# Patient Record
Sex: FEMALE | Race: WHITE | NOT HISPANIC OR LATINO | Employment: FULL TIME | URBAN - METROPOLITAN AREA
[De-identification: names, ages, dates, MRNs, and addresses within clinical notes are randomized per-mention and may not be internally consistent; named-entity substitution may affect disease eponyms.]

---

## 2017-07-27 ENCOUNTER — OFFICE VISIT (OUTPATIENT)
Dept: FAMILY MEDICINE | Facility: OTHER | Age: 47
End: 2017-07-27
Payer: COMMERCIAL

## 2017-07-27 VITALS
SYSTOLIC BLOOD PRESSURE: 108 MMHG | HEIGHT: 69 IN | RESPIRATION RATE: 12 BRPM | DIASTOLIC BLOOD PRESSURE: 64 MMHG | TEMPERATURE: 98.8 F | BODY MASS INDEX: 35.7 KG/M2 | HEART RATE: 74 BPM | WEIGHT: 241 LBS

## 2017-07-27 DIAGNOSIS — L72.0 MILIAL CYST: ICD-10-CM

## 2017-07-27 DIAGNOSIS — D22.9 SUSPICIOUS NEVUS: Primary | ICD-10-CM

## 2017-07-27 PROCEDURE — 88305 TISSUE EXAM BY PATHOLOGIST: CPT | Performed by: PHYSICIAN ASSISTANT

## 2017-07-27 PROCEDURE — 11100 HC BIOPSY SKIN/SUBQ/MUC MEM, SINGLE LESION: CPT | Performed by: PHYSICIAN ASSISTANT

## 2017-07-27 PROCEDURE — 99213 OFFICE O/P EST LOW 20 MIN: CPT | Mod: 25 | Performed by: PHYSICIAN ASSISTANT

## 2017-07-27 ASSESSMENT — ANXIETY QUESTIONNAIRES
IF YOU CHECKED OFF ANY PROBLEMS ON THIS QUESTIONNAIRE, HOW DIFFICULT HAVE THESE PROBLEMS MADE IT FOR YOU TO DO YOUR WORK, TAKE CARE OF THINGS AT HOME, OR GET ALONG WITH OTHER PEOPLE: NOT DIFFICULT AT ALL
2. NOT BEING ABLE TO STOP OR CONTROL WORRYING: NOT AT ALL
6. BECOMING EASILY ANNOYED OR IRRITABLE: NOT AT ALL
7. FEELING AFRAID AS IF SOMETHING AWFUL MIGHT HAPPEN: NOT AT ALL
1. FEELING NERVOUS, ANXIOUS, OR ON EDGE: NOT AT ALL
5. BEING SO RESTLESS THAT IT IS HARD TO SIT STILL: NOT AT ALL
GAD7 TOTAL SCORE: 0
3. WORRYING TOO MUCH ABOUT DIFFERENT THINGS: NOT AT ALL

## 2017-07-27 ASSESSMENT — PATIENT HEALTH QUESTIONNAIRE - PHQ9: 5. POOR APPETITE OR OVEREATING: NOT AT ALL

## 2017-07-27 ASSESSMENT — PAIN SCALES - GENERAL: PAINLEVEL: NO PAIN (0)

## 2017-07-27 NOTE — LETTER
My Depression Action Plan  Name: Triny Bower   Date of Birth 1970  Date: 7/27/2017    My doctor: Otis Samaniego   My clinic: [address] [address] [phone]          GREEN    ZONE   Good Control    What it looks like:     Things are going generally well. You have normal up s and down s. You may even feel depressed from time to time, but bad moods usually last less than a day.   What you need to do:  1. Continue to care for yourself (see self care plan)  2. Check your depression survival kit and update it as needed  3. Follow your physician s recommendations including any medication.  4. Do not stop taking medication unless you consult with your physician first.           YELLOW         ZONE Getting Worse    What it looks like:     Depression is starting to interfere with your life.     It may be hard to get out of bed; you may be starting to isolate yourself from others.    Symptoms of depression are starting to last most all day and this has happened for several days.     You may have suicidal thoughts but they are not constant.   What you need to do:     1. Call your care team, your response to treatment will improve if you keep your care team informed of your progress. Yellow periods are signs an adjustment may need to be made.     2. Continue your self-care, even if you have to fake it!    3. Talk to someone in your support network    4. Open up your depression survival kit           RED    ZONE Medical Alert - Get Help    What it looks like:     Depression is seriously interfering with your life.     You may experience these or other symptoms: You can t get out of bed most days, can t work or engage in other necessary activities, you have trouble taking care of basic hygiene, or basic responsibilities, thoughts of suicide or death that will not go away, self-injurious behavior.     What you need to do:  1. Call your care team and  request a same-day appointment. If they are not available (weekends or after hours) call your local crisis line, emergency room or 911.      Electronically signed by: Franchesca Schaefer, July 27, 2017    Depression Self Care Plan / Survival Kit    Self-Care for Depression  Here s the deal. Your body and mind are really not as separate as most people think.  What you do and think affects how you feel and how you feel influences what you do and think. This means if you do things that people who feel good do, it will help you feel better.  Sometimes this is all it takes.  There is also a place for medication and therapy depending on how severe your depression is, so be sure to consult with your medical provider and/ or Behavioral Health Consultant if your symptoms are worsening or not improving.     In order to better manage my stress, I will:    Exercise  Get some form of exercise, every day. This will help reduce pain and release endorphins, the  feel good  chemicals in your brain. This is almost as good as taking antidepressants!  This is not the same as joining a gym and then never going! (they count on that by the way ) It can be as simple as just going for a walk or doing some gardening, anything that will get you moving.      Hygiene   Maintain good hygiene (Get out of bed in the morning, Make your bed, Brush your teeth, Take a shower, and Get dressed like you were going to work, even if you are unemployed).  If your clothes don't fit try to get ones that do.    Diet  I will strive to eat foods that are good for me, drink plenty of water, and avoid excessive sugar, caffeine, alcohol, and other mood-altering substances.  Some foods that are helpful in depression are: complex carbohydrates, B vitamins, flaxseed, fish or fish oil, fresh fruits and vegetables.    Psychotherapy  I agree to participate in Individual Therapy (if recommended).    Medication  If prescribed medications, I agree to take them.  Missing doses  can result in serious side effects.  I understand that drinking alcohol, or other illicit drug use, may cause potential side effects.  I will not stop my medication abruptly without first discussing it with my provider.    Staying Connected With Others  I will stay in touch with my friends, family members, and my primary care provider/team.    Use your imagination  Be creative.  We all have a creative side; it doesn t matter if it s oil painting, sand castles, or mud pies! This will also kick up the endorphins.    Witness Beauty  (AKA stop and smell the roses) Take a look outside, even in mid-winter. Notice colors, textures. Watch the squirrels and birds.     Service to others  Be of service to others.  There is always someone else in need.  By helping others we can  get out of ourselves  and remember the really important things.  This also provides opportunities for practicing all the other parts of the program.    Humor  Laugh and be silly!  Adjust your TV habits for less news and crime-drama and more comedy.    Control your stress  Try breathing deep, massage therapy, biofeedback, and meditation. Find time to relax each day.     My support system    Clinic Contact:  Phone number:    Contact 1:  Phone number:    Contact 2:  Phone number:    Yazdan[employer]/:  Phone number:    Therapist:  Phone number:    Local crisis center:    Phone number:    Other community support:  Phone number:

## 2017-07-27 NOTE — NURSING NOTE
"Chief Complaint   Patient presents with     Derm Problem       Initial /64  Pulse 74  Temp 98.8  F (37.1  C) (Temporal)  Resp 12  Ht 5' 8.75\" (1.746 m)  Wt 241 lb (109.3 kg)  BMI 35.85 kg/m2 Estimated body mass index is 35.85 kg/(m^2) as calculated from the following:    Height as of this encounter: 5' 8.75\" (1.746 m).    Weight as of this encounter: 241 lb (109.3 kg).  Medication Reconciliation: complete     Franchesca Schaefer CMA (AAMA)      "

## 2017-07-27 NOTE — PROGRESS NOTES
SUBJECTIVE:                                                    Triny Bower is a 47 year old female who presents to clinic today for the following health issues:      Rash  Onset: about a month for one on face, over a year for one on leg    Description:   Location: right cheek and left side of forehead, left lower leg  Character: raised, white on face, red on leg  Itching (Pruritis): no     Progression of Symptoms:  same and constant    Accompanying Signs & Symptoms:  Fever: no   Body aches or joint pain: no   Sore throat symptoms: no   Recent cold symptoms: no     History:   none    Precipitating factors:   none    Alleviating factors:  none    Therapies Tried and outcome: tried squeezing it, lotion, washing face    Patient is here with concern about a few skin lesions. She has 2 small white bumps on the face that have not changed in size and shape. She has not had any drainage from the lesions, has not had any other skin changes to the face.     In addition she has a mole on the leg that has been present for several years that she states has stayed about the same size but has gotten some spots in the center and often gets cut when she shaves, she would like this lesion removed for concern that it may be cancerous.   -------------------------------------    Problem list and histories reviewed & adjusted, as indicated.  Additional history: as documented    BP Readings from Last 3 Encounters:   07/27/17 108/64   11/06/16 (!) 135/93   05/25/16 114/60    Wt Readings from Last 3 Encounters:   07/27/17 241 lb (109.3 kg)   11/06/16 220 lb (99.8 kg)   05/25/16 238 lb (108 kg)         Reviewed and updated as needed this visit by clinical staffTobacco  Allergies  Meds  Problems  Med Hx  Surg Hx  Fam Hx  Soc Hx        Reviewed and updated as needed this visit by Provider  Tobacco  Allergies  Meds  Problems  Med Hx  Surg Hx  Fam Hx  Soc Hx          ROS:  Constitutional, HEENT, cardiovascular, pulmonary, gi  "and gu systems are negative, except as otherwise noted.      OBJECTIVE:   /64  Pulse 74  Temp 98.8  F (37.1  C) (Temporal)  Resp 12  Ht 5' 8.75\" (1.746 m)  Wt 241 lb (109.3 kg)  BMI 35.85 kg/m2  Body mass index is 35.85 kg/(m^2).  GENERAL: healthy, alert and no distress  RESP: lungs clear to auscultation - no rales, rhonchi or wheezes  CV: regular rates and rhythm, peripheral pulses strong and no peripheral edema  MS: no gross musculoskeletal defects noted, no edema  SKIN: there are 2 small while lesions on the face that are consistent with milia. She has a mole on the left shin that is raised and pink with some brown/black spots in the middle.     Diagnostic Test Results:  none     ASSESSMENT/PLAN:       ICD-10-CM    1. Suspicious nevus D22.9 BIOPSY SKIN/SUBQ/MUC MEM, SINGLE LESION     Surgical pathology exam   2. 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 I discussed options of biopsy to further evaluate mole. Patient gave written consent for punch bx.     After informed written consent was obtained, using Chloraprep for cleansing and 1% Lidocaine with epinephrine for anesthetic, with sterile technique a 2.5 mm punch biopsy was used to obtain a biopsy specimen of the lesion. Hemostasis was obtained by pressure and wound was not sutured. Antibiotic dressing is applied, and wound care instructions provided. Be alert for any signs of cutaneous infection. The specimen is labeled and sent to pathology for evaluation. The procedure was well tolerated without complications.    See Patient Instructions    Kamilah Licona PA-C  [facility]  "

## 2017-07-27 NOTE — PATIENT INSTRUCTIONS
Lesions on the face appear to be benign congestion of sweat glands, monitor and if any changes or other concerns follow up for further evaluation.     I will have you monitor biopsy spot for any evidence of infection and follow up as needed. We will contact you with pathology result of biopsy.

## 2017-07-28 ASSESSMENT — ANXIETY QUESTIONNAIRES: GAD7 TOTAL SCORE: 0

## 2017-07-28 ASSESSMENT — PATIENT HEALTH QUESTIONNAIRE - PHQ9: SUM OF ALL RESPONSES TO PHQ QUESTIONS 1-9: 3

## 2017-08-01 LAB — COPATH REPORT: NORMAL

## 2017-10-22 ENCOUNTER — HEALTH MAINTENANCE LETTER (OUTPATIENT)
Age: 47
End: 2017-10-22

## 2017-11-22 ENCOUNTER — TELEPHONE (OUTPATIENT)
Dept: FAMILY MEDICINE | Facility: OTHER | Age: 47
End: 2017-11-22

## 2017-11-22 NOTE — TELEPHONE ENCOUNTER
Called and spoke to the patient. She said she has been having ankle pain for the last 10 days. She said she does not recall any injuries to the ankle. She said it almost feels like it needs to pop but it doesn't. She said her job requires a lot of walking and her ankle gets very aggravated at work. Patient said she has been trying to rest it more. She said on that same foot her big toe nail looks slightly bruised. Patient said yesterday her foot was pretty good. She said today now it is hurting a lot more. Patient said she filed an incident report at work because it really started while she is at work. Patient said she is able to walk on it ok. She said it just really bothers her.     Patient denies all other symptoms (denies chest pain, coughing up blood, SOB, cold/blue foot/ankle, swelling, severe pain, inability to walk, fever, hot to the touch, or red) She was really hoping to get in today to get it checked out. RN advised there are no providers in clinic that can add her on today. Patient does not want to be seen in the ED. She said she will call on Friday to see if anyone cancelled off. She is otherwise scheduled for Monday. She will call back with any further questions or concerns.     RECOMMENDED DISPOSITION:  See in 24 hours - does not want to be seen in the ED. Clinic closed tomorrow for a Holiday. Wants to try calling on Friday to see if anything opens up. Otherwise she asked for an appointment on Monday.   Will comply with recommendation: see above   If further questions/concerns or if Sx do not improve, worsen or new Sx develop, call your PCP or [facility] as soon as possible.    NOTES:  Disposition was determined by the first positive assessment question, therefore all previous assessment questions were negative.  Informed to check provider manual or call insurance company to assure coverage.    Guideline used: Ankle Problems  Telephone Triage Protocols for Nurses, Fifth Edition, Catrina  Jose Goff RN

## 2017-11-22 NOTE — TELEPHONE ENCOUNTER
Reason for call:  Patient reporting a symptom    Symptom or request: right ankle pain x 10 days - work comp    Duration (how long have symptoms been present):    Have you been treated for this before? No    Additional comments: would like to be seen today - no openings  - would like to speak to a nurse - [facility] patient    Phone Number patient can be reached at:  Home number on file [phone] (home)    Best Time:  any    Can we leave a detailed message on this number:  YES    Call taken on 11/22/2017 at 3:29 PM by Randy Dietrich

## 2017-11-24 ENCOUNTER — RADIANT APPOINTMENT (OUTPATIENT)
Dept: GENERAL RADIOLOGY | Facility: OTHER | Age: 47
End: 2017-11-24
Attending: FAMILY MEDICINE
Payer: OTHER MISCELLANEOUS

## 2017-11-24 ENCOUNTER — OFFICE VISIT (OUTPATIENT)
Dept: FAMILY MEDICINE | Facility: OTHER | Age: 47
End: 2017-11-24
Payer: OTHER MISCELLANEOUS

## 2017-11-24 VITALS
SYSTOLIC BLOOD PRESSURE: 100 MMHG | RESPIRATION RATE: 20 BRPM | HEART RATE: 72 BPM | TEMPERATURE: 98.1 F | WEIGHT: 229.3 LBS | DIASTOLIC BLOOD PRESSURE: 66 MMHG | BODY MASS INDEX: 34.11 KG/M2

## 2017-11-24 DIAGNOSIS — S99.911A INJURY OF RIGHT ANKLE, INITIAL ENCOUNTER: Primary | ICD-10-CM

## 2017-11-24 DIAGNOSIS — M79.671 RIGHT FOOT PAIN: ICD-10-CM

## 2017-11-24 DIAGNOSIS — S99.911A INJURY OF RIGHT ANKLE, INITIAL ENCOUNTER: ICD-10-CM

## 2017-11-24 PROCEDURE — 73610 X-RAY EXAM OF ANKLE: CPT | Mod: RT

## 2017-11-24 PROCEDURE — 73630 X-RAY EXAM OF FOOT: CPT | Mod: RT

## 2017-11-24 PROCEDURE — 99213 OFFICE O/P EST LOW 20 MIN: CPT | Performed by: FAMILY MEDICINE

## 2017-11-24 ASSESSMENT — PAIN SCALES - GENERAL: PAINLEVEL: MODERATE PAIN (4)

## 2017-11-24 NOTE — NURSING NOTE
"Chief Complaint   Patient presents with     Ankle/Foot right     pain in rt ankle and foot, d.o.i. 11/13/17/WORK COMP       Initial /66 (BP Location: Right arm, Patient Position: Chair, Cuff Size: Adult Large)  Pulse 72  Temp 98.1  F (36.7  C) (Temporal)  Resp 20  Wt 229 lb 4.8 oz (104 kg)  BMI 34.11 kg/m2 Estimated body mass index is 34.11 kg/(m^2) as calculated from the following:    Height as of 7/27/17: 5' 8.75\" (1.746 m).    Weight as of this encounter: 229 lb 4.8 oz (104 kg).  Medication Reconciliation: complete  "

## 2017-11-24 NOTE — MR AVS SNAPSHOT
"              After Visit Summary   11/24/2017    Triny Bower    MRN: [id number]           Patient Information     Date Of Birth          1970        Visit Information        Provider Department      11/24/2017 10:00 AM Soto Cuevas MD [facility]        Today's Diagnoses     Injury of right ankle, initial encounter    -  1    Right foot pain           Follow-ups after your visit        Your next 10 appointments already scheduled     Nov 27, 2017  4:50 PM CST   Office Visit with BINU Muniz CNP   [facility]    [address] [place] [facility] [address] [phone]           Bring a current list of meds and any records pertaining to this visit. For Physicals, please bring immunization records and any forms needing to be filled out. Please arrive 10 minutes early to complete paperwork.              Who to contact     If you have questions or need follow up information about today's clinic visit or your schedule please contact [facility] directly at [phone].  Normal or non-critical lab and imaging results will be communicated to you by MyChart, letter or phone within 4 business days after the clinic has received the results. If you do not hear from us within 7 days, please contact the clinic through [employer] or phone. If you have a critical or abnormal lab result, we will notify you by phone as soon as possible.  Submit refill requests through [employer] or call your pharmacy and they will forward the refill request to us. Please allow 3 business days for your refill to be completed.          Additional Information About Your Visit        MyChart Information     [employer] lets you send messages to your doctor, view your test results, renew your prescriptions, schedule appointments and more. To sign up, go to [email or web address] . Click on \"Log in\" on the left side of the screen, which will take you to the " "Welcome page. Then click on \"Sign up Now\" on the right side of the page.     You will be asked to enter the access code listed below, as well as some personal information. Please follow the directions to create your username and password.     Your access code is: [email or web address]  Expires: 2018 12:45 PM     Your access code will  in 90 days. If you need help or a new code, please call your [place] clinic or [phone].        Care EveryWhere ID     This is your Care EveryWhere ID. This could be used by other organizations to access your [place] medical records  [id number]        Your Vitals Were     Pulse Temperature Respirations BMI (Body Mass Index)          72 98.1  F (36.7  C) (Temporal) 20 34.11 kg/m2         Blood Pressure from Last 3 Encounters:   17 100/66   17 108/64   16 (!) 135/93    Weight from Last 3 Encounters:   17 229 lb 4.8 oz (104 kg)   17 241 lb (109.3 kg)   16 220 lb (99.8 kg)               Primary Care Provider Office Phone # Fax #    Otis Samaniego PA-C [phone]       [address] [facility] [address]        Equal Access to Services     TAMMY LUGO : Hadii carlos ku hadasho Sobrodyali, waaxda luqadaha, qaybta kaalmada josephine, stefany pozo. So [facility] [phone].    ATENCIÓN: Si habla español, tiene a rashid disposición servicios gratuitos de asistencia lingüística. Llame al [phone].    We comply with applicable federal civil rights laws and [place] laws. We do not discriminate on the basis of race, color, national origin, age, disability, sex, sexual orientation, or gender identity.            Thank you!     Thank you for choosing [facility]  for your care. Our goal is always to provide you with excellent care. Hearing back from our patients is one way we can continue to improve our services. Please take a few minutes to complete the written survey that you may receive in the mail after your " visit with us. Thank you!             Your Updated Medication List - Protect others around you: Learn how to safely use, store and throw away your medicines at www.disposemymeds.org.      Notice  As of 11/24/2017 12:45 PM    You have not been prescribed any medications.

## 2017-11-24 NOTE — PROGRESS NOTES
SUBJECTIVE:   Triny Bower is a 47 year old female who presents to clinic today for the following health issues:  Chief Complaint   Patient presents with     Ankle/Foot right     pain in rt ankle and foot, d.o.i. 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       REVIEW OF SYSTEMS: no other symptoms verbilized        EXAM:  /66 (BP Location: Right arm, Patient Position: Chair, Cuff Size: Adult Large)  Pulse 72  Temp 98.1  F (36.7  C) (Temporal)  Resp 20  Wt 229 lb 4.8 oz (104 kg)  BMI 34.11 kg/m2  EXTREMITIES: No cyanosis or edema. Peripheral pulses nl. Slight tenderness to palp medial and lat on lower ankle.  No swelling or redness  Dark great toenail (black nail sign from overuse/walking)    IMPRESSION/PLAN:  Encounter Diagnoses   Name Primary?     Injury of right ankle, initial encounter: overuse syndrome      Right foot pain      PLAN:     XR Foot Right G/E 3 Views, nl     XR Ankle Right G/E 3 Views,nl    recommended ice, heat, nsaids rest   light duty at work x 10 dd   cb if continuing symptoms. May need ortho consult  Padding for great toe    Soto Cuevas

## 2017-11-28 ENCOUNTER — TELEPHONE (OUTPATIENT)
Dept: FAMILY MEDICINE | Facility: CLINIC | Age: 47
End: 2017-11-28

## 2017-11-28 NOTE — TELEPHONE ENCOUNTER
Reason for Call:  Form, our goal is to have forms completed with 72 hours, however, some forms may require a visit or additional information.    Type of letter, form or note:  worker's compensation    Who is the form from?: [employer] (if other please explain)    Where did the form come from: Patient or family brought in       What clinic location was the form placed at?: [place]    Where the form was placed: [facility]    What number is listed as a contact on the form?: Lory at [phone] [facility] [address]       Additional comments: please complete and fax to [phone]    Call taken on 11/28/2017 at 12:59 PM by Azeb Baeza

## 2018-03-19 NOTE — PROGRESS NOTES
SUBJECTIVE:   Triny Bower is a 47 year old female who presents to clinic today for the following health issues:    History of Present Illness     Diet:  Regular (no restrictions)  Frequency of exercise:  None  Taking medications regularly:  Not Applicable  Medication side effects:  Not applicable  Additional concerns today:  No    Joint Pain    Onset: 3 weeks    Description:   Location: left wrist  Character: Sharp and Dull ache    Intensity: mild, severe    Progression of Symptoms: same    Accompanying Signs & Symptoms:  Other symptoms: radiation of pain to elbow and muriel fingers and weakness of Hand when days are bad    History:   Previous similar pain: no       Precipitating factors:   Trauma or overuse: no     Alleviating factors:  Improved by: rest/inactivity    Therapies Tried and outcome: none      Problem list and histories reviewed & adjusted, as indicated.  Additional history: as documented    Patient Active Problem List   Diagnosis     Lumbar radiculopathy     Herniated nucleus pulposus, L5-S1, right     Displacement of lumbar intervertebral disc without myelopathy     Major depressive disorder, recurrent episode, mild (H)     Gastroesophageal reflux disease without esophagitis     Constipation, unspecified constipation type     Hyperlipidemia LDL goal <130     Past Surgical History:   Procedure Laterality Date     BACK SURGERY       HEMILAMINECTOMY, DISCECTOMY LUMBAR TWO LEVELS, COMBINED  8/22/2012    Procedure: COMBINED HEMILAMINECTOMY, DISCECTOMY LUMBAR TWO LEVELS;  Discectomy right Lumbar 4-Sacral 1, Hemilaminectomy bilateral Lumbar 4-Sacral 1;  Surgeon: Shree Martinez MD;  Location: [facility] OR     LAMINECT/DISCECTOMY, LUMBAR  01/20/10    L4-5     TUBAL LIGATION  1995       Social History   Substance Use Topics     Smoking status: Never Smoker     Smokeless tobacco: Never Used     Alcohol use Yes      Comment: Very Rare     Family History   Problem Relation Age of Onset     CANCER  "Maternal Grandmother      cervical cancer     Gynecology Maternal Grandmother      cervical cancer     Arthritis Paternal Grandmother      OSTEOPOROSIS Paternal Grandmother          No current outpatient prescriptions on file.     Allergies   Allergen Reactions     No Known Drug Allergy      Recent Labs   Lab Test  06/29/15   1649  08/08/12   1134   LDL   --   115   ALT  16   --    CR  0.94  0.98   GFRESTIMATED  64  63   GFRESTBLACK  78  76   POTASSIUM  3.6  4.0   TSH  3.68   --       BP Readings from Last 3 Encounters:   03/22/18 100/68   11/24/17 100/66   07/27/17 108/64    Wt Readings from Last 3 Encounters:   03/22/18 214 lb (97.1 kg)   11/24/17 229 lb 4.8 oz (104 kg)   07/27/17 241 lb (109.3 kg)                  Labs reviewed in EPIC    ROS:  Constitutional, HEENT, cardiovascular, pulmonary, gi and gu systems are negative, except as otherwise noted.    OBJECTIVE:     /68 (Cuff Size: Adult Large)  Pulse 76  Temp 97.7  F (36.5  C) (Temporal)  Resp 16  Ht 5' 8.75\" (1.746 m)  Wt 214 lb (97.1 kg)  BMI 31.83 kg/m2  Body mass index is 31.83 kg/(m^2).  GENERAL: healthy, alert and no distress  RESP: lungs clear to auscultation - no rales, rhonchi or wheezes  CV: regular rate and rhythm, normal S1 S2, no S3 or S4, no murmur, click or rub, no peripheral edema and peripheral pulses strong  MS: pain over the distal forearm / proximal wrist on the left.  SKIN: no suspicious lesions or rashes  NEURO: Normal strength and tone, mentation intact and speech normal  PSYCH: mentation appears normal, affect normal/bright    Diagnostic Test Results:  No results found for this or any previous visit (from the past 24 hour(s)).    ASSESSMENT/PLAN:     1. Screening for malignant neoplasm of cervix  Advised full PE soon.    2. Major depressive disorder, recurrent episode, mild (H)  Doing well, no new concerns    3. Hyperlipidemia LDL goal <130  Due for labs  - Lipid panel reflex to direct LDL Fasting; Future    4. Left wrist " pain  Most likely arthritic in nature.  - XR Wrist Left G/E 3 Views; Future  Consider orthopedic consultation.    Otis Hernández PA-C  [facility] for HPI/ROS submitted by the patient on 3/22/2018   PHQ-2 Score: 0  If you checked off any problems, how difficult have these problems made it for you to do your work, take care of things at home, or get along with other people?: Not difficult at all  PHQ9 TOTAL SCORE: 0

## 2018-03-22 ENCOUNTER — RADIANT APPOINTMENT (OUTPATIENT)
Dept: GENERAL RADIOLOGY | Facility: OTHER | Age: 48
End: 2018-03-22
Attending: PHYSICIAN ASSISTANT
Payer: COMMERCIAL

## 2018-03-22 ENCOUNTER — OFFICE VISIT (OUTPATIENT)
Dept: FAMILY MEDICINE | Facility: OTHER | Age: 48
End: 2018-03-22
Payer: COMMERCIAL

## 2018-03-22 VITALS
WEIGHT: 214 LBS | RESPIRATION RATE: 16 BRPM | DIASTOLIC BLOOD PRESSURE: 68 MMHG | TEMPERATURE: 97.7 F | BODY MASS INDEX: 31.7 KG/M2 | SYSTOLIC BLOOD PRESSURE: 100 MMHG | HEART RATE: 76 BPM | HEIGHT: 69 IN

## 2018-03-22 DIAGNOSIS — M25.532 LEFT WRIST PAIN: ICD-10-CM

## 2018-03-22 DIAGNOSIS — Z12.4 SCREENING FOR MALIGNANT NEOPLASM OF CERVIX: Primary | ICD-10-CM

## 2018-03-22 DIAGNOSIS — E78.5 HYPERLIPIDEMIA LDL GOAL <130: ICD-10-CM

## 2018-03-22 DIAGNOSIS — F33.0 MAJOR DEPRESSIVE DISORDER, RECURRENT EPISODE, MILD (H): ICD-10-CM

## 2018-03-22 PROCEDURE — 99214 OFFICE O/P EST MOD 30 MIN: CPT | Performed by: PHYSICIAN ASSISTANT

## 2018-03-22 PROCEDURE — 73110 X-RAY EXAM OF WRIST: CPT | Mod: LT

## 2018-03-22 ASSESSMENT — PATIENT HEALTH QUESTIONNAIRE - PHQ9
SUM OF ALL RESPONSES TO PHQ QUESTIONS 1-9: 0
10. IF YOU CHECKED OFF ANY PROBLEMS, HOW DIFFICULT HAVE THESE PROBLEMS MADE IT FOR YOU TO DO YOUR WORK, TAKE CARE OF THINGS AT HOME, OR GET ALONG WITH OTHER PEOPLE: NOT DIFFICULT AT ALL
SUM OF ALL RESPONSES TO PHQ QUESTIONS 1-9: 0

## 2018-03-22 ASSESSMENT — PAIN SCALES - GENERAL: PAINLEVEL: MODERATE PAIN (4)

## 2018-03-22 NOTE — NURSING NOTE
"Chief Complaint   Patient presents with     Musculoskeletal Problem     Panel Management     Mychart, PAP, Lipids, PHQ-9       Initial /68 (Cuff Size: Adult Large)  Pulse 76  Temp 97.7  F (36.5  C) (Temporal)  Resp 16  Ht 5' 8.75\" (1.746 m)  Wt 214 lb (97.1 kg)  BMI 31.83 kg/m2 Estimated body mass index is 31.83 kg/(m^2) as calculated from the following:    Height as of this encounter: 5' 8.75\" (1.746 m).    Weight as of this encounter: 214 lb (97.1 kg).  Medication Reconciliation: complete  "

## 2018-03-22 NOTE — MR AVS SNAPSHOT
After Visit Summary   3/22/2018    Triny Bower    MRN: [id number]           Patient Information     Date Of Birth          1970        Visit Information        Provider Department      3/22/2018 7:40 AM Otis Samaniego PA-C [place]        Today's Diagnoses     Screening for malignant neoplasm of cervix    -  1    Major depressive disorder, recurrent episode, mild (H)        Hyperlipidemia LDL goal <130        Left wrist pain           Follow-ups after your visit        Additional Services     ORTHOPEDICS ADULT REFERRAL       Your provider [place] you [place] [facility] [facility] [phone]   [email or web address]    Please be aware that coverage of these services is subject to the terms and limitations of your health insurance plan.  Call member services at your health plan with any benefit or coverage questions.      Please bring the following to your appointment:    >>   Any x-rays, CTs or MRIs which have been performed.  Contact the facility where they were done to arrange for  prior to your scheduled appointment.    >>   List of current medications   >>   This referral request   >>   Any documents/labs given to you for this referral                  Future tests that were ordered for you today     Open Future Orders        Priority Expected Expires Ordered    Lipid panel reflex to direct LDL Fasting Routine  4/22/2018 3/22/2018            Who to contact     If you have questions or need follow up information about today's clinic visit or your schedule please contact [facility] directly at [phone].  Normal or non-critical lab and imaging results will be communicated to you by MyChart, letter or phone within 4 business days after the clinic has received the results. If you do not hear from us within 7 days, please contact the clinic through MyChart or phone. If you have a critical or  "abnormal lab result, we will notify you by phone as soon as possible.  Submit refill requests through Appointedd or call your pharmacy and they will forward the refill request to us. Please allow 3 business days for your refill to be completed.          Additional Information About Your Visit        MyChart Information     Appointedd lets you send messages to your doctor, view your test results, renew your prescriptions, schedule appointments and more. To sign up, go to [email or web address] . Click on \"Log in\" on the left side of the screen, which will take you to the Welcome page. Then click on \"Sign up Now\" on the right side of the page.     You will be asked to enter the access code listed below, as well as some personal information. Please follow the directions to create your username and password.     Your access code is: [email or web address]  Expires: 2018  8:34 AM     Your access code will  in 90 days. If you need help or a new code, please call your [facility] clinic or [phone].        Care EveryWhere ID     This is your Care EveryWhere ID. This could be used by other organizations to access your [facility] medical records  [id number]        Your Vitals Were     Pulse Temperature Respirations Height BMI (Body Mass Index)       76 97.7  F (36.5  C) (Temporal) 16 5' 8.75\" (1.746 m) 31.83 kg/m2        Blood Pressure from Last 3 Encounters:   18 100/68   17 100/66   17 108/64    Weight from Last 3 Encounters:   18 214 lb (97.1 kg)   17 229 lb 4.8 oz (104 kg)   17 241 lb (109.3 kg)              We Performed the Following     ORTHOPEDICS ADULT REFERRAL        Primary Care Provider Office Phone # Fax #    Otis Samaniego PA-C [phone]       [facility] [address] [facility] [address]        Equal Access to Services     TAMMY LUGO AH: Jose Kc, abdi posey, stefany rosado kharash la'aan " ah. So [facility] [phone].    ATENCIÓN: Si habla jeremy, tiene a rashid disposición servicios gratuitos de asistencia lingüística. Debo al [phone].    We comply with applicable federal civil rights laws and [place] laws. We do not discriminate on the basis of race, color, national origin, age, disability, sex, sexual orientation, or gender identity.            Thank you!     Thank you for choosing [facility]  for your care. Our goal is always to provide you with excellent care. Hearing back from our patients is one way we can continue to improve our services. Please take a few minutes to complete the written survey that you may receive in the mail after your visit with us. Thank you!             Your Updated Medication List - Protect others around you: Learn how to safely use, store and throw away your medicines at www.disposemymeds.org.      Notice  As of 3/22/2018  8:35 AM    You have not been prescribed any medications.

## 2018-03-23 ASSESSMENT — PATIENT HEALTH QUESTIONNAIRE - PHQ9: SUM OF ALL RESPONSES TO PHQ QUESTIONS 1-9: 0

## 2018-04-04 ENCOUNTER — OFFICE VISIT (OUTPATIENT)
Dept: ORTHOPEDICS | Facility: CLINIC | Age: 48
End: 2018-04-04
Payer: COMMERCIAL

## 2018-04-04 VITALS
SYSTOLIC BLOOD PRESSURE: 109 MMHG | BODY MASS INDEX: 31.7 KG/M2 | WEIGHT: 214 LBS | HEIGHT: 69 IN | DIASTOLIC BLOOD PRESSURE: 72 MMHG | HEART RATE: 62 BPM | TEMPERATURE: 97 F

## 2018-04-04 DIAGNOSIS — M77.8 TENDINITIS OF LEFT WRIST: Primary | ICD-10-CM

## 2018-04-04 PROCEDURE — 99243 OFF/OP CNSLTJ NEW/EST LOW 30: CPT | Performed by: ORTHOPAEDIC SURGERY

## 2018-04-04 ASSESSMENT — PAIN SCALES - GENERAL: PAINLEVEL: NO PAIN (0)

## 2018-04-04 NOTE — LETTER
4/4/2018         RE: Triny oBwer  [address] [facility] [address]        Dear Colleague,    Thank you for referring your patient, Triny Bower, to the [facility]. Please see a copy of my visit note below.    ORTHOPEDIC CONSULT      Chief Complaint: Triny Bower is a 47 year old left hand dominant female who works at [employer].        She is being seen for   Chief Complaints and History of Present Illnesses   Patient presents with     Pain     left wrist pain     Consult     referring VIJAY Hernández         History of Present Illness:   Triny Bower is a 47 year old female who is seen in consultation at the request of Otis Bunn.  History of Present illness:  Triny presents for evaluation of:  1.) left wrist   Onset: mid and February  Symptoms brought on by nothing.   Character: achey pain     Progression of symptoms:  same.    Previous similar pain: no .   Pain Level:  0/10.   Previous treatments:  heat and Ibuprofen. Wrist brace - no help  Currently on Blood thinners? no  Diagnosis of Diabetes? no  Starts at ulnar styloid and travels up ulnar forearm, can get sore all over wrist when it acts up, no numbness/tingling  Difficulty making a fist, picking things up      Patient's past medical, surgical, social and family histories reviewed.       Past Medical History:   Diagnosis Date     Displacement of lumbar intervertebral disc without myelopathy 01/20/10    Transfer to [facility]     Hyperlipidemia LDL goal <130 3/22/2018         Past Surgical History:   Procedure Laterality Date     BACK SURGERY       HEMILAMINECTOMY, DISCECTOMY LUMBAR TWO LEVELS, COMBINED  8/22/2012    Procedure: COMBINED HEMILAMINECTOMY, DISCECTOMY LUMBAR TWO LEVELS;  Discectomy right Lumbar 4-Sacral 1, Hemilaminectomy bilateral Lumbar 4-Sacral 1;  Surgeon: Shree Martinez MD;  Location: [facility]     LAMINECT/DISCECTOMY, LUMBAR  01/20/10    L4-5     TUBAL LIGATION  1995         Medications:    No current  "outpatient prescriptions on file prior to visit.  No current facility-administered medications on file prior to visit.       Allergies   Allergen Reactions     No Known Drug Allergy          Social History     Occupational History     Not on file.     Social History Main Topics     Smoking status: Never Smoker     Smokeless tobacco: Never Used     Alcohol use Yes      Comment: Very Rare     Drug use: No     Sexual activity: Yes     Partners: Male       Patient does not use Tobacco products.      Family History   Problem Relation Age of Onset     CANCER Maternal Grandmother      cervical cancer     Gynecology Maternal Grandmother      cervical cancer     Arthritis Paternal Grandmother      OSTEOPOROSIS Paternal Grandmother          REVIEW OF SYSTEMS  10 point review systems performed otherwise negative as noted as per history of present illness.      Physical Exam:  Vitals: /72 (Cuff Size: Adult Large)  Pulse 62  Temp 97  F (36.1  C)  Ht 1.746 m (5' 8.75\")  Wt 97.1 kg (214 lb)  BMI 31.83 kg/m2  BMI= Body mass index is 31.83 kg/(m^2).    Constitutional: healthy, alert and no acute distress   Psychiatric: mentation appears normal and affect normal/bright  NEURO: no focal deficits  RESP: Normal with easy respirations and no use of accessory muscles to breathe, no audible wheezing or retractions  CV: regular pulse  SKIN: No erythema, rashes, excoriation, or breakdown. No evidence of infection.   JOINT/EXTREMITIES:left Wrist Exam: WRIST:  Inspection: no swelling  no effusion  Palpation: Tender: distal ulna  Non-tender: distal radius  Range of Motion: normal  Strength: no deficits    GAIT: non-antalgic  Lymph: no palpable lymph nodes    Diagnostic Modalities:  left wrist X-ray: mild carpal DJD - asx  Independent visualization of the images was performed.      Impression: left wrist ulnar sided tendonitis    Plan:  All of the above pertinent physical exam and imaging modalities findings was reviewed with " Triny.                                          CONSERVATIVE CARE:  I recommend conservative care for the patient to include NSAIDs, Tylenol, activity modifications, OT, topical tx. Today I provided or dispensed occupational therapy, info and hep.                                                FUTURE PLAN:  On their return if they still have symptoms we will consider NSAID's, injection of steroids.    BP Readings from Last 1 Encounters:   04/04/18 109/72       BP noted to be well controlled today in office.     Return to clinic 6, week(s), or sooner as needed for changes.  Re-x-ray on return: No    Kristy Jean M.D.        Again, thank you for allowing me to participate in the care of your patient.        Sincerely,        Kristy Jean MD

## 2018-04-04 NOTE — MR AVS SNAPSHOT
After Visit Summary   4/4/2018    Triny Bower    MRN: [id number]           Patient Information     Date Of Birth          1970        Visit Information        Provider Department      4/4/2018 4:10 PM Kristy Jean MD [facility]        Care Instructions      Tendonitis  A tendon is the thick fibrous cord that joins muscle to bone and allows joints to move. When a tendon becomes inflamed, it is called tendonitis. This can occur from overuse, injury, or infection. This usually involves the shoulders, forearm, wrist, hands and foot. Symptoms include pain, swelling and tenderness to the touch. Moving the joint increases the pain.  It takes 4 to 6 weeks for tendonitis to heal. It is treated by preventing motion of the tendon with a splint or brace and the use of anti-inflammatory medicine.  Home care    Some people find relief with ice packs. These can be crushed or cubed ice in a plastic bag or a bag of frozen vegetables wrapped in a thin towel. Other people get better relief with heat. This can include a hot shower, hot bath, or a moist towel warmed in a microwave. Try each and use the method that feels best, for 15 to 20 minutes several times a day.    Rest the inflamed joint and protect it from movement.    You may use over-the-counter ibuprofen or naproxen to treat pain and inflammation, unless another medicine was prescribed. If you can't take these medicines, acetaminophen may help with the pain, but does not treat inflammation. If you have chronic liver or kidney disease or ever had a stomach ulcer or gastrointestinal bleeding, talk with your doctor before using these medicines.    As your symptoms improve, begin gradual motion at the involved joint.  Follow-up care  Follow up with your healthcare provider if you are not improving after 5 days of treatment.  When to seek medical advice  Call your healthcare provider right away if any of these occur:    Redness over  the painful area    Increasing pain or swelling at the joint    Fever (1 degree above your normal temperature) lasting 24 to 48 hours Or, whatever your healthcare provider told you to report based on your condition  Date Last Reviewed: 11/21/2015 2000-2017 [employer]. [address] [place] [address]. All rights reserved. This information is not intended as a substitute for professional medical care. Always follow your healthcare professional's instructions.        Hand and Wrist Exercises: Wrist Flexion    This exercise is designed to stretch your hands and wrists. Before beginning, read through all the instructions. While exercising, breathe normally. If you feel any pain, stop the exercise. If pain persists, inform your healthcare provider.    Hold your _____ hand in front of you with your palm down and elbow bent.    Grasp the back of that hand with your other hand. Pull back so your fingers point down as you straighten your arm. Feel a stretch in your forearm and wrist. Hold for _____ seconds. Then relax.    Repeat _____ times. Do _____ sets a day.  Date Last Reviewed: 8/16/2015 2000-2017 [employer]. [address] [place] [address]. All rights reserved. This information is not intended as a substitute for professional medical care. Always follow your healthcare professional's instructions.        Wrist Extension (Flexibility)    1. Stand or sit and hold your arms straight out in front of you.  2. Bend your right hand back at the wrist. Gently pull back on your right hand with your left hand. Don t bend your fingertips backward. Feel the stretch in your right wrist.  3. Hold for 30 to 60 seconds, then relax. Repeat 2 times.  4. Switch arms and repeat.  Date Last Reviewed: 3/10/2016    9060-4614 [employer]. [address] [place] [address]. All rights reserved. This information is not intended as a substitute for professional medical  "care. Always follow your healthcare professional's instructions.                Follow-ups after your visit        Who to contact     If you have questions or need follow up information about today's clinic visit or your schedule please contact [facility] directly at [phone].  Normal or non-critical lab and imaging results will be communicated to you by MyChart, letter or phone within 4 business days after the clinic has received the results. If you do not hear from us within 7 days, please contact the clinic through Baculahart or phone. If you have a critical or abnormal lab result, we will notify you by phone as soon as possible.  Submit refill requests through [email or web address] or call your pharmacy and they will forward the refill request to us. Please allow 3 business days for your refill to be completed.          Additional Information About Your Visit        MyCharHortonworks Information     [email or web address] lets you send messages to your doctor, view your test results, renew your prescriptions, schedule appointments and more. To sign up, go to [email or web address] . Click on \"Log in\" on the left side of the screen, which will take you to the Welcome page. Then click on \"Sign up Now\" on the right side of the page.     You will be asked to enter the access code listed below, as well as some personal information. Please follow the directions to create your username and password.     Your access code is: [email or web address]  Expires: 2018  8:34 AM     Your access code will  in 90 days. If you need help or a new code, please call your [facility] clinic or [phone].        Care EveryWhere ID     This is your Care EveryWhere ID. This could be used by other organizations to access your [facility] medical records  [id number]        Your Vitals Were     Pulse Temperature Height BMI (Body Mass Index)          62 97  F (36.1  C) 1.746 m (5' 8.75\") 31.83 kg/m2         Blood Pressure from Last 3 Encounters:   18 " 109/72   03/22/18 100/68   11/24/17 100/66    Weight from Last 3 Encounters:   04/04/18 97.1 kg (214 lb)   03/22/18 97.1 kg (214 lb)   11/24/17 104 kg (229 lb 4.8 oz)              Today, you had the following     No orders found for display       Primary Care Provider Office Phone # Fax #    Otis Samaniego PA-C [phone]       [place] [address]        Equal Access to Services     [facility]: Hadii aad ku hadasho Soomaali, waaxda luqadaha, qaybta kaalmada adeegyada, waxjuan antonio eldridge . So [facility] [phone].    ATENCIÓN: Si habla español, tiene a rashid disposición servicios gratuitos de asistencia lingüística. [facility] [phone].    We comply with applicable federal civil rights laws and [place] laws. We do not discriminate on the basis of race, color, national origin, age, disability, sex, sexual orientation, or gender identity.            Thank you!     Thank you for choosing [facility]  for your care. Our goal is always to provide you with excellent care. Hearing back from our patients is one way we can continue to improve our services. Please take a few minutes to complete the written survey that you may receive in the mail after your visit with us. Thank you!             Your Updated Medication List - Protect others around you: Learn how to safely use, store and throw away your medicines at www.disposemymeds.org.      Notice  As of 4/4/2018  4:52 PM    You have not been prescribed any medications.

## 2018-04-04 NOTE — PROGRESS NOTES
ORTHOPEDIC CONSULT      Chief Complaint: Triny Bower is a 47 year old left hand dominant female who works at [employer].        She is being seen for   Chief Complaints and History of Present Illnesses   Patient presents with     Pain     left wrist pain     Consult     referring VIJAY Hernández         History of Present Illness:   Triny Bower is a 47 year old female who is seen in consultation at the request of Otis Bunn.  History of Present illness:  Triny presents for evaluation of:  1.) left wrist   Onset: mid and February  Symptoms brought on by nothing.   Character: achey pain     Progression of symptoms:  same.    Previous similar pain: no .   Pain Level:  0/10.   Previous treatments:  heat and Ibuprofen. Wrist brace - no help  Currently on Blood thinners? no  Diagnosis of Diabetes? no  Starts at ulnar styloid and travels up ulnar forearm, can get sore all over wrist when it acts up, no numbness/tingling  Difficulty making a fist, picking things up      Patient's past medical, surgical, social and family histories reviewed.       Past Medical History:   Diagnosis Date     Displacement of lumbar intervertebral disc without myelopathy 01/20/10    Transfer to [facility]     Hyperlipidemia LDL goal <130 3/22/2018         Past Surgical History:   Procedure Laterality Date     BACK SURGERY       HEMILAMINECTOMY, DISCECTOMY LUMBAR TWO LEVELS, COMBINED  8/22/2012    Procedure: COMBINED HEMILAMINECTOMY, DISCECTOMY LUMBAR TWO LEVELS;  Discectomy right Lumbar 4-Sacral 1, Hemilaminectomy bilateral Lumbar 4-Sacral 1;  Surgeon: Shree Martinez MD;  Location: [facility]     LAMINECT/DISCECTOMY, LUMBAR  01/20/10    L4-5     TUBAL LIGATION  1995         Medications:    No current outpatient prescriptions on file prior to visit.  No current facility-administered medications on file prior to visit.       Allergies   Allergen Reactions     No Known Drug Allergy          Social History     Occupational History     Not on  "file.     Social History Main Topics     Smoking status: Never Smoker     Smokeless tobacco: Never Used     Alcohol use Yes      Comment: Very Rare     Drug use: No     Sexual activity: Yes     Partners: Male       Patient does not use Tobacco products.      Family History   Problem Relation Age of Onset     CANCER Maternal Grandmother      cervical cancer     Gynecology Maternal Grandmother      cervical cancer     Arthritis Paternal Grandmother      OSTEOPOROSIS Paternal Grandmother          REVIEW OF SYSTEMS  10 point review systems performed otherwise negative as noted as per history of present illness.      Physical Exam:  Vitals: /72 (Cuff Size: Adult Large)  Pulse 62  Temp 97  F (36.1  C)  Ht 1.746 m (5' 8.75\")  Wt 97.1 kg (214 lb)  BMI 31.83 kg/m2  BMI= Body mass index is 31.83 kg/(m^2).    Constitutional: healthy, alert and no acute distress   Psychiatric: mentation appears normal and affect normal/bright  NEURO: no focal deficits  RESP: Normal with easy respirations and no use of accessory muscles to breathe, no audible wheezing or retractions  CV: regular pulse  SKIN: No erythema, rashes, excoriation, or breakdown. No evidence of infection.   JOINT/EXTREMITIES:left Wrist Exam: WRIST:  Inspection: no swelling  no effusion  Palpation: Tender: distal ulna  Non-tender: distal radius  Range of Motion: normal  Strength: no deficits    GAIT: non-antalgic  Lymph: no palpable lymph nodes    Diagnostic Modalities:  left wrist X-ray: mild carpal DJD - asx  Independent visualization of the images was performed.      Impression: left wrist ulnar sided tendonitis    Plan:  All of the above pertinent physical exam and imaging modalities findings was reviewed with Triny.                                          CONSERVATIVE CARE:  I recommend conservative care for the patient to include NSAIDs, Tylenol, activity modifications, OT, topical tx. Today I provided or dispensed occupational therapy, info and hep.    "                                             FUTURE PLAN:  On their return if they still have symptoms we will consider NSAID's, injection of steroids.    BP Readings from Last 1 Encounters:   04/04/18 109/72       BP noted to be well controlled today in office.     Return to clinic 6, week(s), or sooner as needed for changes.  Re-x-ray on return: No    Kristy Jean M.D.

## 2018-04-04 NOTE — PATIENT INSTRUCTIONS
Tendonitis  A tendon is the thick fibrous cord that joins muscle to bone and allows joints to move. When a tendon becomes inflamed, it is called tendonitis. This can occur from overuse, injury, or infection. This usually involves the shoulders, forearm, wrist, hands and foot. Symptoms include pain, swelling and tenderness to the touch. Moving the joint increases the pain.  It takes 4 to 6 weeks for tendonitis to heal. It is treated by preventing motion of the tendon with a splint or brace and the use of anti-inflammatory medicine.  Home care    Some people find relief with ice packs. These can be crushed or cubed ice in a plastic bag or a bag of frozen vegetables wrapped in a thin towel. Other people get better relief with heat. This can include a hot shower, hot bath, or a moist towel warmed in a microwave. Try each and use the method that feels best, for 15 to 20 minutes several times a day.    Rest the inflamed joint and protect it from movement.    You may use over-the-counter ibuprofen or naproxen to treat pain and inflammation, unless another medicine was prescribed. If you can't take these medicines, acetaminophen may help with the pain, but does not treat inflammation. If you have chronic liver or kidney disease or ever had a stomach ulcer or gastrointestinal bleeding, talk with your doctor before using these medicines.    As your symptoms improve, begin gradual motion at the involved joint.  Follow-up care  Follow up with your healthcare provider if you are not improving after 5 days of treatment.  When to seek medical advice  Call your healthcare provider right away if any of these occur:    Redness over the painful area    Increasing pain or swelling at the joint    Fever (1 degree above your normal temperature) lasting 24 to 48 hours Or, whatever your healthcare provider told you to report based on your condition  Date Last Reviewed: 11/21/2015 2000-2017 [employer]. [address] [facility] [address]. All rights reserved. This information is not intended as a substitute for professional medical care. Always follow your healthcare professional's instructions.        Hand and Wrist Exercises: Wrist Flexion    This exercise is designed to stretch your hands and wrists. Before beginning, read through all the instructions. While exercising, breathe normally. If you feel any pain, stop the exercise. If pain persists, inform your healthcare provider.    Hold your _____ hand in front of you with your palm down and elbow bent.    Grasp the back of that hand with your other hand. Pull back so your fingers point down as you straighten your arm. Feel a stretch in your forearm and wrist. Hold for _____ seconds. Then relax.    Repeat _____ times. Do _____ sets a day.  Date Last Reviewed: 8/16/2015 2000-2017 [employer]. [address] [place] [address]. All rights reserved. This information is not intended as a substitute for professional medical care. Always follow your healthcare professional's instructions.        Wrist Extension (Flexibility)    1. Stand or sit and hold your arms straight out in front of you.  2. Bend your right hand back at the wrist. Gently pull back on your right hand with your left hand. Don t bend your fingertips backward. Feel the stretch in your right wrist.  3. Hold for 30 to 60 seconds, then relax. Repeat 2 times.  4. Switch arms and repeat.  Date Last Reviewed: 3/10/2016    0225-9007 [employer]. [address] [place] [address]. All rights reserved. This information is not intended as a substitute for professional medical care. Always follow your healthcare professional's instructions.

## 2018-04-04 NOTE — NURSING NOTE
"Chief Complaint   Patient presents with     Pain     left wrist pain     Consult     referring VIJAY Hernández       Initial /72 (Cuff Size: Adult Large)  Pulse 62  Temp 97  F (36.1  C)  Ht 1.746 m (5' 8.75\")  Wt 97.1 kg (214 lb)  BMI 31.83 kg/m2 Estimated body mass index is 31.83 kg/(m^2) as calculated from the following:    Height as of this encounter: 1.746 m (5' 8.75\").    Weight as of this encounter: 97.1 kg (214 lb).  Medication Reconciliation: complete    BP completed using cuff size: NA (Not Taken)    Mishel Toney MA      "

## 2018-06-22 ENCOUNTER — TELEPHONE (OUTPATIENT)
Dept: FAMILY MEDICINE | Facility: OTHER | Age: 48
End: 2018-06-22

## 2018-06-22 NOTE — TELEPHONE ENCOUNTER
Summary:    Patient is due/failing the following:   LDL, PAP and PHYSICAL    Action needed:   Patient needs office visit for PAP and Physical. and Patient needs fasting lab only appointment    Type of outreach:    Phone, spoke to patient.  patient will call back to schedule      Questions for provider review:    None                                                                                                                                    Judy Cote       Chart routed to Care Team .        Panel Management Review      Patient has the following on her problem list:     Depression / Dysthymia review    Measure:  Needs PHQ-9 score of 4 or less during index window.  Administer PHQ-9 and if score is 5 or more, send encounter to provider for next steps.        PHQ-9 SCORE 5/25/2016 7/27/2017 3/22/2018   Total Score - - -   Total Score MyChart - - 0   Total Score 6 3 0       If PHQ-9 recheck is 5 or more, route to provider for next steps.    Patient is due for:  PHQ9      Composite cancer screening  Chart review shows that this patient is due/due soon for the following Pap Smear

## 2018-10-11 ENCOUNTER — TELEPHONE (OUTPATIENT)
Dept: FAMILY MEDICINE | Facility: OTHER | Age: 48
End: 2018-10-11

## 2019-07-11 ENCOUNTER — OFFICE VISIT (OUTPATIENT)
Dept: FAMILY MEDICINE | Facility: OTHER | Age: 49
End: 2019-07-11
Payer: COMMERCIAL

## 2019-07-11 VITALS
HEART RATE: 73 BPM | BODY MASS INDEX: 33.62 KG/M2 | DIASTOLIC BLOOD PRESSURE: 78 MMHG | TEMPERATURE: 97.4 F | OXYGEN SATURATION: 100 % | HEIGHT: 69 IN | WEIGHT: 227 LBS | SYSTOLIC BLOOD PRESSURE: 106 MMHG | RESPIRATION RATE: 20 BRPM

## 2019-07-11 DIAGNOSIS — E78.5 HYPERLIPIDEMIA LDL GOAL <130: ICD-10-CM

## 2019-07-11 DIAGNOSIS — Z00.00 ROUTINE GENERAL MEDICAL EXAMINATION AT A HEALTH CARE FACILITY: Primary | ICD-10-CM

## 2019-07-11 DIAGNOSIS — Z13.1 SCREENING FOR DIABETES MELLITUS: ICD-10-CM

## 2019-07-11 DIAGNOSIS — R51.9 NONINTRACTABLE HEADACHE, UNSPECIFIED CHRONICITY PATTERN, UNSPECIFIED HEADACHE TYPE: ICD-10-CM

## 2019-07-11 DIAGNOSIS — R19.00 PELVIC FULLNESS: ICD-10-CM

## 2019-07-11 DIAGNOSIS — N91.2 AMENORRHEA: ICD-10-CM

## 2019-07-11 DIAGNOSIS — Z12.4 SCREENING FOR CERVICAL CANCER: ICD-10-CM

## 2019-07-11 DIAGNOSIS — H90.8 MIXED CONDUCTIVE AND SENSORINEURAL HEARING LOSS, UNSPECIFIED LATERALITY: ICD-10-CM

## 2019-07-11 DIAGNOSIS — Z23 NEED FOR VACCINATION: ICD-10-CM

## 2019-07-11 PROCEDURE — 99214 OFFICE O/P EST MOD 30 MIN: CPT | Mod: 25 | Performed by: NURSE PRACTITIONER

## 2019-07-11 PROCEDURE — 90471 IMMUNIZATION ADMIN: CPT | Performed by: NURSE PRACTITIONER

## 2019-07-11 PROCEDURE — G0145 SCR C/V CYTO,THINLAYER,RESCR: HCPCS | Performed by: NURSE PRACTITIONER

## 2019-07-11 PROCEDURE — 87624 HPV HI-RISK TYP POOLED RSLT: CPT | Performed by: NURSE PRACTITIONER

## 2019-07-11 PROCEDURE — 99396 PREV VISIT EST AGE 40-64: CPT | Mod: 25 | Performed by: NURSE PRACTITIONER

## 2019-07-11 PROCEDURE — 90715 TDAP VACCINE 7 YRS/> IM: CPT | Performed by: NURSE PRACTITIONER

## 2019-07-11 SDOH — HEALTH STABILITY: MENTAL HEALTH: HOW OFTEN DO YOU HAVE A DRINK CONTAINING ALCOHOL?: MONTHLY OR LESS

## 2019-07-11 SDOH — HEALTH STABILITY: MENTAL HEALTH: HOW MANY STANDARD DRINKS CONTAINING ALCOHOL DO YOU HAVE ON A TYPICAL DAY?: 1 OR 2

## 2019-07-11 SDOH — HEALTH STABILITY: MENTAL HEALTH: HOW OFTEN DO YOU HAVE 6 OR MORE DRINKS ON ONE OCCASION?: NEVER

## 2019-07-11 ASSESSMENT — ENCOUNTER SYMPTOMS
FEVER: 0
EYE PAIN: 0
SORE THROAT: 0
HEMATURIA: 0
ABDOMINAL PAIN: 1
CHILLS: 0
NERVOUS/ANXIOUS: 0
JOINT SWELLING: 0
COUGH: 0
PALPITATIONS: 0
DYSURIA: 0
ARTHRALGIAS: 1
HEADACHES: 1
MYALGIAS: 0
HEARTBURN: 0
CONSTIPATION: 1
DIZZINESS: 0
PARESTHESIAS: 0
NAUSEA: 0
WEAKNESS: 0
HEMATOCHEZIA: 0
DIARRHEA: 0
BREAST MASS: 0
FREQUENCY: 1
SHORTNESS OF BREATH: 1

## 2019-07-11 ASSESSMENT — PAIN SCALES - GENERAL: PAINLEVEL: MODERATE PAIN (4)

## 2019-07-11 ASSESSMENT — MIFFLIN-ST. JEOR: SCORE: 1719.28

## 2019-07-11 NOTE — PROGRESS NOTES
SUBJECTIVE:   CC: Triny Bower is an 49 year old woman who presents for preventive health visit.     Healthy Habits:     Getting at least 3 servings of Calcium per day:  Yes    Bi-annual eye exam:  NO    Dental care twice a year:  NO    Sleep apnea or symptoms of sleep apnea:  None    Diet:  Regular (no restrictions)    Frequency of exercise:  None    Taking medications regularly:  Not Applicable    Medication side effects:  Not applicable    PHQ-2 Total Score: 0    Additional concerns today:  No    She has several additional concerns today:     Headache  Duration of complaint: 3 days   Description:   Location: bilateral in the frontal area, bilateral in the temporal area   Character: dull pain, achey  Frequency:  Constant, builds up to more pain at times  Duration:  3 days  Intensity: 4/10  Progression of Symptoms:  improving  Accompanying Signs & Symptoms:  Stiff neck: no  Neck or upper back pain: no  Fever: no  Sinus pressure: YES- today only  Nausea or vomiting: YES- nausea yesterday  Dizziness: no  Numbness: no  Weakness: no  Visual changes: no  History:   Head trauma: no  Family history of migraines: no  Previous tests for headaches: no  Neurologist evaluations: no  Able to do daily activities: YES  Wake with a headaches: YES- today only  Do headaches wake you up: no  Daily pain medication use: YES- tylenol severe cold  Work/school stressors/changes: YES  Precipitating factors:   Does light make it worse: no  Does sound make it worse: no  Alleviating factors:  Does sleep help: YES  Therapies Tried and outcome: Tylenol    This seems to be getting better today.  No history of migraines.      Vaginal Bleeding (Dysmenorrhea)      Onset: last 2 months    Description:  Duration of bleeding episodes: no menses since May  Frequency between periods:    Describe bleeding/flow:   Clots: no  Number of pads/hour:    Cramping: none     Intensity:  mild    Accompanying signs and symptoms: pelvic fullness      History  (similar episodes/previous evaluation): Typically menses have been very regular.  The last two months she has had no menses.  This is unusual for her.  She has had a tubal ligation       Precipitating or alleviating factors: None    Therapies tried and outcome: None     Also wants her ears cleaned today.  They are itchy and she feels there is wax buildup  She has had hearing loss for many years.  Has not had an audiology evaluation.       Today's PHQ-2 Score:   PHQ-2 ( 1999 [employer]) 7/11/2019   Q1: Little interest or pleasure in doing things 0   Q2: Feeling down, depressed or hopeless 0   PHQ-2 Score 0   Q1: Little interest or pleasure in doing things Not at all   Q2: Feeling down, depressed or hopeless Not at all   PHQ-2 Score 0       Abuse: Current or Past(Physical, Sexual or Emotional)- No  Do you feel safe in your environment? Yes    Social History     Tobacco Use     Smoking status: Never Smoker     Smokeless tobacco: Never Used   Substance Use Topics     Alcohol use: Yes     Frequency: Monthly or less     Drinks per session: 1 or 2     Binge frequency: Never     Comment: Very Rare       Alcohol Use 7/11/2019   Prescreen: >3 drinks/day or >7 drinks/week? No   Prescreen: >3 drinks/day or >7 drinks/week? -   No flowsheet data found.    Reviewed orders with patient.  Reviewed health maintenance and updated orders accordingly - Yes  Lab work is in process    Mammogram not appropriate for this patient based on age.    Pertinent mammograms are reviewed under the imaging tab.  History of abnormal Pap smear: NO - age 30-65 PAP every 5 years with negative HPV co-testing recommended  PAP / HPV 10/4/2012   PAP NIL     Reviewed and updated as needed this visit by clinical staff  Tobacco  Allergies  Meds  Soc Hx        Reviewed and updated as needed this visit by Provider        Past Medical History:   Diagnosis Date     Displacement of lumbar intervertebral disc without myelopathy 01/20/10    Transfer to [facility]      "Hyperlipidemia LDL goal <130 3/22/2018      Past Surgical History:   Procedure Laterality Date     BACK SURGERY       HEMILAMINECTOMY, DISCECTOMY LUMBAR TWO LEVELS, COMBINED  8/22/2012    Procedure: COMBINED HEMILAMINECTOMY, DISCECTOMY LUMBAR TWO LEVELS;  Discectomy right Lumbar 4-Sacral 1, Hemilaminectomy bilateral Lumbar 4-Sacral 1;  Surgeon: Shree Martinez MD;  Location: [facility] OR     LAMINECT/DISCECTOMY, LUMBAR  01/20/10    L4-5     TUBAL LIGATION  1995       Review of Systems   Constitutional: Negative for chills and fever.   HENT: Positive for congestion and hearing loss. Negative for ear pain and sore throat.    Eyes: Positive for visual disturbance. Negative for pain.   Respiratory: Positive for shortness of breath. Negative for cough.    Cardiovascular: Negative for chest pain, palpitations and peripheral edema.   Gastrointestinal: Positive for abdominal pain and constipation. Negative for diarrhea, heartburn, hematochezia and nausea.   Breasts:  Negative for tenderness, breast mass and discharge.   Genitourinary: Positive for frequency. Negative for dysuria, genital sores, hematuria, pelvic pain, urgency, vaginal bleeding and vaginal discharge.   Musculoskeletal: Positive for arthralgias. Negative for joint swelling and myalgias.   Skin: Negative for rash.   Neurological: Positive for headaches. Negative for dizziness, weakness and paresthesias.   Psychiatric/Behavioral: Negative for mood changes. The patient is not nervous/anxious.      OBJECTIVE:   /78   Pulse 73   Temp 97.4  F (36.3  C) (Temporal)   Resp 20   Ht 1.745 m (5' 8.7\")   Wt 103 kg (227 lb)   SpO2 100%   BMI 33.82 kg/m    Physical Exam  GENERAL: alert, no distress and obese  EYES: Eyes grossly normal to inspection, PERRL and conjunctivae and sclerae normal  HENT: ear canals and TM's normal, nose and mouth without ulcers or lesions  NECK: no adenopathy, no asymmetry, masses, or scars and thyroid normal to palpation  RESP: " lungs clear to auscultation - no rales, rhonchi or wheezes  CV: regular rate and rhythm, normal S1 S2, no S3 or S4, no murmur, click or rub, no peripheral edema and peripheral pulses strong  ABDOMEN: soft, nontender, no hepatosplenomegaly, no masses and bowel sounds normal   (female): normal female external genitalia, normal urethral meatus, vaginal mucosa pink, moist, well rugated, and normal cervix without masses or discharge  MS: no gross musculoskeletal defects noted, no edema  SKIN: no suspicious lesions or rashes  NEURO: Normal strength and tone, mentation intact and speech normal  PSYCH: mentation appears normal, affect normal/bright    Diagnostic Test Results:  Pending     Both ears were irrigated by nursing staff with a large amount of cerumen removed.      ASSESSMENT/PLAN:   1. Routine general medical examination at a health care facility    2. Need for vaccination  - TDAP VACCINE (ADACEL) [58214.002]  - 1st  Administration  [90234]    3. Screening for cervical cancer  - Pap imaged thin layer screen with HPV - recommended age 30 - 65  - HPV High Risk Types DNA Cervical    4. Screening for diabetes mellitus    5. Hyperlipidemia LDL goal <130  - Lipid panel reflex to direct LDL Fasting; Future    6. Mixed conductive and sensorineural hearing loss, unspecified laterality  Will refer to Audiology   - AUDIOLOGY ADULT REFERRAL    7. Pelvic fullness  8.  Amenorrhea  Will refer for pelvic ultrasound.  Her maternal grandmother had gynecologic cancer of some type.  She is not sure if this was ovarian or not.    - US Pelvic Complete w Transvaginal; Future  - TSH with free T4 reflex; Future  - **Comprehensive metabolic panel FUTURE 2mo; Future  - CBC with platelets; Future    9. Nonintractable headache, unspecified chronicity pattern, unspecified headache type  She states this is improving today.  Normal neurological exam.  Will monitor for now.  If symptoms fail to improve, will need further evaluation.  "      COUNSELING:  Reviewed preventive health counseling, as reflected in patient instructions    Estimated body mass index is 33.82 kg/m  as calculated from the following:    Height as of this encounter: 1.745 m (5' 8.7\").    Weight as of this encounter: 103 kg (227 lb).    Weight management plan: Discussed healthy diet and exercise guidelines     reports that she has never smoked. She has never used smokeless tobacco.      Counseling Resources:  ATP IV Guidelines  Pooled Cohorts Equation Calculator  Breast Cancer Risk Calculator  FRAX Risk Assessment  ICSI Preventive Guidelines  Dietary Guidelines for Americans, 2010  USDA's MyPlate  ASA Prophylaxis  Lung CA Screening    In addition to the preventive visit, 25  minutes of the appointment were spent evaluating and developing a treatment plan for her additional concern(s).        BINU Mckeon [facility]  "

## 2019-07-11 NOTE — NURSING NOTE
Clinic Administered Medication Documentation      Injectable Medication Documentation    Patient was given Tdap (adacel). Prior to medication administration, verified patients identity using patient s name and date of birth. Please see MAR and medication order for additional information. Patient instructed to remain in clinic for 15 minutes and report any adverse reaction to staff immediately .      Was entire vial of medication used? Yes  Vial/Syringe: Syringe  Expiration Date:  4/10/21  Was this medication supplied by the patient? No     ................Gonzalo Jauregui LPN,   July 11, 2019,      4:33 PM,   [facility]

## 2019-07-11 NOTE — PATIENT INSTRUCTIONS
The results of the pap test take about 2 weeks to come back.  We will send a letter with these results and the recommendations for further pap tests in the future.     Schedule fasting labs in 1-2 weeks.  Orders are placed for that.     Schedule the pelvic ultrasound.     See Audiology in [place].       Preventive Health Recommendations  Female Ages 40 to 49    Yearly exam:     See your health care provider every year in order to  1. Review health changes.   2. Discuss preventive care.    3. Review your medicines if your doctor prescribed any.      Get a Pap test every three years (unless you have an abnormal result and your provider advises testing more often).      If you get Pap tests with HPV test, you only need to test every 5 years, unless you have an abnormal result. You do not need a Pap test if your uterus was removed (hysterectomy) and you have not had cancer.      You should be tested each year for STDs (sexually transmitted diseases), if you're at risk.     Ask your doctor if you should have a mammogram.      Have a colonoscopy (test for colon cancer) if someone in your family has had colon cancer or polyps before age 50.       Have a cholesterol test every 5 years.       Have a diabetes test (fasting glucose) after age 45. If you are at risk for diabetes, you should have this test every 3 years.    Shots: Get a flu shot each year. Get a tetanus shot every 10 years.     Nutrition:     Eat at least 5 servings of fruits and vegetables each day.    Eat whole-grain bread, whole-wheat pasta and brown rice instead of white grains and rice.    Get adequate Calcium and Vitamin D.      Lifestyle    Exercise at least 150 minutes a week (an average of 30 minutes a day, 5 days a week). This will help you control your weight and prevent disease.    Limit alcohol to one drink per day.    No smoking.     Wear sunscreen to prevent skin cancer.    See your dentist every six months for an exam and cleaning.

## 2019-07-12 ENCOUNTER — OFFICE VISIT (OUTPATIENT)
Dept: AUDIOLOGY | Facility: OTHER | Age: 49
End: 2019-07-12
Payer: COMMERCIAL

## 2019-07-12 ENCOUNTER — HOSPITAL ENCOUNTER (OUTPATIENT)
Dept: ULTRASOUND IMAGING | Facility: CLINIC | Age: 49
Discharge: HOME | End: 2019-07-12
Attending: NURSE PRACTITIONER | Admitting: NURSE PRACTITIONER
Payer: COMMERCIAL

## 2019-07-12 DIAGNOSIS — R19.00 PELVIC FULLNESS: ICD-10-CM

## 2019-07-12 DIAGNOSIS — H90.42 SENSORINEURAL HEARING LOSS (SNHL) OF LEFT EAR WITH UNRESTRICTED HEARING OF RIGHT EAR: Primary | ICD-10-CM

## 2019-07-12 DIAGNOSIS — E78.5 HYPERLIPIDEMIA LDL GOAL <130: ICD-10-CM

## 2019-07-12 LAB
CHOLEST SERPL-MCNC: 200 MG/DL
ERYTHROCYTE [DISTWIDTH] IN BLOOD BY AUTOMATED COUNT: 13.9 % (ref 10–15)
HCT VFR BLD AUTO: 37.6 % (ref 35–47)
HDLC SERPL-MCNC: 67 MG/DL
HGB BLD-MCNC: 12.5 G/DL (ref 11.7–15.7)
LDLC SERPL CALC-MCNC: 117 MG/DL
MCH RBC QN AUTO: 28.2 PG (ref 26.5–33)
MCHC RBC AUTO-ENTMCNC: 33.2 G/DL (ref 31.5–36.5)
MCV RBC AUTO: 85 FL (ref 78–100)
NONHDLC SERPL-MCNC: 133 MG/DL
PLATELET # BLD AUTO: 281 10E9/L (ref 150–450)
RBC # BLD AUTO: 4.44 10E12/L (ref 3.8–5.2)
TRIGL SERPL-MCNC: 81 MG/DL
TSH SERPL DL<=0.005 MIU/L-ACNC: 2.48 MU/L (ref 0.4–4)
WBC # BLD AUTO: 7.2 10E9/L (ref 4–11)

## 2019-07-12 PROCEDURE — 92550 TYMPANOMETRY & REFLEX THRESH: CPT | Performed by: AUDIOLOGIST

## 2019-07-12 PROCEDURE — 80053 COMPREHEN METABOLIC PANEL: CPT | Performed by: NURSE PRACTITIONER

## 2019-07-12 PROCEDURE — 85027 COMPLETE CBC AUTOMATED: CPT | Performed by: NURSE PRACTITIONER

## 2019-07-12 PROCEDURE — 92557 COMPREHENSIVE HEARING TEST: CPT | Performed by: AUDIOLOGIST

## 2019-07-12 PROCEDURE — 80061 LIPID PANEL: CPT | Performed by: NURSE PRACTITIONER

## 2019-07-12 PROCEDURE — 84443 ASSAY THYROID STIM HORMONE: CPT | Performed by: NURSE PRACTITIONER

## 2019-07-12 PROCEDURE — 76830 TRANSVAGINAL US NON-OB: CPT

## 2019-07-12 PROCEDURE — 36415 COLL VENOUS BLD VENIPUNCTURE: CPT | Performed by: NURSE PRACTITIONER

## 2019-07-12 PROCEDURE — 99207 ZZC NO CHARGE LOS: CPT | Performed by: AUDIOLOGIST

## 2019-07-12 NOTE — PROGRESS NOTES
AUDIOLOGY REPORT    SUBJECTIVE:  Triny Bower is a 49 year old female who was seen in the Audiology Clinic at the [facility] for audiologic evaluation, referred by BINU Mckeon CNP. The patient reports that she has had trouble hearing since she was a young adult. She reports that she had her hearing tested in 1999 or 2000 and it was found that she had some hearing loss. She reports that she has been having more trouble hearing in background noise recently, has been having trouble hearing at work, and finds herself relying on visual cues more. She reports that her own voice seems loud to her. She reports that she had an ear infection when she was 18 or 19 years old and her ears have been sensitive to pressure ever since and she has not been able to scuba dive. She reports a history of noise exposure from loud music, lawn mowers, and firearms (right handed) and that she wears hearing protection now but did not in the past. The patient denies ear pain or pressure other than with air pressure changes, and she denies dizziness.  The patient notes difficulty with communication in a variety of listening situations. The patient was unaccompanied to today's appointment.    OBJECTIVE:  Abuse Screening:  Do you feel unsafe at home or work/school? No  Do you feel threatened by someone? No  Does anyone try to keep you from having contact with others, or doing things outside of your home? No  Physical signs of abuse present? No     Otoscopic exam indicates ears are clear of cerumen bilaterally     Pure Tone Thresholds assessed using conventional audiometry with good  reliability from 250-8000 Hz bilaterally using insert earphones and circumaural headphones     RIGHT:  normal hearing sensitivity at all tested frequencies    LEFT:    normal hearing sensitivity at 250-1000 and 0924-7803 Hz with mild sensorineural hearing loss restricted to 1844-5921 Hz    Tympanogram:    RIGHT: normal eardrum mobility     LEFT:   normal eardrum mobility    Reflexes (reported by stimulus ear):  RIGHT: Ipsilateral is present at normal levels  RIGHT: Contralateral is present at normal levels  LEFT:   Ipsilateral is present at normal levels  LEFT:   Contralateral is present at normal levels    Speech Reception Threshold:    RIGHT: 15 dB HL    LEFT:   20 dB HL    Word Recognition Score:     RIGHT: 96% at 60 dB HL using NU-6 recorded word list.    LEFT:   100% at 65 dB HL using NU-6 recorded word list.      ASSESSMENT:   Sensorineural hearing loss in the left ear with unrestricted hearing in the right ear    Today s results were discussed with the patient in detail.     PLAN:  Patient was counseled regarding hearing loss and impact on communication, especially the impact of a unilateral hearing loss on understanding speech in noise, and the fact that difficulty understanding speech in noise cannot always be explained by the audiogram alone. The patient was encouraged to continue using hearing protection consistently.   Handout on good communication strategies was given to patient. The patient was given the option of making an appointment with ENT for the problems she sometimes has with equalizing her ears but she was not interested at this time. The mildly asymmetrical hearing loss is likely explained by her noise exposure history so an ENT appointment for asymmetry is not needed at this time.    It is recommended that the patient return in one year for a repeat hearing evaluation to assess stability of the unilateral hearing loss. Consider speech in noise testing at that time. Please call this clinic with questions regarding these results or recommendations.      Aleksey Monge, CCC-A  [place] Licensed Audiologist #[id number]  7/12/2019

## 2019-07-15 ENCOUNTER — TELEPHONE (OUTPATIENT)
Dept: FAMILY MEDICINE | Facility: OTHER | Age: 49
End: 2019-07-15

## 2019-07-15 DIAGNOSIS — N91.2 AMENORRHEA: Primary | ICD-10-CM

## 2019-07-15 LAB
COPATH REPORT: NORMAL
PAP: NORMAL

## 2019-07-15 NOTE — TELEPHONE ENCOUNTER
----- Message from BINU Mckeon CNP sent at 7/15/2019  6:48 AM CDT -----  Please call patient and let her know her ultrasound showed a hemorrhagic cyst on the right ovary and a fibroid in her uterus.  She also has several nabothian cysts.  These are usually benign and nothing to worry about. The hemorrhagic cyst will need further evaluation by ob/gyn.  Referral is placed.  Please assist in scheduling.     Electronically signed by Shannon Mckeon CNP.

## 2019-07-15 NOTE — TELEPHONE ENCOUNTER
Message left for patient to call me back directly.    Jaquelin Green XRO/  [facility]  [phone]

## 2019-07-16 LAB
ALBUMIN SERPL-MCNC: 3.4 G/DL (ref 3.4–5)
ALP SERPL-CCNC: 43 U/L (ref 40–150)
ALT SERPL W P-5'-P-CCNC: 21 U/L (ref 0–50)
ANION GAP SERPL CALCULATED.3IONS-SCNC: 14 MMOL/L (ref 3–14)
AST SERPL W P-5'-P-CCNC: 12 U/L (ref 0–45)
BILIRUB SERPL-MCNC: 0.5 MG/DL (ref 0.2–1.3)
BUN SERPL-MCNC: 12 MG/DL (ref 7–30)
CALCIUM SERPL-MCNC: 8.7 MG/DL (ref 8.5–10.1)
CHLORIDE SERPL-SCNC: 105 MMOL/L (ref 94–109)
CO2 SERPL-SCNC: 27 MMOL/L (ref 20–32)
CREAT SERPL-MCNC: 0.92 MG/DL (ref 0.52–1.04)
FINAL DIAGNOSIS: NORMAL
GFR SERPL CREATININE-BSD FRML MDRD: 74 ML/MIN/{1.73_M2}
GLUCOSE SERPL-MCNC: 81 MG/DL (ref 70–99)
HPV HR 12 DNA CVX QL NAA+PROBE: NEGATIVE
HPV16 DNA SPEC QL NAA+PROBE: NEGATIVE
HPV18 DNA SPEC QL NAA+PROBE: NEGATIVE
POTASSIUM SERPL-SCNC: 3.9 MMOL/L (ref 3.4–5.3)
PROT SERPL-MCNC: 6.8 G/DL (ref 6.8–8.8)
SODIUM SERPL-SCNC: 139 MMOL/L (ref 133–144)
SPECIMEN DESCRIPTION: NORMAL
SPECIMEN SOURCE CVX/VAG CYTO: NORMAL

## 2019-07-23 ENCOUNTER — TELEPHONE (OUTPATIENT)
Dept: FAMILY MEDICINE | Facility: OTHER | Age: 49
End: 2019-07-23

## 2019-07-23 ENCOUNTER — OFFICE VISIT (OUTPATIENT)
Dept: OBGYN | Facility: CLINIC | Age: 49
End: 2019-07-23
Payer: COMMERCIAL

## 2019-07-23 VITALS
DIASTOLIC BLOOD PRESSURE: 62 MMHG | HEART RATE: 72 BPM | BODY MASS INDEX: 34.16 KG/M2 | SYSTOLIC BLOOD PRESSURE: 102 MMHG | WEIGHT: 229.3 LBS

## 2019-07-23 DIAGNOSIS — Z12.31 ENCOUNTER FOR SCREENING MAMMOGRAM FOR BREAST CANCER: ICD-10-CM

## 2019-07-23 DIAGNOSIS — D25.1 INTRAMURAL AND SUBMUCOUS LEIOMYOMA OF UTERUS: Primary | ICD-10-CM

## 2019-07-23 DIAGNOSIS — D25.0 INTRAMURAL AND SUBMUCOUS LEIOMYOMA OF UTERUS: Primary | ICD-10-CM

## 2019-07-23 PROCEDURE — 99244 OFF/OP CNSLTJ NEW/EST MOD 40: CPT | Performed by: OBSTETRICS & GYNECOLOGY

## 2019-07-23 SDOH — HEALTH STABILITY: MENTAL HEALTH: HOW OFTEN DO YOU HAVE A DRINK CONTAINING ALCOHOL?: NOT ASKED

## 2019-07-23 SDOH — HEALTH STABILITY: MENTAL HEALTH: HOW MANY STANDARD DRINKS CONTAINING ALCOHOL DO YOU HAVE ON A TYPICAL DAY?: NOT ASKED

## 2019-07-23 SDOH — HEALTH STABILITY: MENTAL HEALTH: HOW OFTEN DO YOU HAVE 6 OR MORE DRINKS ON ONE OCCASION?: NOT ASKED

## 2019-07-23 NOTE — TELEPHONE ENCOUNTER
Called to schedule surgery. There was a bad connection on the phone line and she was not able to hear me.

## 2019-07-24 NOTE — PROGRESS NOTES
Consult Note    Consult requested by Shannon Mckeon regarding abnormal pelvic Ultrasound results    HPI:  Triny Bower is a 49 year old  seen in consultation for new onset amenorrhea (since April) and abnormal Ultrasound results. Prior normal, monthly periods without bothersome features. New symptoms since April include early satiety, deep pelvic pressure including rectal pressure. She otherwise denies bowel or urinary symptoms, vaginal symptoms, any other new systemic symptoms. She does report 40 lb weight loss last year and now having gained nearly all of it back, was trying to loose weight though diet failure now due to stress and poor diet choices. She has a remote hx of tubal ligation, sexually active without concerning symptoms.     Ob Hx:  s/p  x2  Gyn Hx: Patient's last menstrual period was 2019.  Menses as above   Last pap NIL, HPV- ()   Last mammogram wnl (10/12)   Using tubal ligation for birth control  PMH:   Past Medical History:   Diagnosis Date     Displacement of lumbar intervertebral disc without myelopathy 01/20/10    Transfer to [facility]     Hyperlipidemia LDL goal <130 3/22/2018     SurgHx:   Past Surgical History:   Procedure Laterality Date     BACK SURGERY  -     HEMILAMINECTOMY, DISCECTOMY LUMBAR TWO LEVELS, COMBINED  2012    Procedure: COMBINED HEMILAMINECTOMY, DISCECTOMY LUMBAR TWO LEVELS;  Discectomy right Lumbar 4-Sacral 1, Hemilaminectomy bilateral Lumbar 4-Sacral 1;  Surgeon: Shree Martinez MD;  Location: [facility] OR     LAMINECT/DISCECTOMY, LUMBAR  01/20/10    L4-5     TUBAL LIGATION       FamHx:   Family History   Problem Relation Age of Onset     Cancer Maternal Grandmother         cervical cancer     Gynecology Maternal Grandmother         cervical cancer     Arthritis Paternal Grandmother      Osteoporosis Paternal Grandmother      Breast Cancer Mother         70's     SocHx:   Social History     Socioeconomic History     Marital status:       Spouse name: None     Number of children: None     Years of education: None     Highest education level: None   Occupational History     None   Social Needs     Financial resource strain: None     Food insecurity:     Worry: None     Inability: None     Transportation needs:     Medical: None     Non-medical: None   Tobacco Use     Smoking status: Never Smoker     Smokeless tobacco: Never Used   Substance and Sexual Activity     Alcohol use: Not Currently     Comment: Very Rare     Drug use: No     Sexual activity: Yes     Partners: Male     Birth control/protection: Female Surgical     Comment: not very often   Lifestyle     Physical activity:     Days per week: None     Minutes per session: None     Stress: None   Relationships     Social connections:     Talks on phone: None     Gets together: None     Attends Jain service: None     Active member of club or organization: None     Attends meetings of clubs or organizations: None     Relationship status: None     Intimate partner violence:     Fear of current or ex partner: None     Emotionally abused: None     Physically abused: None     Forced sexual activity: None   Other Topics Concern     Parent/sibling w/ CABG, MI or angioplasty before 65F 55M? Not Asked   Social History Narrative     None     Allergies:   No known drug allergy  Current Medications:   No current outpatient medications on file.     ROS: 10 point ROS negative other than as noted in the HPI    EXAM:  Vitals:    07/23/19 0735   BP: 102/62   BP Location: Right arm   Cuff Size: Adult Regular   Pulse: 72   Weight: 104 kg (229 lb 4.8 oz)    Body mass index is 34.16 kg/m .    Gen: Alert, oriented, appropriately interactive, NAD  CV: RRR,  2+ peripheral pulses  Resp: CTAB, good effort without distress   Breasts: no axillary adenopathy, no dominant masses, no skin changes, no nipple discharge, nontender  Abdomen: soft, non tender, non distended, no masses, no hernias. No inguinal  lymphadenopathy.   Perineum: no lesions; normal appearing external genitalia, bartholins glands, urethra, skenes glands  Vagina: no masses or lesions or discharge, normally rugated.  Cervix: no masses or lesions or discharge   Bimanual exam:   Nontender pelvic floor muscles  Urethra: nontender   Bladder: nontender  Uterus: midline, anteverted, mildly enlarged, mobile  no masses, mildly tender  Adnexa: no masses or tenderness appreciated   No cervical motion tenderness  MSK: normal gait, symmetric movements UE & LE  Lower extremities: non-tender, no edema    Labs & Imaging:  Hgb 12.5  TSH 2.48  Cr 0.92    Pelvic Ultrasound (7/12/19): The uterus measures 11.4 x 4.8 x 6.7 cm. In the right  fundal region, adjacent to the endometrial stripe, there is a 1.4 x  1.3 x 1.2 cm heterogeneously echoic structure likely representing a  fibroid. This was not well-seen on the prior study. Endometrial stripe  is borderline abnormally thickened, for a premenopausal female, at 1.9  cm. Multiple nabothian cysts are seen in the cervix. These appear to  have increased since the prior study.  The right ovary measures 3.4 x 3.0 x 2.8 cm and contains a  simple-appearing cyst measuring 1.6 x 1.5 1.5 cm and a complex cystic  structure measuring 1.6 x 2.1 x 1.4 cm. Left ovary measures 4.5 x 2.6  x 3.4 cm and contains a simple-appearing cystic structure measuring  2.4 x 1.8 x 2.1 cm.  No abnormal free fluid collections are seen in the cul-de-sac.                                                          IMPRESSION:  1. Probable hemorrhagic cyst right ovary. Otherwise follicles are seen  in the bilateral ovaries.  2. Fibroid in the uterus is adjacent to the endometrial stripe and  could be submucosal.  3. Multiple nabothian cysts in the cervix have increased in size and  number since the prior study from 2013.  4. Endometrial stripe is borderline abnormally thickened for a  premenopausal female measuring up to 1.9 cm.  5. No other abnormalities  or changes. No definite etiology for  patient's pelvic fullness is seen.    Lab Results   Component Value Date    PAP NIL 07/11/2019    PAP NIL 10/04/2012       ASSESSMENT/PLAN: Triny Bower is a 49 year old  seen in consultation for new onset amenorrhea (since April) and pelvic Ultrasound showing likely submucosal fibroid and thickened endometrial stripe of 1.9 cm. Likely benign condition secondary to the fibroid, though given age and endometrium finding, malignancy cannot be ruled out. Offered to being with EMB vs plan a diagnostic hysteroscopy, myosure myomectomy, dilation & curettage for both diagnosis and management. Risk of EMB include likely need for surgery anyway for management. Risk of Hysteroscopy include possible need ofr further surgery if any malignancy discovered. Reviewed risks of surgery including infection, bleeding, injury to surrounding structures including possible uterine perforation. She understands and wished to proceed.     1. Intramural and submucous leiomyoma of uterus  - Diagnostic hysteroscopy, myosure myomectomy, dilation & curettage for a likely submucosal fibroid  - Ruth-Operative Worksheet    2. Encounter for screening mammogram for breast cancer  - Normal breast exam today  - *MA Screening Digital Bilateral; Future    Viral Hodges MD  7/23/2019 9:46 PM

## 2019-07-25 ENCOUNTER — OFFICE VISIT (OUTPATIENT)
Dept: FAMILY MEDICINE | Facility: OTHER | Age: 49
End: 2019-07-25
Payer: COMMERCIAL

## 2019-07-25 VITALS
HEIGHT: 69 IN | WEIGHT: 229 LBS | OXYGEN SATURATION: 98 % | HEART RATE: 69 BPM | DIASTOLIC BLOOD PRESSURE: 70 MMHG | RESPIRATION RATE: 20 BRPM | SYSTOLIC BLOOD PRESSURE: 108 MMHG | BODY MASS INDEX: 33.92 KG/M2 | TEMPERATURE: 97.6 F

## 2019-07-25 DIAGNOSIS — Z01.818 PREOP GENERAL PHYSICAL EXAM: Primary | ICD-10-CM

## 2019-07-25 DIAGNOSIS — D25.9 UTERINE LEIOMYOMA, UNSPECIFIED LOCATION: ICD-10-CM

## 2019-07-25 PROCEDURE — 99214 OFFICE O/P EST MOD 30 MIN: CPT | Performed by: NURSE PRACTITIONER

## 2019-07-25 ASSESSMENT — MIFFLIN-ST. JEOR: SCORE: 1728.35

## 2019-07-25 ASSESSMENT — PAIN SCALES - GENERAL: PAINLEVEL: NO PAIN (0)

## 2019-07-25 NOTE — PROGRESS NOTES
[facility]  [address] [facility] [address]  [phone]  Dept: [phone]    PRE-OP EVALUATION:  Today's date: 2019    Triny Bower (: 1970) presents for pre-operative evaluation assessment as requested by Dr. Hodges.  She requires evaluation and anesthesia risk assessment prior to undergoing surgery/procedure for treatment of Hysteroscopy, myomectomy, d&c .    Fax number for surgical facility: [facility]  Primary Physician: Otis Samaniego  Type of Anesthesia Anticipated: to be determined    Patient has a Health Care Directive or Living Will:  NO    Preop Questions 2019   Who is doing your surgery? Dr. Moore   What are you having done? dont know the name   Date of Surgery/Procedure: [id number]   Facility or Hospital where procedure/surgery will be performed: [facility]   1.  Do you have a history of Heart attack, stroke, stent, coronary bypass surgery, or other heart surgery? No   2.  Do you ever have any pain or discomfort in your chest? No   3.  Do you have a history of  Heart Failure? No   4.   Are you troubled by shortness of breath when:  walking on a level surface, or up a slight hill, or at night? No   5.  Do you currently have a cold, bronchitis or other respiratory infection? No   6.  Do you have a cough, shortness of breath, or wheezing? No   7.  Do you sometimes get pains in the calves of your legs when you walk? No   8. Do you or anyone in your family have previous history of blood clots? YES - no personal history, father had blood clots.    9.  Do you or does anyone in your family have a serious bleeding problem such as prolonged bleeding following surgeries or cuts? No   10. Have you ever had problems with anemia or been told to take iron pills? No   11. Have you had any abnormal blood loss such as black, tarry or bloody stools, or abnormal vaginal bleeding? No   12. Have you ever had a blood transfusion? No   13. Have you or any of your relatives  ever had problems with anesthesia? YES -  No personal history.  Mom is extra sensitive to anesthesia.    14. Do you have sleep apnea, excessive snoring or daytime drowsiness? No   15. Do you have any prosthetic heart valves? No   16. Do you have prosthetic joints? No   17. Is there any chance that you may be pregnant? No         HPI:     HPI related to upcoming procedure: uterine fibroid, thickened endometrium       See problem list for active medical problems.  Problems all longstanding and stable, except as noted/documented.  See ROS for pertinent symptoms related to these conditions.      MEDICAL HISTORY:     Patient Active Problem List    Diagnosis Date Noted     Tendinitis of left wrist 04/04/2018     Priority: Medium     Hyperlipidemia LDL goal <130 03/22/2018     Priority: Medium     Gastroesophageal reflux disease without esophagitis 06/29/2015     Priority: Medium     Constipation, unspecified constipation type 06/29/2015     Priority: Medium     Major depressive disorder, recurrent episode, mild (H) 10/04/2012     Priority: Medium     Displacement of lumbar intervertebral disc without myelopathy 08/21/2012     Priority: Medium     Herniated nucleus pulposus, L5-S1, right 06/06/2012     Priority: Medium     historically       Lumbar radiculopathy 12/17/2010     Priority: Medium      Past Medical History:   Diagnosis Date     Displacement of lumbar intervertebral disc without myelopathy 01/20/10    Transfer to [facility]     Hyperlipidemia LDL goal <130 3/22/2018     Past Surgical History:   Procedure Laterality Date     BACK SURGERY       HEMILAMINECTOMY, DISCECTOMY LUMBAR TWO LEVELS, COMBINED  8/22/2012    Procedure: COMBINED HEMILAMINECTOMY, DISCECTOMY LUMBAR TWO LEVELS;  Discectomy right Lumbar 4-Sacral 1, Hemilaminectomy bilateral Lumbar 4-Sacral 1;  Surgeon: Shree Martinez MD;  Location: [facility] OR     LAMINECT/DISCECTOMY, LUMBAR  01/20/10    L4-5     TUBAL LIGATION  1995     No current outpatient  "medications on file.     OTC products: none    Allergies   Allergen Reactions     No Known Drug Allergy       Latex Allergy: NO    Social History     Tobacco Use     Smoking status: Never Smoker     Smokeless tobacco: Never Used   Substance Use Topics     Alcohol use: Not Currently     Comment: Very Rare     History   Drug Use No       REVIEW OF SYSTEMS:   CONSTITUTIONAL: NEGATIVE for fever, chills, change in weight  INTEGUMENTARY/SKIN: NEGATIVE for worrisome rashes, moles or lesions  EYES: NEGATIVE for vision changes or irritation  ENT/MOUTH: NEGATIVE for ear, mouth and throat problems  RESP: NEGATIVE for significant cough or SOB  BREAST: NEGATIVE for masses, tenderness or discharge  CV: NEGATIVE for chest pain, palpitations or peripheral edema  GI: NEGATIVE for nausea,  heartburn, or change in bowel habits, POSITIVE for pelvic pressure  : NEGATIVE for frequency, dysuria, or hematuria  MUSCULOSKELETAL: NEGATIVE for significant arthralgias or myalgia  NEURO: NEGATIVE for weakness, dizziness or paresthesias  ENDOCRINE: NEGATIVE for temperature intolerance, skin/hair changes  HEME: NEGATIVE for bleeding problems  PSYCHIATRIC: NEGATIVE for changes in mood or affect    EXAM:   /70   Pulse 69   Temp 97.6  F (36.4  C) (Temporal)   Resp 20   Ht 1.745 m (5' 8.7\")   Wt 103.9 kg (229 lb)   LMP 05/20/2019 (Exact Date)   SpO2 98%   Breastfeeding? No   BMI 34.11 kg/m      GENERAL APPEARANCE: healthy, alert and no distress     EYES: EOMI, PERRL     HENT: ear canals and TM's normal and nose and mouth without ulcers or lesions     NECK: no adenopathy, no asymmetry, masses, or scars and thyroid normal to palpation     RESP: lungs clear to auscultation - no rales, rhonchi or wheezes     CV: regular rates and rhythm, normal S1 S2, no S3 or S4 and no murmur, click or rub     ABDOMEN:  soft, nontender, no HSM or masses and bowel sounds normal     MS: extremities normal- no gross deformities noted, no evidence of " inflammation in joints, FROM in all extremities.     SKIN: no suspicious lesions or rashes     NEURO: Normal strength and tone, sensory exam grossly normal, mentation intact and speech normal     PSYCH: mentation appears normal. and affect normal/bright     LYMPHATICS: No cervical adenopathy    DIAGNOSTICS:       Recent Labs   Lab Test 07/12/19  0954 06/29/15  1649 01/02/13  2120   HGB 12.5  --  12.2     --  327    139  --    POTASSIUM 3.9 3.6  --    CR 0.92 0.94  --         IMPRESSION:   Reason for surgery/procedure: uterine fibroid     The proposed surgical procedure is considered INTERMEDIATE risk.    REVISED CARDIAC RISK INDEX  The patient has the following serious cardiovascular risks for perioperative complications such as (MI, PE, VFib and 3  AV Block):  No serious cardiac risks  INTERPRETATION: 0 risks: Class I (very low risk - 0.4% complication rate)    The patient has the following additional risks for perioperative complications:  No identified additional risks      ICD-10-CM    1. Preop general physical exam Z01.818    2. Uterine leiomyoma, unspecified location D25.9        RECOMMENDATIONS:       --Patient is on no chronic medications    APPROVAL GIVEN to proceed with proposed procedure, without further diagnostic evaluation       Signed Electronically by: BINU Mckeon CNP    Copy of this evaluation report is provided to requesting physician.    Ed Preop Guidelines    Revised Cardiac Risk Index

## 2019-07-25 NOTE — TELEPHONE ENCOUNTER
Type of surgery: diagnostic hysteroscopy, myosure myomectomy, dilation and curettage  Location of surgery: [facility]   Date of surgery: 7/31/19  Surgeon: Dr. Hodges  Pre-Op Appt Date: 7/25/19  Post-Op Appt Date: 8/20/19   Packet sent out: Surgery packet mailed to patient's home address.   Pre-cert/Authorization completed: NA  Date: 7/25/2019    Jovanna Gregorio  Surgery Scheduler

## 2019-07-25 NOTE — PATIENT INSTRUCTIONS
Don't take any more Ibuprofen, Aleve, Naproxen or Aspirin prior to surgery.  Tylenol and/or prescription pain pills are okay to use if needed.         Before Your Surgery      Call your surgeon if there is any change in your health. This includes signs of a cold or flu (such as a sore throat, runny nose, cough, rash or fever).    Do not smoke, drink alcohol or take over the counter medicine (unless your surgeon or primary care doctor tells you to) for the 24 hours before and after surgery.    If you take prescribed drugs: Follow your doctor s orders about which medicines to take and which to stop until after surgery.    Eating and drinking prior to surgery: follow the instructions from your surgeon    Take a shower or bath the night before surgery. Use the soap your surgeon gave you to gently clean your skin. If you do not have soap from your surgeon, use your regular soap. Do not shave or scrub the surgery site.  Wear clean pajamas and have clean sheets on your bed.

## 2019-07-31 ENCOUNTER — ANESTHESIA (OUTPATIENT)
Dept: SURGERY | Facility: CLINIC | Age: 49
End: 2019-07-31
Payer: COMMERCIAL

## 2019-07-31 ENCOUNTER — HOSPITAL ENCOUNTER (OUTPATIENT)
Facility: CLINIC | Age: 49
Discharge: HOME | End: 2019-07-31
Attending: OBSTETRICS & GYNECOLOGY | Admitting: OBSTETRICS & GYNECOLOGY
Payer: COMMERCIAL

## 2019-07-31 ENCOUNTER — ANESTHESIA EVENT (OUTPATIENT)
Dept: SURGERY | Facility: CLINIC | Age: 49
End: 2019-07-31
Payer: COMMERCIAL

## 2019-07-31 VITALS
RESPIRATION RATE: 16 BRPM | HEART RATE: 58 BPM | SYSTOLIC BLOOD PRESSURE: 121 MMHG | TEMPERATURE: 97.4 F | OXYGEN SATURATION: 98 % | DIASTOLIC BLOOD PRESSURE: 90 MMHG

## 2019-07-31 DIAGNOSIS — Z98.890 STATUS POST HYSTEROSCOPIC ABLATION OF ENDOMETRIUM: Primary | ICD-10-CM

## 2019-07-31 LAB
ABO + RH BLD: NORMAL
ABO + RH BLD: NORMAL
B-HCG SERPL-ACNC: <1 IU/L (ref 0–5)
BLD GP AB SCN SERPL QL: NORMAL
BLOOD BANK CMNT PATIENT-IMP: NORMAL
ERYTHROCYTE [DISTWIDTH] IN BLOOD BY AUTOMATED COUNT: 14 % (ref 10–15)
HCT VFR BLD AUTO: 37.8 % (ref 35–47)
HGB BLD-MCNC: 12.7 G/DL (ref 11.7–15.7)
MCH RBC QN AUTO: 28 PG (ref 26.5–33)
MCHC RBC AUTO-ENTMCNC: 33.6 G/DL (ref 31.5–36.5)
MCV RBC AUTO: 83 FL (ref 78–100)
PLATELET # BLD AUTO: 338 10E9/L (ref 150–450)
RBC # BLD AUTO: 4.53 10E12/L (ref 3.8–5.2)
SPECIMEN EXP DATE BLD: NORMAL
WBC # BLD AUTO: 5.4 10E9/L (ref 4–11)

## 2019-07-31 PROCEDURE — 85027 COMPLETE CBC AUTOMATED: CPT | Performed by: OBSTETRICS & GYNECOLOGY

## 2019-07-31 PROCEDURE — 25800030 ZZH RX IP 258 OP 636: Performed by: NURSE ANESTHETIST, CERTIFIED REGISTERED

## 2019-07-31 PROCEDURE — 36415 COLL VENOUS BLD VENIPUNCTURE: CPT | Performed by: OBSTETRICS & GYNECOLOGY

## 2019-07-31 PROCEDURE — 36000056 ZZH SURGERY LEVEL 3 1ST 30 MIN: Performed by: OBSTETRICS & GYNECOLOGY

## 2019-07-31 PROCEDURE — C1888 ENDOVAS NON-CARDIAC ABL CATH: HCPCS | Performed by: OBSTETRICS & GYNECOLOGY

## 2019-07-31 PROCEDURE — 86850 RBC ANTIBODY SCREEN: CPT | Performed by: OBSTETRICS & GYNECOLOGY

## 2019-07-31 PROCEDURE — 84702 CHORIONIC GONADOTROPIN TEST: CPT | Performed by: OBSTETRICS & GYNECOLOGY

## 2019-07-31 PROCEDURE — 37000009 ZZH ANESTHESIA TECHNICAL FEE, EACH ADDTL 15 MIN: Performed by: OBSTETRICS & GYNECOLOGY

## 2019-07-31 PROCEDURE — 88305 TISSUE EXAM BY PATHOLOGIST: CPT | Mod: 26 | Performed by: OBSTETRICS & GYNECOLOGY

## 2019-07-31 PROCEDURE — 25000128 H RX IP 250 OP 636: Performed by: NURSE ANESTHETIST, CERTIFIED REGISTERED

## 2019-07-31 PROCEDURE — 86901 BLOOD TYPING SEROLOGIC RH(D): CPT | Performed by: OBSTETRICS & GYNECOLOGY

## 2019-07-31 PROCEDURE — 25000125 ZZHC RX 250: Performed by: OBSTETRICS & GYNECOLOGY

## 2019-07-31 PROCEDURE — 40000306 ZZH STATISTIC PRE PROC ASSESS II: Performed by: OBSTETRICS & GYNECOLOGY

## 2019-07-31 PROCEDURE — 88305 TISSUE EXAM BY PATHOLOGIST: CPT | Performed by: OBSTETRICS & GYNECOLOGY

## 2019-07-31 PROCEDURE — 86900 BLOOD TYPING SEROLOGIC ABO: CPT | Performed by: OBSTETRICS & GYNECOLOGY

## 2019-07-31 PROCEDURE — 27210794 ZZH OR GENERAL SUPPLY STERILE: Performed by: OBSTETRICS & GYNECOLOGY

## 2019-07-31 PROCEDURE — 25000125 ZZHC RX 250: Performed by: NURSE ANESTHETIST, CERTIFIED REGISTERED

## 2019-07-31 PROCEDURE — 71000027 ZZH RECOVERY PHASE 2 EACH 15 MINS: Performed by: OBSTETRICS & GYNECOLOGY

## 2019-07-31 PROCEDURE — 37000008 ZZH ANESTHESIA TECHNICAL FEE, 1ST 30 MIN: Performed by: OBSTETRICS & GYNECOLOGY

## 2019-07-31 PROCEDURE — 25000564 ZZH DESFLURANE, EA 15 MIN: Performed by: OBSTETRICS & GYNECOLOGY

## 2019-07-31 PROCEDURE — 36000058 ZZH SURGERY LEVEL 3 EA 15 ADDTL MIN: Performed by: OBSTETRICS & GYNECOLOGY

## 2019-07-31 PROCEDURE — 58563 HYSTEROSCOPY ABLATION: CPT | Performed by: OBSTETRICS & GYNECOLOGY

## 2019-07-31 RX ORDER — KETOROLAC TROMETHAMINE 10 MG/1
10 TABLET, FILM COATED ORAL EVERY 6 HOURS PRN
Qty: 12 TABLET | Refills: 0 | Status: SHIPPED | OUTPATIENT
Start: 2019-07-31 | End: 2019-08-03

## 2019-07-31 RX ORDER — SODIUM CHLORIDE, SODIUM LACTATE, POTASSIUM CHLORIDE, CALCIUM CHLORIDE 600; 310; 30; 20 MG/100ML; MG/100ML; MG/100ML; MG/100ML
INJECTION, SOLUTION INTRAVENOUS CONTINUOUS
Status: DISCONTINUED | OUTPATIENT
Start: 2019-07-31 | End: 2019-07-31 | Stop reason: HOSPADM

## 2019-07-31 RX ORDER — DEXAMETHASONE SODIUM PHOSPHATE 4 MG/ML
INJECTION, SOLUTION INTRA-ARTICULAR; INTRALESIONAL; INTRAMUSCULAR; INTRAVENOUS; SOFT TISSUE PRN
Status: DISCONTINUED | OUTPATIENT
Start: 2019-07-31 | End: 2019-07-31

## 2019-07-31 RX ORDER — ONDANSETRON 2 MG/ML
4 INJECTION INTRAMUSCULAR; INTRAVENOUS EVERY 30 MIN PRN
Status: DISCONTINUED | OUTPATIENT
Start: 2019-07-31 | End: 2019-07-31 | Stop reason: HOSPADM

## 2019-07-31 RX ORDER — FENTANYL CITRATE 50 UG/ML
INJECTION, SOLUTION INTRAMUSCULAR; INTRAVENOUS PRN
Status: DISCONTINUED | OUTPATIENT
Start: 2019-07-31 | End: 2019-07-31

## 2019-07-31 RX ORDER — ONDANSETRON 4 MG/1
4-8 TABLET, ORALLY DISINTEGRATING ORAL EVERY 8 HOURS PRN
Qty: 4 TABLET | Refills: 0 | Status: SHIPPED | OUTPATIENT
Start: 2019-07-31 | End: 2020-06-12

## 2019-07-31 RX ORDER — LIDOCAINE 40 MG/G
CREAM TOPICAL
Status: DISCONTINUED | OUTPATIENT
Start: 2019-07-31 | End: 2019-07-31 | Stop reason: HOSPADM

## 2019-07-31 RX ORDER — LIDOCAINE HYDROCHLORIDE 20 MG/ML
INJECTION, SOLUTION INFILTRATION; PERINEURAL PRN
Status: DISCONTINUED | OUTPATIENT
Start: 2019-07-31 | End: 2019-07-31

## 2019-07-31 RX ORDER — DIMENHYDRINATE 50 MG/ML
25 INJECTION, SOLUTION INTRAMUSCULAR; INTRAVENOUS
Status: DISCONTINUED | OUTPATIENT
Start: 2019-07-31 | End: 2019-07-31 | Stop reason: HOSPADM

## 2019-07-31 RX ORDER — ACETAMINOPHEN 325 MG/1
650 TABLET ORAL
Status: CANCELLED | OUTPATIENT
Start: 2019-07-31

## 2019-07-31 RX ORDER — HYDROMORPHONE HYDROCHLORIDE 1 MG/ML
.3-.5 INJECTION, SOLUTION INTRAMUSCULAR; INTRAVENOUS; SUBCUTANEOUS EVERY 10 MIN PRN
Status: DISCONTINUED | OUTPATIENT
Start: 2019-07-31 | End: 2019-07-31 | Stop reason: HOSPADM

## 2019-07-31 RX ORDER — ONDANSETRON 2 MG/ML
INJECTION INTRAMUSCULAR; INTRAVENOUS PRN
Status: DISCONTINUED | OUTPATIENT
Start: 2019-07-31 | End: 2019-07-31

## 2019-07-31 RX ORDER — MEPERIDINE HYDROCHLORIDE 25 MG/ML
12.5 INJECTION INTRAMUSCULAR; INTRAVENOUS; SUBCUTANEOUS
Status: DISCONTINUED | OUTPATIENT
Start: 2019-07-31 | End: 2019-07-31 | Stop reason: HOSPADM

## 2019-07-31 RX ORDER — KETOROLAC TROMETHAMINE 30 MG/ML
INJECTION, SOLUTION INTRAMUSCULAR; INTRAVENOUS PRN
Status: DISCONTINUED | OUTPATIENT
Start: 2019-07-31 | End: 2019-07-31

## 2019-07-31 RX ORDER — BUPIVACAINE HYDROCHLORIDE AND EPINEPHRINE 2.5; 5 MG/ML; UG/ML
INJECTION, SOLUTION INFILTRATION; PERINEURAL PRN
Status: DISCONTINUED | OUTPATIENT
Start: 2019-07-31 | End: 2019-07-31 | Stop reason: HOSPADM

## 2019-07-31 RX ORDER — ONDANSETRON 4 MG/1
4 TABLET, ORALLY DISINTEGRATING ORAL
Status: CANCELLED | OUTPATIENT
Start: 2019-07-31

## 2019-07-31 RX ORDER — FENTANYL CITRATE 50 UG/ML
25-50 INJECTION, SOLUTION INTRAMUSCULAR; INTRAVENOUS
Status: DISCONTINUED | OUTPATIENT
Start: 2019-07-31 | End: 2019-07-31 | Stop reason: HOSPADM

## 2019-07-31 RX ORDER — NALOXONE HYDROCHLORIDE 0.4 MG/ML
.1-.4 INJECTION, SOLUTION INTRAMUSCULAR; INTRAVENOUS; SUBCUTANEOUS
Status: DISCONTINUED | OUTPATIENT
Start: 2019-07-31 | End: 2019-07-31 | Stop reason: HOSPADM

## 2019-07-31 RX ORDER — PROPOFOL 10 MG/ML
INJECTION, EMULSION INTRAVENOUS CONTINUOUS PRN
Status: DISCONTINUED | OUTPATIENT
Start: 2019-07-31 | End: 2019-07-31

## 2019-07-31 RX ORDER — PROPOFOL 10 MG/ML
INJECTION, EMULSION INTRAVENOUS PRN
Status: DISCONTINUED | OUTPATIENT
Start: 2019-07-31 | End: 2019-07-31

## 2019-07-31 RX ORDER — ONDANSETRON 4 MG/1
4 TABLET, ORALLY DISINTEGRATING ORAL EVERY 30 MIN PRN
Status: DISCONTINUED | OUTPATIENT
Start: 2019-07-31 | End: 2019-07-31 | Stop reason: HOSPADM

## 2019-07-31 RX ADMIN — FENTANYL CITRATE 50 MCG: 50 INJECTION, SOLUTION INTRAMUSCULAR; INTRAVENOUS at 09:09

## 2019-07-31 RX ADMIN — PROPOFOL 10 MG: 10 INJECTION, EMULSION INTRAVENOUS at 09:19

## 2019-07-31 RX ADMIN — FENTANYL CITRATE 25 MCG: 50 INJECTION, SOLUTION INTRAMUSCULAR; INTRAVENOUS at 09:17

## 2019-07-31 RX ADMIN — DEXAMETHASONE SODIUM PHOSPHATE 5 MG: 4 INJECTION, SOLUTION INTRAMUSCULAR; INTRAVENOUS at 09:12

## 2019-07-31 RX ADMIN — KETOROLAC TROMETHAMINE 30 MG: 30 INJECTION, SOLUTION INTRAMUSCULAR at 09:50

## 2019-07-31 RX ADMIN — PROPOFOL 30 MG: 10 INJECTION, EMULSION INTRAVENOUS at 09:10

## 2019-07-31 RX ADMIN — ONDANSETRON 4 MG: 2 INJECTION INTRAMUSCULAR; INTRAVENOUS at 09:09

## 2019-07-31 RX ADMIN — SODIUM CHLORIDE, POTASSIUM CHLORIDE, SODIUM LACTATE AND CALCIUM CHLORIDE: 600; 310; 30; 20 INJECTION, SOLUTION INTRAVENOUS at 08:20

## 2019-07-31 RX ADMIN — PROPOFOL 200 MCG/KG/MIN: 10 INJECTION, EMULSION INTRAVENOUS at 09:20

## 2019-07-31 RX ADMIN — FENTANYL CITRATE 25 MCG: 50 INJECTION, SOLUTION INTRAMUSCULAR; INTRAVENOUS at 09:26

## 2019-07-31 RX ADMIN — LIDOCAINE HYDROCHLORIDE 60 MG: 20 INJECTION, SOLUTION INFILTRATION; PERINEURAL at 09:12

## 2019-07-31 RX ADMIN — PROPOFOL 30 MG: 10 INJECTION, EMULSION INTRAVENOUS at 09:13

## 2019-07-31 RX ADMIN — MIDAZOLAM 2 MG: 1 INJECTION INTRAMUSCULAR; INTRAVENOUS at 09:09

## 2019-07-31 RX ADMIN — FENTANYL CITRATE 25 MCG: 50 INJECTION INTRAMUSCULAR; INTRAVENOUS at 10:17

## 2019-07-31 RX ADMIN — PROPOFOL 30 MG: 10 INJECTION, EMULSION INTRAVENOUS at 09:16

## 2019-07-31 NOTE — ANESTHESIA CARE TRANSFER NOTE
Patient: Triny Bower    Procedure(s):  Diagnostic hysteroscopy, myosure myomectomy  Dilateand curretage cervix, hysteroscopy, ablate endometrium novasure, combined    Diagnosis: Excision of oral cavity growth  Diagnosis Additional Information: No value filed.    Anesthesia Type:   MAC     Note:  Airway :Room Air  Patient transferred to:Phase II  Handoff Report: Identifed the Patient, Identified the Reponsible Provider, Reviewed the pertinent medical history, Discussed the surgical course, Reviewed Intra-OP anesthesia mangement and issues during anesthesia, Set expectations for post-procedure period and Allowed opportunity for questions and acknowledgement of understanding      Vitals: (Last set prior to Anesthesia Care Transfer)    CRNA VITALS  7/31/2019 0927 - 7/31/2019 1003      7/31/2019             Pulse:  79    SpO2:  97 %                Electronically Signed By: BINU Canales CRNA  July 31, 2019  10:03 AM

## 2019-07-31 NOTE — OP NOTE
Operative Note  July 31, 2019     Pre-operative diagnosis:  1. AUB  2. Pelvic fullness  3. Suspected submucosal fibroid     Post-operative diagnosis:  1. Same as above  2. Cervical polyps  3. No submucosal fibroids    Surgeon:  Viral Hodges MD    Procedures:  1. Hysteroscopy  2. Polypectomy with myosure morcelator  3. Endometrial curetting  4. Novasure ablation     Anesthesia:  MAC    Distending media: normal saline  Deficit: 20 mL    EBL: 0 mL    Findings: normal genitalia, vagina, cervix, normal mobile smooth anteverted uterus with no adnexal mass on bimanual. Two cervical polyps seen. Normal endometrium and ostia bilaterally.     Complications: None    Specimens:   1. Cervical and endometrial curetting     Procedure:  Patient was taken to the OR where MAC anesthesia was undertaken.  Once patient was comfortable she was positioned in the dorsal lithotomy position with her legs up in the yellow fin stirrups.  She was then prepped and draped in the normal sterile fashion.  Straight catheterization of the bladder was done with clear urine returned.  A presurgical pause was undertaken and the proper patient and procedure were identified.  A sterile speculum was placed in the vagina and the cervix was visualized. A single toothed tenaculum was placed on the anterior lip.  20 cc of 0.25% marcaine with epi was injected into the 4 and 7 o'clock cervical vaginal junctions to obtain a paracervical block.  The cervix was then progressively dilated with hegar dilators to 7.  The hysteroscope was then passed easily through the cervical os and into the uterine cavity.  The uterine cavity was distended with normal saline. Two cervical polyps were visualized. The endometrium and both ostia was visualized and appeared normal.  The obturator of the myosure hysteroscope was removed and the myosure morcelator was inserted.  The base of the cervical polyps were identified and was easily removed with the myosure morcelator. The  uterine lining was then sampled with the myosure and curetting collected.  Tissue removed was sent to pathology.  The hysteroscope and morcelator were then removed.      The Novasure sound was then used to measure the cervical length and uterine cavity length.  The Novasure ablation device was inserted through the internal os and the plunger was applied.  The device was deployed, rotated 45 degrees to the left and right, and pulled back to allow full opening of the device.  The width of the uterine cavity was measured.  The system was tested and found to be suitable for ablation.  The ablation ran for 58 seconds at a power of 161.  The device was retracted and withdrawn from the uterine cavity, then re-deployed to reveal good remnant of tissue on the device.  The tenaculum was removed from the cervix and good hemostasis was noted.  The speculum was removed from the vagina.  The patient tolerated the procedure well and was taken to the recovery room in stable condition.  Instrument, needle, and sponge counts were correct x2.      Cervical length: 3  Uterine cavity length: 6.5  Cavity width: 4.5  Power: 161  Time: 58s    The patient tolerated the procedure well and was taken to the PACU for recovery.      Viral Hodges MD  July 31, 2019 10:59 AM

## 2019-07-31 NOTE — ANESTHESIA PREPROCEDURE EVALUATION
Anesthesia Pre-Procedure Evaluation    Patient: Triny Bower   MRN: [id number] : 1970          Preoperative Diagnosis: Excision of oral cavity growth    Procedure(s):  Diagnostic hysteroscopy, myosure myomectomy  dilation & curettage    Past Medical History:   Diagnosis Date     Displacement of lumbar intervertebral disc without myelopathy 01/20/10    Transfer to [facility]     Hyperlipidemia LDL goal <130 3/22/2018     Past Surgical History:   Procedure Laterality Date     BACK SURGERY       HEMILAMINECTOMY, DISCECTOMY LUMBAR TWO LEVELS, COMBINED  2012    Procedure: COMBINED HEMILAMINECTOMY, DISCECTOMY LUMBAR TWO LEVELS;  Discectomy right Lumbar 4-Sacral 1, Hemilaminectomy bilateral Lumbar 4-Sacral 1;  Surgeon: Shree Martinez MD;  Location: [facility] OR     LAMINECT/DISCECTOMY, LUMBAR  01/20/10    L4-5     TUBAL LIGATION         Anesthesia Evaluation     . Pt has had prior anesthetic. Type: General           ROS/MED HX    ENT/Pulmonary:  - neg pulmonary ROS     Neurologic:  - neg neurologic ROS     Cardiovascular:     (+) Dyslipidemia, ----. : . . . :. .       METS/Exercise Tolerance:     Hematologic:  - neg hematologic  ROS       Musculoskeletal:  - neg musculoskeletal ROS       GI/Hepatic:     (+) GERD Asymptomatic on medication,       Renal/Genitourinary:  - ROS Renal section negative       Endo:  - neg endo ROS       Psychiatric:     (+) psychiatric history depression      Infectious Disease:  - neg infectious disease ROS       Malignancy:      - no malignancy   Other:    - neg other ROS                      Physical Exam  Normal systems: cardiovascular, pulmonary and dental    Airway   Mallampati: II  TM distance: >3 FB  Neck ROM: full    Dental     Cardiovascular       Pulmonary             Lab Results   Component Value Date    WBC 7.2 2019    HGB 12.5 2019    HCT 37.6 2019     2019    SED 47 (H) 2010     2019    POTASSIUM 3.9 2019  "   CHLORIDE 105 07/12/2019    CO2 27 07/12/2019    BUN 12 07/12/2019    CR 0.92 07/12/2019    GLC 81 07/12/2019    KAMARI 8.7 07/12/2019    PHOS 3.1 01/21/2010    MAG 2.1 01/21/2010    ALBUMIN 3.4 07/12/2019    PROTTOTAL 6.8 07/12/2019    ALT 21 07/12/2019    AST 12 07/12/2019    ALKPHOS 43 07/12/2019    BILITOTAL 0.5 07/12/2019    INR 0.96 01/21/2010    TSH 2.48 07/12/2019    HCG Negative 08/08/2012       Preop Vitals  BP Readings from Last 3 Encounters:   07/25/19 108/70   07/23/19 102/62   07/11/19 106/78    Pulse Readings from Last 3 Encounters:   07/25/19 69   07/23/19 72   07/11/19 73      Resp Readings from Last 3 Encounters:   07/25/19 20   07/11/19 20   03/22/18 16    SpO2 Readings from Last 3 Encounters:   07/25/19 98%   07/11/19 100%   11/06/16 97%      Temp Readings from Last 1 Encounters:   07/25/19 97.6  F (36.4  C) (Temporal)    Ht Readings from Last 1 Encounters:   07/25/19 1.745 m (5' 8.7\")      Wt Readings from Last 1 Encounters:   07/25/19 103.9 kg (229 lb)    Estimated body mass index is 34.11 kg/m  as calculated from the following:    Height as of 7/25/19: 1.745 m (5' 8.7\").    Weight as of 7/25/19: 103.9 kg (229 lb).       Anesthesia Plan      History & Physical Review  History and physical reviewed and following examination; no interval change.    ASA Status:  2 .    NPO Status:  > 8 hours    Plan for MAC with Propofol induction. Maintenance will be TIVA.  Reason for MAC:  Difficulty with conscious sedation (QS)  PONV prophylaxis:  Ondansetron (or other 5HT-3) and Dexamethasone or Solumedrol       Postoperative Care  Postoperative pain management:  IV analgesics and Oral pain medications.      Consents  Anesthetic plan, risks, benefits and alternatives discussed with:  Patient.  Use of blood products discussed: No .   .                 BINU Canales CRNA  "

## 2019-07-31 NOTE — ANESTHESIA POSTPROCEDURE EVALUATION
Patient: Triny Bower    Procedure(s):  Diagnostic hysteroscopy, myosure myomectomy  Dilateand curretage cervix, hysteroscopy, ablate endometrium novasure, combined    Diagnosis:Excision of oral cavity growth  Diagnosis Additional Information: No value filed.    Anesthesia Type:  MAC    Note:  Anesthesia Post Evaluation    Patient location during evaluation: Phase 2  Patient participation: Able to fully participate in evaluation  Level of consciousness: awake and alert  Pain management: adequate  Airway patency: patent  Cardiovascular status: acceptable  Respiratory status: acceptable  Hydration status: acceptable  PONV: none             Last vitals:  Vitals:    07/31/19 1030 07/31/19 1045 07/31/19 1100   BP: 110/73 117/67 (!) 121/90   Pulse:  60 58   Resp:   16   Temp:      SpO2: 94% 98% 98%         Electronically Signed By: BINU Canales CRNA  July 31, 2019  1:17 PM

## 2019-07-31 NOTE — DISCHARGE INSTRUCTIONS
[facility]  Discharge Instruction Following Hysteroscopy D&C  Dr. Hodges  Specialty Clinic: [phone]      You must be on pelvic rest for 2 weeks, this consists of no tampons, no douching and no intercourse.     If you have unusually heavy vaginal bleeding, severe nausea with vomiting, or increased pain, or fever, please contact Dr. Hodges s RN Casi at [phone], or go to the emergency room if after hours.    Shower on post-op day 1. No baths/pools/hot tubs/lakes for  2 weeks.     When to contact your surgeon:  -Nausea/Vomiting  -Increasing intolerable pain  -Anything other you feel important.         [facility]   Adult Discharge Orders & Instructions     For 24 hours after surgery    1. Get plenty of rest.  A responsible adult must stay with you for at least 24 hours after you leave the hospital.   2. Do not drive or use heavy equipment.  If you have weakness or tingling, don't drive or use heavy equipment until this feeling goes away.  3. Do not drink alcohol.  4. Avoid strenuous or risky activities.  Ask for help when climbing stairs.   5. You may feel lightheaded.  If so, sit for a few minutes before standing.  Have someone help you get up.   6. You may have a slight fever. Call the doctor if your fever is over 100 F (37.7 C) (taken under the tongue) or lasts longer than 24 hours.  7. You may have a dry mouth, a sore throat, muscle aches or trouble sleeping.  These should go away after 24 hours.  8. Do not make important or legal decisions.  We don t expect you to have any problems from the surgery or treatment you had today. Just in case, here s what to do if you have pain, upset stomach (nausea), bleeding or infection:  Pain:  Take medicines your doctor has prescribed or over-the-counter medicine they have suggested. Resting and using ice packs can help, too. For surgery on an arm or leg, raise it on a pillow to ease swelling. Call your doctor if these methods  don t work.  Copyright [employer], Licensed under CC4.0 [employer]  Upset stomach (nausea):  Take anti-nausea medicine approved by your doctor. Drink clear liquids like apple juice, ginger ale, broth or 7-Up. Be sure to drink enough fluids. Rest can help, too. Move to normal foods when you re ready. Bleeding:  In the first 24 hours, you may see a little blood on your dressing, about the size of a quarter. You don t need to worry about this much blood, but if the blood spot keeps getting bigger:    Put pressure on the wound if you can, AND    Call your doctor.  Copyright [employer], Licensed under CC4.0 [employer]  Fever/Infection: Please call your doctor if you have any of these signs:    Redness    Swelling    Wound feels warm    Pain gets worse    Bad-smelling fluid leaks from wound    Fever or chills  Call your doctor for any of the followin.  It has been over 8 to 10 hours since surgery and you are still not able to urinate (pass water).    2.  Headache for over 24 hours.    3.  Numbness, tingling or weakness in your legs the day after surgery (if you had spinal anesthesia).    Nurse advice line: [phone]

## 2019-08-02 NOTE — OR NURSING
[facility]  Discharge Call Back  Triny Bower  1970  MRN: [id number]  Home: [phone] (home)   PCP: Otis Samaniego    We are calling to see how you're doing since your surgery/procedure with us?   Comments: doing okay   Clinical Questions  1. Have you had time to look at your discharge instructions? Do you have any questions in regards to the instructions?   Comment: yes no  2. Do you feel your pain is being controlled with the regimen the surgeon sent you home on? (ie: prescription medications, over the counter pain medications, ice packs)   Comments: yes   3. Have you noticed any drainage on your dressing? Do you know what to do if you have bleeding as a result of your procedure?   Comments: no yes   4. Have you had any nausea/vomiting? Do you know how to treat this?   Comment: no yes   5. Have you had any signs/symptoms of infection? (ie: fever, swelling, heat, drainage or redness) Do you know what to do if you have?   Comment: no yes   6. Do you have a follow up appointment made with your surgeon? Do you have a number to contact them at if you need it?   Comment: yes   Retained Foreign Object (HILDA, Hemovac, Penrose, Wound Packing, Vaginal Packing, Nasal Splints, Urethral Stents, Malcolm Catheter)  1. Do you still have NA in place?   2. If the item is still in place, can you review the plan for removal with me? NA      You may be randomly selected to fill out a [facility] survey. We would appreciate you taking the time to fill this out. It is important to us if you would answer all of the questions on the survey.

## 2019-08-04 LAB — COPATH REPORT: NORMAL

## 2019-08-20 ENCOUNTER — OFFICE VISIT (OUTPATIENT)
Dept: OBGYN | Facility: CLINIC | Age: 49
End: 2019-08-20
Payer: COMMERCIAL

## 2019-08-20 VITALS
DIASTOLIC BLOOD PRESSURE: 68 MMHG | BODY MASS INDEX: 34.89 KG/M2 | HEART RATE: 72 BPM | WEIGHT: 234.2 LBS | SYSTOLIC BLOOD PRESSURE: 108 MMHG

## 2019-08-20 DIAGNOSIS — Z98.890 POSTOPERATIVE STATE: Primary | ICD-10-CM

## 2019-08-20 PROCEDURE — 99024 POSTOP FOLLOW-UP VISIT: CPT | Performed by: OBSTETRICS & GYNECOLOGY

## 2019-08-20 NOTE — PROGRESS NOTES
Clinic Note    HPI:  Triny Bower is a 49 year old  who is postop s/p Hysteroscopy, Polypectomy with myosure morcelator, Endometrial curetting, and Novasure ablation on  for AUB, pelvic fullness, and endometrial polyps. She is doing well, some very thin pink tinged discharge. No other vaginal symptoms, no pelvic pain, fullness is significantly improved.     EXAM:  Vitals:    19 0912   BP: 108/68   BP Location: Right arm   Cuff Size: Adult Regular   Pulse: 72   Weight: 106.2 kg (234 lb 3.2 oz)    Body mass index is 34.89 kg/m .    Gen: Alert, oriented, appropriately interactive, NAD  Remainder of exam deferred     Labs & Imaging:  SPECIMEN(S):   Endocervical and endometrial curettings     FINAL DIAGNOSIS:   Endocervical polyps, resected with myosure morcellator, and endometrial   curettings:   - Several fragments of benign endocervical tissue, some polyp-like.   - Multiple fragments of benign nonphasic endometrium, negative for   hyperplasia, atypia and malignancy.   - A small amount of benign mature smooth muscle tissue, consistent with   myometrium.   - A small amount of normal mature squamous epithelium.     ASSESSMENT/PLAN: Triny Bower is a 49 year old  who is postop s/p Hysteroscopy, Polypectomy with myosure morcelator, Endometrial curetting, and Novasure ablation on  for AUB, pelvic fullness, and endometrial polyps. She is doing well.    1. Postoperative state  - Recovered well, no further postop restrictions  - Has mammogram to reschedule  - Recommending return for annual exams, sooner with any concerns.     Viral Hodges MD  2019 9:45 AM

## 2020-03-01 ENCOUNTER — HEALTH MAINTENANCE LETTER (OUTPATIENT)
Age: 50
End: 2020-03-01

## 2020-04-01 ENCOUNTER — NURSE TRIAGE (OUTPATIENT)
Dept: NURSING | Facility: CLINIC | Age: 50
End: 2020-04-01

## 2020-04-01 ENCOUNTER — VIRTUAL VISIT (OUTPATIENT)
Dept: FAMILY MEDICINE | Facility: OTHER | Age: 50
End: 2020-04-01

## 2020-04-01 NOTE — PROGRESS NOTES
"Date: 2020 12:35:44  Clinician: Emily Cheng  Clinician NPI: [id number]  Patient: Triny Bower  Patient : 1970  Patient Address: [address] [place] [address]  Patient Phone: [phone]  Visit Protocol: URI  Patient Summary:  Triny is a 49 year old ( : 1970 ) female who initiated a Visit for COVID-19 (Coronavirus) evaluation and screening. When asked the question \"Please sign me up to receive news, health information and promotions. \", Triny responded \"No\".    Triny states her symptoms started gradually 3-6 days ago. After her symptoms started, they improved and then got worse again.   Her symptoms consist of a headache, myalgia, a cough, and malaise. She is experiencing mild difficulty breathing with activities but can speak normally in full sentences. Triny also feels feverish but was unable to measure her temperature.   Symptom details     Cough: Triny coughs a few times an hour and her cough is not more bothersome at night. Phlegm does not come into her throat when she coughs. She does not believe her cough is caused by post-nasal drip.     Headache: She states the headache is moderate (4-6 on a 10 point pain scale).      Triny denies having teeth pain, facial pain or pressure, chills, wheezing, sore throat, nasal congestion, ear pain, enlarged lymph nodes, and rhinitis. She also denies having a sinus infection within the past year, taking antibiotic medication for the symptoms, and having recent facial or sinus surgery in the past 60 days.   Precipitating events  She has not recently been exposed to someone with influenza. Triny has been in close contact with the following high risk individuals: pregnant women, adults 65 or older, and people with asthma, heart disease or diabetes.   Pertinent COVID-19 (Coronavirus) information  Triny has not traveled internationally or to the areas where COVID-19 (Coronavirus) is widespread, including cruise ship travel in the last 14 days before " the start of her symptoms.   Triny has not had a close contact with a laboratory-confirmed COVID-19 patient within 14 days of symptom onset. She also has not had a close contact with a suspected COVID-19 patient within 14 days of symptom onset.   Triny is not a healthcare worker or a  and does not work in a healthcare facility. She does not live with a healthcare worker.   Pertinent medical history  Triny does not get yeast infections when she takes antibiotics.   Triny does not need a return to work/school note.   Weight: 220 lbs   Triny does not smoke or use smokeless tobacco.   She denies pregnancy and denies breastfeeding. She does not menstruate.   Additional information as reported by the patient (free text): I have tightness in my chest with a burning type feeling. I work at [employer] so I am exposed to hundreds of people everyday.   Weight: 220 lbs    MEDICATIONS: No current medications, ALLERGIES: NKDA  Clinician Response:  Dear Triny,   Based on the information you have provided, you do have symptoms that are consistent with Coronavirus (COVID-19).  The coronavirus causes mild to severe respiratory illness with the most common symptoms including fever, cough and difficulty breathing. Unfortunately, many viruses cause similar symptoms and it can be difficult to distinguish between viruses, especially in mild cases, so we are presuming that anyone with cough or fever has coronavirus at this time.  Coronavirus/COVID-19 has reached the point of community spread in [place], meaning that we are finding the virus in people with no known exposure risk for guerline the virus. Given the increasing commonness of coronavirus in the community we are no longer testing patients who are not critically ill.  If you are a health care worker, you should refer to your employee health office for instructions about testing and returning to work.  For everyone else who has cough or fever, you should assume you  are infected with coronavirus. Since you will not be tested but have symptoms that may be consistent with coronavirus, the CDC recommends you stay in self-isolation until these three things have happened:    You have had no fever for at least 72 hours (that is three full days of no fever without the use of medicine that reduces fevers)    AND   Other symptoms have improved (for example, when your cough or shortness of breath have improved)   AND   At least 7 days have passed since your symptoms first appeared.   How to Isolate:   Isolate yourself at home.  Do Not allow any visitors  Do Not go to work or school  Do Not go to Rastafari,  centers, shopping, or other public places.  Do Not shake hands.  Avoid close contact with others (hugging, kissing).   Protect Others:   Cover Your Mouth and Nose with a mask, disposable tissue or wash cloth to avoid spreading germs to others.  Wash your hands and face frequently with soap and water.   We know it can be scary to hear that you might have COVID-19. Our team can help track your symptoms and make sure you are doing ok over the next two weeks using a program called [employer] to keep in touch. When you receive an email from [employer], please consider enrolling in our monitoring program. There is no cost to you for monitoring. Here is a URL where you can learn more: [email or web address]  Managing Symptoms:   At this time, we primarily recommend Tylenol (Acetaminophen) for fever or pain. If you have liver or kidney problems, contact your primary care provider for instructions on use of tylenol. Adults can take 650 mg (two 325 mg pills) by mouth every 4-6 hours as needed OR 1,000 mg (two 500 mg pills) every 8 hours as needed. MAXIMUM DAILY DOSE: 3,000mg. For children, refer to dosing on bottle based on age or weight.   If you develop significant shortness of breath that prevents you from doing normal activities, please call 911 or proceed to the  nearest emergency room and alert them immediately that you have been in self-isolation for possible coronavirus.  If you have a higher risk medical condition such as cancer, heart failure, end stage renal disease on dialysis or have a transplant, please reach out to your specialist's clinic to advise them of your [facility] visit should you not improve within the next two days.   For more information about COVID19 and options for caring for yourself at home, please visit the CDC website at https://www.cdc.gov/coronavirus/2019-ncov/about/steps-when-sick.htmlFor more options for care at [facility], please visit our website at [email or web address]    Diagnosis: Coronavirus infection, unspecified  Diagnosis ICD: B34.2

## 2020-04-01 NOTE — TELEPHONE ENCOUNTER
She is a [employer] employee. She had a virtual visit today.  She is suspected to have COVID-19 so has been told to self isolate. Her employer will not pay her unless she can get tested and the test is +.  Since she is not a health care worker or in the hospital she cannot be tested.      Plan: She was given the [place] Dept of Health Phone #.  She was advised to have her employer call them to verify that this is the case. She was told to print out her oncare/virtual visit plan that states that she cannot be tested but is to self isoloate for suspected COVID-19.  This is legally binding.   She said she would do this.    COVID 19 Nurse Triage Plan    Please be aware that novel coronavirus (COVID-19) may be circulating in the community. If you develop symptoms such as fever, cough, or SOB or if you have concerns about the presence of another infection including coronavirus (COVID-19), please contact your health care provider or visit [email or web address].     Patient HAS known exposure, fever, cough or SOB in addition to reason for call.   Patient advised to stay home with mild symptoms and follow home care symptom management.     Instructions Given to Patient  Your symptoms could be due to COVID-19, but it also could be due to a number of other respiratory illnesses.  We are not doing testing at this time for COVID-19 unless symptoms are severe enough to require hospitalization.  It is recommended that you stay home to prevent the spread of your illness.  To do this follow these instructions:    Regardless of if you have been tested or not:  Patient who have symptoms (cough, fever, or shortness of breath), need to isolate for 7 days from when symptoms started AND 72 hours after fever resolves (without fever reducing medications) AND improvement of respiratory symptoms (whichever is longer).      Isolate yourself at home (in own room/own bathroom if possible)    Do Not allow any visitors    Do Not go to work or school    Do Not go  to Buddhist,  centers, shopping, or other public places.    Do Not shake hands.    Avoid close and intimate contact with others (hugging, kissing).    Follow CDC recommendations for household cleaning of frequently touched services.     After the initial 7 days, continue to isolate yourself from household members as much as possible. To continue decrease the risk of community spread and exposure, you and any members of your household should limit activities in public for 14 days after starting home isolation.     You can reference the following CDC link for helpful home isolation/care tips:  https://www.cdc.gov/coronavirus/2019-ncov/downloads/10Things.pdf    Protect Others:    Cover your mouth and nose with a mask, disposable tissue or wash cloth to avoid spreading germs to others.    Wash your hands and face frequently with soap and water.    Fever Medicines:    For fever relief, take acetaminophen or ibuprofen.    Treat fevers above 101  F (38.3  C) to lower fevers and make you more comfortable.     Acetaminophen (e.g., Tylenol): Take 650 mg (two 325 mg pills) by mouth every 4-6 hours as needed of regular strength Tylenol or 1,000 mg (two 500 mg pills) every 8 hours as needed of Extra Strength Tylenol.     Ibuprofen (e.g., Motrin, Advil): Take 400 mg (two 200 mg pills) by mouth every 6 hours as needed.     Acetaminophen is thought to be safer than ibuprofen or naproxen for people over 65 years old. Acetaminophen is in many OTC and prescription medicines. It might be in more than one medicine that you are taking. You need to be careful and not take an overdose. Before taking any medicine, read all the instructions on the package.    Caution - NSAIDs (e.g., ibuprofen, naproxen): Do not take nonsteroidal anti-inflammatory drugs (NSAIDs) if you have stomach problems, kidney disease, heart failure, or other contraindications to using this type of medicine. Do not take NSAID medicines for over 7 days without  consulting your PCP. Do not take NSAID medicines if you are pregnant. Do not take NSAID medicines if you are also taking blood thinners.     Call Back If: Breathing difficulty develops or you become worse.    Thank you for limiting contact with others, wearing a simple mask to cover your cough, practice good hand hygiene habits and accessing our virtual services where possible to limit the spread of this virus.    For more information about COVID19 and options for caring for yourself at home, please visit the CDC website at https://www.cdc.gov/coronavirus/2019-ncov/about/steps-when-sick.html  For more options for care at [facility], please visit our website at [email or web address]        Additional Information    Negative: Nursing judgment    Negative: Nursing judgment    Negative: Nursing judgment    Negative: Nursing judgment    Information only question and nurse able to answer    Protocols used: NO PROTOCOL AVAILABLE - INFORMATION ONLY-A-[place]

## 2020-06-12 ENCOUNTER — VIRTUAL VISIT (OUTPATIENT)
Dept: FAMILY MEDICINE | Facility: CLINIC | Age: 50
End: 2020-06-12

## 2020-06-12 DIAGNOSIS — R05.9 COUGH: Primary | ICD-10-CM

## 2020-06-12 PROCEDURE — 99212 OFFICE O/P EST SF 10 MIN: CPT | Mod: 95 | Performed by: NURSE PRACTITIONER

## 2020-06-12 NOTE — NURSING NOTE
Health Maintenance Due   Topic Date Due     HIV SCREENING  05/13/1986     PHQ-9  09/22/2018     PREVENTIVE CARE VISIT  07/11/2020      Graciela Hassan LPN........6/12/2020 1:28 PM

## 2020-06-12 NOTE — PROGRESS NOTES
"Triny Bower is a 50 year old female who is being evaluated via a billable video visit.      The patient has been notified of following:     \"This video visit will be conducted via a call between you and your physician/provider. We have found that certain health care needs can be provided without the need for an in-person physical exam.  This service lets us provide the care you need with a video conversation.  If a prescription is necessary we can send it directly to your pharmacy.  If lab work is needed we can place an order for that and you can then stop by our lab to have the test done at a later time.    Video visits are billed at different rates depending on your insurance coverage.  Please reach out to your insurance provider with any questions.    If during the course of the call the physician/provider feels a video visit is not appropriate, you will not be charged for this service.\"    Patient has given verbal consent for Video visit? Yes    Will anyone else be joining your video visit? No    Subjective     Triny Bower is a 50 year old female who presents today via video visit for the following health issues:  Chief Complaint   Patient presents with     Shortness of Breath     ONLY WHEN WEARING A MASK      Cough     Headache      HPI  Acute Illness   Acute illness concerns: Cough, migraine and shortness of breath when wearing a a mask and a little while after.   Onset: May     Fever: no    Chills/Sweats: no    Headache (location?): YES    Sinus Pressure:no    Conjunctivitis:  no    Ear Pain: no    Rhinorrhea: no    Congestion: no    Sore Throat: no     Cough: YES-non-productive    Wheeze: no    Decreased Appetite: no But but does have some GI upset when wearing a mask    Nausea: YES    Vomiting: no    Diarrhea:  no    Dysuria/Freq.: no    Fatigue/Achiness: YES but chronically fatigued    Sick/Strep Exposure: no but works at [employer]      Therapies Tried and outcome: Taking mask off during breaks. " "    She reports a cough, dyspnea and headaches while wearing a mask at work.  Works at [employer] and masks are required at all times while at work.  Symptoms completely resolve once she removes her mask and when she is at home.  No fevers/chill.  She does not feel ill.  No previous lung issues.  She has asked to not wear the mask at work, but was told she needs paperwork from a medical provider to allow this.  No other concern today. No symptoms currently.     Video Start Time: 1348        Reviewed and updated as needed this visit by Provider      Review of Systems   Constitutional, HEENT, cardiovascular, pulmonary, gi and gu systems are negative, except as otherwise noted.      Objective    There were no vitals taken for this visit.  Estimated body mass index is 34.89 kg/m  as calculated from the following:    Height as of 7/25/19: 1.745 m (5' 8.7\").    Weight as of 8/20/19: 106.2 kg (234 lb 3.2 oz).  Physical Exam     GENERAL: Healthy, alert and no distress  EYES: Eyes grossly normal to inspection.  No discharge or erythema, or obvious scleral/conjunctival abnormalities.  RESP: No audible wheeze, cough, or visible cyanosis.  No visible retractions or increased work of breathing.    SKIN: Visible skin clear. No significant rash, abnormal pigmentation or lesions.  NEURO: Cranial nerves grossly intact.  Mentation and speech appropriate for age.  PSYCH: Mentation appears normal, affect normal/bright, judgement and insight intact, normal speech and appearance well-groomed.      Diagnostic Test Results:  none         Assessment & Plan     1. Cough  She only has symptoms when she has to wear a mask at work.  No symptoms at home and when she takes the mask off her symptoms resolve.  She needs paperwork filled out to allow her to continue to work without wearing a mask. This paperwork has been faxed per her report, but we are unable to find this in clinic. Nursing staff is going to reach out to see if they can locate this " "paperwork and I will fill this out.        BMI:   Estimated body mass index is 34.89 kg/m  as calculated from the following:    Height as of 7/25/19: 1.745 m (5' 8.7\").    Weight as of 8/20/19: 106.2 kg (234 lb 3.2 oz).   Weight management plan: Patient was referred to their PCP to discuss a diet and exercise plan.          No follow-ups on file.    BINU Mckeon CNP  [facility]      Video-Visit Details    Type of service:  Video Visit    Video End Time:1359    Originating Location (pt. Location): Home    Distant Location (provider location):  [facility]     Platform used for Video Visit: [place]    No follow-ups on file.       BINU Mckeon CNP        "

## 2020-06-16 ENCOUNTER — MYC MEDICAL ADVICE (OUTPATIENT)
Dept: FAMILY MEDICINE | Facility: CLINIC | Age: 50
End: 2020-06-16

## 2020-07-10 DIAGNOSIS — Z11.59 SCREENING FOR VIRAL DISEASE: ICD-10-CM

## 2020-07-17 ENCOUNTER — MYC MEDICAL ADVICE (OUTPATIENT)
Dept: FAMILY MEDICINE | Facility: CLINIC | Age: 50
End: 2020-07-17

## 2020-07-20 NOTE — TELEPHONE ENCOUNTER
Do I need to do anything?  Or can we just send this to her?    Electronically signed by Shannon Mckeon CNP.

## 2020-12-13 ENCOUNTER — HEALTH MAINTENANCE LETTER (OUTPATIENT)
Age: 50
End: 2020-12-13

## 2021-02-21 ENCOUNTER — HEALTH MAINTENANCE LETTER (OUTPATIENT)
Age: 51
End: 2021-02-21

## 2021-03-14 ENCOUNTER — HOSPITAL ENCOUNTER (EMERGENCY)
Facility: CLINIC | Age: 51
Discharge: HOME | End: 2021-03-14
Attending: EMERGENCY MEDICINE | Admitting: EMERGENCY MEDICINE
Payer: COMMERCIAL

## 2021-03-14 ENCOUNTER — APPOINTMENT (OUTPATIENT)
Dept: GENERAL RADIOLOGY | Facility: CLINIC | Age: 51
End: 2021-03-14
Attending: EMERGENCY MEDICINE
Payer: COMMERCIAL

## 2021-03-14 VITALS
TEMPERATURE: 97.3 F | OXYGEN SATURATION: 98 % | HEART RATE: 67 BPM | SYSTOLIC BLOOD PRESSURE: 119 MMHG | DIASTOLIC BLOOD PRESSURE: 82 MMHG | RESPIRATION RATE: 16 BRPM

## 2021-03-14 DIAGNOSIS — S93.401A SPRAIN OF RIGHT ANKLE, UNSPECIFIED LIGAMENT, INITIAL ENCOUNTER: ICD-10-CM

## 2021-03-14 PROCEDURE — 99284 EMERGENCY DEPT VISIT MOD MDM: CPT | Performed by: EMERGENCY MEDICINE

## 2021-03-14 PROCEDURE — 73610 X-RAY EXAM OF ANKLE: CPT | Mod: RT

## 2021-03-14 PROCEDURE — 99283 EMERGENCY DEPT VISIT LOW MDM: CPT | Performed by: EMERGENCY MEDICINE

## 2021-03-14 RX ORDER — ACETAMINOPHEN 500 MG/1
1000 CAPSULE, LIQUID FILLED ORAL
COMMUNITY
End: 2021-07-08

## 2021-03-14 ASSESSMENT — ENCOUNTER SYMPTOMS
HEMATOLOGIC/LYMPHATIC NEGATIVE: 1
JOINT SWELLING: 1
ARTHRALGIAS: 1
PSYCHIATRIC NEGATIVE: 1
ALLERGIC/IMMUNOLOGIC NEGATIVE: 1
NUMBNESS: 1
RESPIRATORY NEGATIVE: 1
CARDIOVASCULAR NEGATIVE: 1
ENDOCRINE NEGATIVE: 1
EYES NEGATIVE: 1
CONSTITUTIONAL NEGATIVE: 1
GASTROINTESTINAL NEGATIVE: 1

## 2021-03-14 NOTE — ED TRIAGE NOTES
Pt reports rolling her ankle yesterday while trying to kennel some dogs, she has pain and swelling of the right ankle

## 2021-07-03 NOTE — PROGRESS NOTES
SUBJECTIVE:   CC: Triny Bower is an 51 year old woman who presents for preventive health visit.     Patient has been advised of split billing requirements and indicates understanding: Yes     Healthy Habits:     Getting at least 3 servings of Calcium per day:  NO    Bi-annual eye exam:  NO    Dental care twice a year:  NO    Sleep apnea or symptoms of sleep apnea:  None    Diet:  Regular (no restrictions)    Frequency of exercise:  None    Taking medications regularly:  No    Barriers to taking medications:  Problems remembering to take them    Medication side effects:  None    PHQ-2 Total Score: 0    Additional concerns today:  Yes    - 2019 had cervical ablation, has not been evaluated since but reports increased abdominal pressure   - Due for mammogram and colonoscopy   - Returning to work soon, stressed  - Wearing masks cause SOB, headaches. On leave from work because of this     Hx of headaches, very sensitive to pressure changes     Hx of SOB that started after repeated episodes of bronchitis     - Heartburn when eating high salt/high sugar foods      Takes omeprazole intermittently, helpful  - Constipation seems cyclical, occurs every 2-3 weeks     OTC laxative or Milk of magnesia helpful    - Does not have dental/vision insurance right now but will follow up with dental and optometry once re-enrolled      Today's PHQ-2 Score:   PHQ-2 ( 1999 Pfizer) 7/11/2019   Q1: Little interest or pleasure in doing things 0   Q2: Feeling down, depressed or hopeless 0   PHQ-2 Score 0   Q1: Little interest or pleasure in doing things Not at all   Q2: Feeling down, depressed or hopeless Not at all   PHQ-2 Score 0       Abuse: Current or Past (Physical, Sexual or Emotional) - No  Do you feel safe in your environment? Yes    Have you ever done Advance Care Planning? (For example, a Health Directive, POLST, or a discussion with a medical provider or your loved ones about your wishes): No, advance care planning information  given to patient to review.  Patient declined advance care planning discussion at this time.    Social History     Tobacco Use     Smoking status: Never Smoker     Smokeless tobacco: Never Used   Substance Use Topics     Alcohol use: Not Currently     Comment: Very Rare     If you drink alcohol do you typically have >3 drinks per day or >7 drinks per week? No    Alcohol Use 7/11/2019   Prescreen: >3 drinks/day or >7 drinks/week? No   Prescreen: >3 drinks/day or >7 drinks/week? -       Reviewed orders with patient.  Reviewed health maintenance and updated orders accordingly - Yes  Lab work is in process  Labs reviewed in EPIC  BP Readings from Last 3 Encounters:   07/08/21 110/66   03/14/21 119/82   08/20/19 108/68    Wt Readings from Last 3 Encounters:   07/08/21 112 kg (247 lb)   08/20/19 106.2 kg (234 lb 3.2 oz)   07/25/19 103.9 kg (229 lb)                    Breast Cancer Screening:  Any new diagnosis of family breast, ovarian, or bowel cancer? Yes     FHS-7:   Breast CA Risk Assessment (FHS-7) 7/6/2021   Did any of your first-degree relatives have breast or ovarian cancer? Yes   Did any of your relatives have bilateral breast cancer? No   Did any man in your family have breast cancer? No   Did any woman in your family have breast and ovarian cancer? Yes   Did any woman in your family have breast cancer before age 50 y? No   Do you have 2 or more relatives with breast and/or ovarian cancer? No   Do you have 2 or more relatives with breast and/or bowel cancer? No       Mammogram Screening: Recommended annual mammography  Pertinent mammograms are reviewed under the imaging tab.    History of abnormal Pap smear: NO - age 30-65 PAP every 5 years with negative HPV co-testing recommended  PAP / HPV Latest Ref Rng & Units 7/11/2019 10/4/2012   PAP - NIL NIL   HPV 16 DNA NEG:Negative Negative -   HPV 18 DNA NEG:Negative Negative -   OTHER HR HPV NEG:Negative Negative -     Reviewed and updated as needed this visit by  clinical staff  Tobacco  Allergies  Meds  Problems  Med Hx  Surg Hx  Fam Hx  Soc Hx          Reviewed and updated as needed this visit by Provider  Tobacco  Allergies  Meds  Problems  Med Hx  Surg Hx  Fam Hx           Past Medical History:   Diagnosis Date     Displacement of lumbar intervertebral disc without myelopathy 01/20/10    [facility]     Hyperlipidemia LDL goal <130 3/22/2018      Past Surgical History:   Procedure Laterality Date     BACK SURGERY       DILATE CERVIX, HYSTEROSCOPY, ABLATE ENDOMETRIUM NOVASURE, COMBINED N/A 7/31/2019    Procedure: Dilateand curretage cervix, hysteroscopy, ablate endometrium novasure, combined;  Surgeon: Viral Hodges MD;  Location: [facility]     HEMILAMINECTOMY, DISCECTOMY LUMBAR TWO LEVELS, COMBINED  8/22/2012    Procedure: COMBINED HEMILAMINECTOMY, DISCECTOMY LUMBAR TWO LEVELS;  Discectomy right Lumbar 4-Sacral 1, Hemilaminectomy bilateral Lumbar 4-Sacral 1;  Surgeon: Shree Martinez MD;  Location: [facility]     LAMINECT/DISCECTOMY, LUMBAR  01/20/10    L4-5     OPERATIVE HYSTEROSCOPY WITH MORCELLATOR N/A 7/31/2019    Procedure: Diagnostic hysteroscopy, myosure myomectomy;  Surgeon: Viral Hodges MD;  Location: [facility]     TUBAL LIGATION  1995       Review of Systems   Constitutional: Negative for chills and fever.   HENT: Negative for congestion, ear pain, hearing loss and sore throat.    Eyes: Positive for visual disturbance. Negative for pain.   Respiratory: Negative for cough and shortness of breath.    Cardiovascular: Negative for chest pain, palpitations and peripheral edema.   Gastrointestinal: Positive for constipation and heartburn. Negative for abdominal pain, diarrhea, hematochezia and nausea.   Breasts:  Negative for tenderness, breast mass and discharge.   Genitourinary: Negative for dysuria, frequency, genital sores, hematuria, pelvic pain, urgency, vaginal bleeding and vaginal discharge.   Musculoskeletal: Positive  "for joint swelling. Negative for arthralgias and myalgias.   Skin: Negative for rash.   Neurological: Negative for dizziness, weakness, headaches and paresthesias.   Psychiatric/Behavioral: Negative for mood changes. The patient is not nervous/anxious.           OBJECTIVE:   /66   Pulse 67   Temp 97.8  F (36.6  C) (Temporal)   Resp 16   Ht 1.776 m (5' 9.92\")   Wt 112 kg (247 lb)   SpO2 99%   BMI 35.52 kg/m    Physical Exam  GENERAL: healthy, alert and no distress  EYES: Eyes grossly normal to inspection, PERRL and conjunctivae and sclerae normal  HENT: ear canals and TM's normal, nose and mouth without ulcers or lesions  NECK: no adenopathy, no asymmetry, masses, or scars and thyroid normal to palpation  RESP: lungs clear to auscultation - no rales, rhonchi or wheezes  BREAST: normal without masses, tenderness or nipple discharge and no palpable axillary masses or adenopathy  CV: regular rate and rhythm, normal S1 S2, no S3 or S4, no murmur, click or rub, no peripheral edema and peripheral pulses strong  ABDOMEN: soft, nontender, no hepatosplenomegaly, no masses and bowel sounds normal  MS: no gross musculoskeletal defects noted, no edema  SKIN: no suspicious lesions or rashes  NEURO: Normal strength and tone, mentation intact and speech normal  PSYCH: mentation appears normal, affect normal/bright    Pelvic - not indicated     Diagnostic Test Results:  Labs reviewed in Epic  See orders pending in Epic     ASSESSMENT/PLAN:       ICD-10-CM    1. Encounter for screening mammogram for breast cancer  Z12.31 MA SCREENING DIGITAL BILAT - Future  (s+30)   2. Routine general medical examination at a health care facility  Z00.00    3. Screen for colon cancer  Z12.11    4. Screening for HIV (human immunodeficiency virus)  Z11.4    5. Need for hepatitis C screening test  Z11.59    6. Morbid obesity (H)  E66.01    7. Endometrial polyp  N84.0 US Pelvic Complete with Transvaginal   8. Abdominal pressure  R10.9 US " "Pelvic Complete with Transvaginal   9. Screening for lipoid disorders  Z13.220 Lipid panel reflex to direct LDL Fasting   10. Screening for diabetes mellitus  Z13.1 Glucose   11. Cold extremities  R68.89 TSH with free T4 reflex       Routine Health Maintenance  - Order routine mammogram and colonoscopy, patient to schedule  - Elevated total cholesterol and LDL in 2019. Repeat lipid panel ordered today; results pending  - Glucose ordered for routine diabetes screening; results pending  - TSH drawn in 2019 was on the high end of normal and patient reports cold extremities. TSH ordered; results pending      Abdominal pressure and hx of endometrial polyps  - Hx of cervical ablation in 2019 and endometrial polyps  - Reports increased lower abdominal pressure  - Order transvaginal US for evaluation of prior cervical ablation and new onset abdominal pressure  - Will likely need follow up with GYN provider       Patient has been advised of split billing requirements and indicates understanding: Yes  COUNSELING:  Reviewed preventive health counseling, as reflected in patient instructions  Special attention given to:        Regular exercise       Healthy diet/nutrition       Colon cancer screening       (Ruth)menopause management    Estimated body mass index is 35.52 kg/m  as calculated from the following:    Height as of this encounter: 1.776 m (5' 9.92\").    Weight as of this encounter: 112 kg (247 lb).  Weight management plan: Discussed healthy diet and exercise guidelines  - Discussed exercise and diet   - Discussed if starts doing this consistently and weight starts to go down, could return to clinic to discuss medication     She reports that she has never smoked. She has never used smokeless tobacco.      Counseling Resources:  ATP IV Guidelines  Pooled Cohorts Equation Calculator  Breast Cancer Risk Calculator  BRCA-Related Cancer Risk Assessment: FHS-7 Tool  FRAX Risk Assessment  ICSI Preventive Guidelines  Dietary " Guidelines for Americans, 2010  USDA's MyPlate  ASA Prophylaxis  Lung CA Screening      Review of the result(s) of each unique test - See list       7/12/19 - Lipid, Tsh, CMP      7/11/19 - PAP   Diagnosis or treatment significantly limited by social determinants of health - none    40 minutes spent on the date of the encounter doing chart review, history and exam, documentation and further activities as noted above  In addition to the preventive visit, 15 minutes of the appointment were spent evaluating and developing a treatment plan for her additional concern(s).       The patient indicates understanding of these issues and agrees with the plan.    I, SARBJIT HewtitC,  was present with the PA student who participated in the service and in the documentation of the note.  I have verified the history and personally performed the physical exam and medical decision making.  I agree with the assessment and plan of care as documented in the note.     AHSAN Hurst-S2  [employer]     SARBJIT PerkinsC  [facility]

## 2021-07-06 ASSESSMENT — ENCOUNTER SYMPTOMS
SHORTNESS OF BREATH: 0
HEARTBURN: 1
FEVER: 0
JOINT SWELLING: 1
EYE PAIN: 0
DIARRHEA: 0
DIZZINESS: 0
NAUSEA: 0
HEADACHES: 0
WEAKNESS: 0
ABDOMINAL PAIN: 0
BREAST MASS: 0
MYALGIAS: 0
FREQUENCY: 0
DYSURIA: 0
CHILLS: 0
SORE THROAT: 0
COUGH: 0
HEMATOCHEZIA: 0
NERVOUS/ANXIOUS: 0
HEMATURIA: 0
PALPITATIONS: 0
CONSTIPATION: 1
ARTHRALGIAS: 0
PARESTHESIAS: 0

## 2021-07-07 ASSESSMENT — ENCOUNTER SYMPTOMS
DYSURIA: 0
CHILLS: 0
DIARRHEA: 0
NAUSEA: 0
BREAST MASS: 0
HEMATOCHEZIA: 0
FREQUENCY: 0
WEAKNESS: 0
PARESTHESIAS: 0
HEMATURIA: 0
JOINT SWELLING: 1
NERVOUS/ANXIOUS: 0
ABDOMINAL PAIN: 0
PALPITATIONS: 0
ARTHRALGIAS: 0
MYALGIAS: 0
CONSTIPATION: 1
COUGH: 0
SHORTNESS OF BREATH: 0
DIZZINESS: 0
SORE THROAT: 0
EYE PAIN: 0
FEVER: 0
HEADACHES: 0
HEARTBURN: 1

## 2021-07-08 ENCOUNTER — OFFICE VISIT (OUTPATIENT)
Dept: FAMILY MEDICINE | Facility: OTHER | Age: 51
End: 2021-07-08
Payer: COMMERCIAL

## 2021-07-08 VITALS
HEIGHT: 70 IN | SYSTOLIC BLOOD PRESSURE: 110 MMHG | OXYGEN SATURATION: 99 % | BODY MASS INDEX: 35.36 KG/M2 | TEMPERATURE: 97.8 F | WEIGHT: 247 LBS | HEART RATE: 67 BPM | RESPIRATION RATE: 16 BRPM | DIASTOLIC BLOOD PRESSURE: 66 MMHG

## 2021-07-08 DIAGNOSIS — N84.0 ENDOMETRIAL POLYP: ICD-10-CM

## 2021-07-08 DIAGNOSIS — R20.9 COLD EXTREMITIES: ICD-10-CM

## 2021-07-08 DIAGNOSIS — Z00.00 ROUTINE GENERAL MEDICAL EXAMINATION AT A HEALTH CARE FACILITY: Primary | ICD-10-CM

## 2021-07-08 DIAGNOSIS — R10.9 ABDOMINAL PRESSURE: ICD-10-CM

## 2021-07-08 DIAGNOSIS — Z12.31 ENCOUNTER FOR SCREENING MAMMOGRAM FOR BREAST CANCER: ICD-10-CM

## 2021-07-08 DIAGNOSIS — Z13.1 SCREENING FOR DIABETES MELLITUS: ICD-10-CM

## 2021-07-08 DIAGNOSIS — Z12.11 SCREEN FOR COLON CANCER: ICD-10-CM

## 2021-07-08 DIAGNOSIS — Z13.220 SCREENING FOR LIPOID DISORDERS: ICD-10-CM

## 2021-07-08 DIAGNOSIS — E66.01 MORBID OBESITY (H): ICD-10-CM

## 2021-07-08 LAB
CHOLEST SERPL-MCNC: 202 MG/DL
GLUCOSE SERPL-MCNC: 84 MG/DL (ref 70–99)
HDLC SERPL-MCNC: 57 MG/DL
LDLC SERPL CALC-MCNC: 125 MG/DL
NONHDLC SERPL-MCNC: 145 MG/DL
TRIGL SERPL-MCNC: 100 MG/DL
TSH SERPL DL<=0.005 MIU/L-ACNC: 1.67 MU/L (ref 0.4–4)

## 2021-07-08 PROCEDURE — 82947 ASSAY GLUCOSE BLOOD QUANT: CPT | Performed by: PHYSICIAN ASSISTANT

## 2021-07-08 PROCEDURE — 99213 OFFICE O/P EST LOW 20 MIN: CPT | Mod: 25 | Performed by: PHYSICIAN ASSISTANT

## 2021-07-08 PROCEDURE — 80061 LIPID PANEL: CPT | Performed by: PHYSICIAN ASSISTANT

## 2021-07-08 PROCEDURE — 36415 COLL VENOUS BLD VENIPUNCTURE: CPT | Performed by: PHYSICIAN ASSISTANT

## 2021-07-08 PROCEDURE — 99396 PREV VISIT EST AGE 40-64: CPT | Performed by: PHYSICIAN ASSISTANT

## 2021-07-08 PROCEDURE — 84443 ASSAY THYROID STIM HORMONE: CPT | Performed by: PHYSICIAN ASSISTANT

## 2021-07-08 ASSESSMENT — MIFFLIN-ST. JEOR: SCORE: 1819.38

## 2021-07-08 ASSESSMENT — PATIENT HEALTH QUESTIONNAIRE - PHQ9: SUM OF ALL RESPONSES TO PHQ QUESTIONS 1-9: 2

## 2021-07-08 ASSESSMENT — PAIN SCALES - GENERAL: PAINLEVEL: NO PAIN (0)

## 2021-07-08 NOTE — RESULT ENCOUNTER NOTE
Jay Jay Agosto    Your results were normal. Just a very mild elevation of cholesterol about the same as it has been in the past.     The results are attached for your review.       Dashawn Schmidt PA-C

## 2021-07-09 ENCOUNTER — HOSPITAL ENCOUNTER (OUTPATIENT)
Dept: ULTRASOUND IMAGING | Facility: CLINIC | Age: 51
Discharge: HOME | End: 2021-07-09
Attending: PHYSICIAN ASSISTANT | Admitting: PHYSICIAN ASSISTANT
Payer: COMMERCIAL

## 2021-07-09 DIAGNOSIS — R10.9 ABDOMINAL PRESSURE: ICD-10-CM

## 2021-07-09 DIAGNOSIS — N84.0 ENDOMETRIAL POLYP: ICD-10-CM

## 2021-07-09 PROCEDURE — 76830 TRANSVAGINAL US NON-OB: CPT

## 2021-07-11 ENCOUNTER — MYC MEDICAL ADVICE (OUTPATIENT)
Dept: FAMILY MEDICINE | Facility: OTHER | Age: 51
End: 2021-07-11

## 2021-07-11 NOTE — RESULT ENCOUNTER NOTE
Jay Jay Agosto    Your results show a possible polyp or a fibroid. There is also a possible cyst on one of your ovaries that should be followed up on in 6-10 weeks. Since you are also having that pressure, I would recommend follow up with one of our GYN providers.     The results are attached for your review.       Dashawn Schmidt PA-C

## 2021-07-12 NOTE — TELEPHONE ENCOUNTER
Per my last visit note, I had advised apt with GYN provider.    Dashawn Schmidt PA-C  [facility]

## 2021-08-02 ENCOUNTER — OFFICE VISIT (OUTPATIENT)
Dept: OBGYN | Facility: CLINIC | Age: 51
End: 2021-08-02
Payer: COMMERCIAL

## 2021-08-02 VITALS
OXYGEN SATURATION: 99 % | SYSTOLIC BLOOD PRESSURE: 109 MMHG | HEART RATE: 65 BPM | WEIGHT: 244 LBS | BODY MASS INDEX: 35.09 KG/M2 | DIASTOLIC BLOOD PRESSURE: 76 MMHG

## 2021-08-02 DIAGNOSIS — R10.2 PELVIC PRESSURE IN FEMALE: ICD-10-CM

## 2021-08-02 DIAGNOSIS — Z12.11 SPECIAL SCREENING FOR MALIGNANT NEOPLASMS, COLON: ICD-10-CM

## 2021-08-02 DIAGNOSIS — N92.6 IRREGULAR MENSES: Primary | ICD-10-CM

## 2021-08-02 PROCEDURE — 99214 OFFICE O/P EST MOD 30 MIN: CPT | Performed by: OBSTETRICS & GYNECOLOGY

## 2021-08-11 ENCOUNTER — TELEPHONE (OUTPATIENT)
Dept: FAMILY MEDICINE | Facility: CLINIC | Age: 51
End: 2021-08-11

## 2021-09-01 ENCOUNTER — TELEPHONE (OUTPATIENT)
Dept: FAMILY MEDICINE | Facility: CLINIC | Age: 51
End: 2021-09-01

## 2021-09-01 ENCOUNTER — MYC MEDICAL ADVICE (OUTPATIENT)
Dept: FAMILY MEDICINE | Facility: CLINIC | Age: 51
End: 2021-09-01

## 2021-09-01 NOTE — TELEPHONE ENCOUNTER
The nature of her appointment requires an in-person visit.  She also sent a [employer] message regarding a work note for COVID-19.  I recommended RN triage as she reported worsening symptoms.    Sending this to RN triage as well.  If already addressed in [employer] message, this note can be closed.    Dallas Garcia MD

## 2021-09-01 NOTE — TELEPHONE ENCOUNTER
Patient is scheduled with Dr. Garcia on Friday, 9/3. Patient is calling to state she tested positive for Covid on 8/25 and is still not feeling well. Patient is questioning if her appointment on Friday could be a phone visit or if she would be able to be seen or not? Patient is also waiting on response from her employer as to what they need as she does not feel well enough to go back to work on Saturday. Please advise. Danielle Schaefer LPN

## 2021-09-01 NOTE — TELEPHONE ENCOUNTER
Patient went to  in [place] not realizing it wasn't a [place] facility due to symptoms starting 9 days ago.  She states she is not getting worse, but she is not well enough to go back to work on 9/4/21.  She states when she is up and moving around or talking she has coughing fits which causes her to have breathing issues.      She spoke to her HR department and they are stating she needs a note from a provider stating she cannot go back to work and needs an extension on her leave. She will attach her positive COVID result to [employer] [facility] for Dr. Garcia to review.    She is hoping to still have a virtual visit to discuss her current symptoms for some possible medical treatment.  She did receive an albuterol inhaler and liquid steroids upon arrival to the , but the steroids wore off within 3 hours.  She states the inhaler will help when she has a coughing fit, but overall not helpful to loosen up her cough and open her lungs.  She states she is trying not to cry, but is only able to lay in her chair to get relief from her symptoms.  She has lots of pressure in her head and has these symptoms for 9 days without relief.  RN changed visit to telephone in case OK with Dr. Garcia.    Patient is informed this will be discussed when Dr. Garcia is back in clinic tomorrow.  DANGELO HigginsN, RN

## 2021-09-01 NOTE — TELEPHONE ENCOUNTER
This requires RN triage.  Will forward to RN pool for further review with patient to make sure she does not need additional evaluation now and information about her return to work letter for COVID-19.    Dallas Garcia MD

## 2021-09-02 NOTE — TELEPHONE ENCOUNTER
I have replied to the patient's [employer] message with the message below. Josette Lopez, CMA

## 2021-09-02 NOTE — TELEPHONE ENCOUNTER
Telephone visit is fine, but if her symptoms worsen, she needs to be evaluated in person.  I can write her letter at the time of her visit.    Will have staff notify patient.     Dallas Garcia MD

## 2021-09-03 ENCOUNTER — VIRTUAL VISIT (OUTPATIENT)
Dept: FAMILY MEDICINE | Facility: CLINIC | Age: 51
End: 2021-09-03
Payer: COMMERCIAL

## 2021-09-03 DIAGNOSIS — R06.02 SHORTNESS OF BREATH: ICD-10-CM

## 2021-09-03 DIAGNOSIS — U07.1 2019 NOVEL CORONAVIRUS DISEASE (COVID-19): Primary | ICD-10-CM

## 2021-09-03 PROCEDURE — 99213 OFFICE O/P EST LOW 20 MIN: CPT | Mod: 95 | Performed by: FAMILY MEDICINE

## 2021-09-03 RX ORDER — ALBUTEROL SULFATE 90 UG/1
AEROSOL, METERED RESPIRATORY (INHALATION)
COMMUNITY
Start: 2021-08-24 | End: 2022-12-30

## 2021-09-03 RX ORDER — INHALER, ASSIST DEVICES
SPACER (EA) MISCELLANEOUS
Qty: 1 EACH | Refills: 3 | Status: SHIPPED | OUTPATIENT
Start: 2021-09-03 | End: 2022-12-30

## 2021-09-03 NOTE — LETTER
September 3, 2021    To Whom it may Concern:    Triny Bower was seen today for a virtual visit.  She is having persistent COVID-19 symptoms and will be unable to return to work until 9/15/2021.    If you have any further questions, please contact me at the number above.    Sincerely,        Dallas Garcia MD

## 2021-09-03 NOTE — PROGRESS NOTES
Triny is a 51 year old who is being evaluated via a billable telephone visit.      What phone number would you like to be contacted at? [phone]  [facility]    Assessment & Plan     ASSESSMENT/ORDERS:    ICD-10-CM    1. 2019 novel coronavirus disease (COVID-19)  U07.1    2. Shortness of breath  R06.02 spacer (OPTICHAMBER AGUILA) holding chamber     PLAN:  1.  Patient still having shortness of breath, dyspnea on exertion and bad cough.  Unable to work at this time.  Quarantine for COVID-19 done tomorrow.  Note for work stating she cannot go and follow-up appointment scheduled for further evaluation.   2.  Spacer for albuterol inhaler.  She will try this.  May have asthma.   3.  Increased hydration advised to help with dizziness/lightheadedness.                 Return in 11 days (on 9/14/2021) for recheck shortness of breath.    Dallas Garcia MD  [facility]   Triny is a 51 year old who presents for the following health issues     HPI     ED/UC Followup:    Facility:  [facility]  Date of visit: 8/24/2021  Reason for visit: covid  Current Status: still coughing, has shortness of breath, fatigued, headache, light headed         Diagnosed with COVID-19 on 8/24, positive test.  Still having cough, dyspnea on exertion and shortness of breath.  Some relief when she uses albuterol inhaler.  Off quarantine tomorrow, but she does not feel she can work.    Has not done well with mask wearing.  Could not work last year due to mandate. Coughs a lot when wearing it. No diagnosed history of asthma.    Is having some dizziness/lightheadedness.  Drinking 32 ounces of water at most/day.      Review of Systems         Objective           Vitals:  No vitals were obtained today due to virtual visit.    Physical Exam   alert and mild distress, fatigued  PSYCH: Alert and oriented times 3; coherent speech, normal   rate and  volume, able to articulate logical thoughts, able   to abstract reason, no tangential thoughts, no hallucinations   or delusions  Her affect is normal  RESP: intermittent cough, no audible wheezing, able to talk in full sentences for short periods of time.  Sounds exhausted after talking a lot.  Remainder of exam unable to be completed due to telephone visits                Phone call duration: 22 minutes

## 2021-09-17 ENCOUNTER — OFFICE VISIT (OUTPATIENT)
Dept: FAMILY MEDICINE | Facility: CLINIC | Age: 51
End: 2021-09-17
Payer: COMMERCIAL

## 2021-09-17 VITALS
SYSTOLIC BLOOD PRESSURE: 100 MMHG | HEART RATE: 80 BPM | OXYGEN SATURATION: 97 % | TEMPERATURE: 97.2 F | BODY MASS INDEX: 34.8 KG/M2 | DIASTOLIC BLOOD PRESSURE: 64 MMHG | WEIGHT: 242 LBS | RESPIRATION RATE: 18 BRPM

## 2021-09-17 DIAGNOSIS — U07.1 2019 NOVEL CORONAVIRUS DISEASE (COVID-19): Primary | ICD-10-CM

## 2021-09-17 DIAGNOSIS — R06.02 SHORTNESS OF BREATH: ICD-10-CM

## 2021-09-17 PROCEDURE — 99214 OFFICE O/P EST MOD 30 MIN: CPT | Performed by: FAMILY MEDICINE

## 2021-09-17 ASSESSMENT — PAIN SCALES - GENERAL: PAINLEVEL: NO PAIN (0)

## 2021-09-17 NOTE — PROGRESS NOTES
Assessment & Plan     ASSESSMENT/ORDERS:    ICD-10-CM    1. 2019 novel coronavirus disease (COVID-19)  U07.1    2. Shortness of breath  R06.02      PLAN:  1.  Breathing issues still not fully resolved.  Patient is going to trial return to work and see how this goes.  Discussed using albuterol inhaler prior to working to see if she gets improvement in her breathing when she is wearing a mask and going in and out of the cold air.  Follow-up after she has worked to discuss if this is helping her now.  If so, would next consider inhaled corticosteroid for controller as she may have cold-air induced asthma.  2.  Discussed current recommendations for COVID-19 vaccination post disease.                 No follow-ups on file.    Dallas Garcia MD  [facility]osto is a 51 year old who presents for the following health issues     HPI     Chief Complaint   Patient presents with     Covid Concern     Recheck. 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  Is having problems with mask wearing since the start of the pandemic due to the condensation, breathing issues.  Work has 100% mask wearing policy.  She has some shortness of breath with wearing mask and then has headaches when she goes home.  Breathing issues have been bad since getting COVID-19.    Review of Systems         Objective    Pulse 80   Temp 97.2  F (36.2  C) (Temporal)   Resp 18   Wt 109.8 kg (242 lb)   SpO2 97%   BMI 34.80 kg/m    Body mass index is 34.8 kg/m .  Physical Exam  Constitutional:       Appearance: She is well-developed. She is obese.   Cardiovascular:      Rate and Rhythm: Normal rate and regular rhythm.      Heart sounds: Normal heart sounds, S1 normal and S2 normal. No murmur heard.     Pulmonary:      Effort: Pulmonary effort is normal. No respiratory distress.      Breath sounds: Normal breath sounds. No wheezing, rhonchi or  rales.   Neurological:      Mental Status: She is alert.

## 2021-09-17 NOTE — PATIENT INSTRUCTIONS
Psyllium fiber (metamucil) 2 doses twice daily.  Needs to be the powdered version that you add to liquid NOT the tablets       ounces of water daily.  Measure your amount.

## 2021-09-24 ENCOUNTER — TELEPHONE (OUTPATIENT)
Dept: FAMILY MEDICINE | Facility: CLINIC | Age: 51
End: 2021-09-24

## 2021-09-24 NOTE — TELEPHONE ENCOUNTER
Reason for Call:  Form, our goal is to have forms completed with 72 hours, however, some forms may require a visit or additional information.    Type of letter, form or note:  FMLA    Who is the form from?: Insurance comp    Where did the form come from: form was faxed in    What clinic location was the form placed at?: [facility]     Where the form was placed: Dr. Church Box/Folder    What number is listed as a contact on the form?: [phone]       Additional comments: Medical information to support leave.    Call taken on 9/24/2021 at 4:40 PM by Danielle Schaefer LPN

## 2021-09-26 ENCOUNTER — HEALTH MAINTENANCE LETTER (OUTPATIENT)
Age: 51
End: 2021-09-26

## 2021-09-28 NOTE — TELEPHONE ENCOUNTER
Called and LM for patient to call back. Please find out if she will be picking up her forms they are completed. There is a page that needs to be filled out by patient so we are unable to fax back to Insurance company.     Rose Mosley MA

## 2021-09-28 NOTE — TELEPHONE ENCOUNTER
I have completed the paperwork, but staff needs to fill in fax number and then contact patient and find out when she wants to pick it up.      DO NOT RETURN TO INSURANCE COMPANY! There is a page that the patient has to sign for consent to release information first.    Forms left on keyboard of desk.    Dallas Garcia MD

## 2021-09-29 NOTE — TELEPHONE ENCOUNTER
I contacted patient- however the provider did not sign them. Please sign- they are in mailbox.   Hanane Morris MA on 9/29/2021 at 1:57 PM

## 2021-10-04 NOTE — TELEPHONE ENCOUNTER
Patient is calling back to check status on the forms as she would like to come pick them up and get them turned into her short term disability.  Please call.

## 2022-02-22 ENCOUNTER — TELEPHONE (OUTPATIENT)
Dept: OBGYN | Facility: CLINIC | Age: 52
End: 2022-02-22

## 2022-02-22 NOTE — TELEPHONE ENCOUNTER
Patient Quality Outreach    Patient is due for the following:   Colon Cancer Screening -  Colonoscopy  Breast Cancer Screening - Mammogram    NEXT STEPS:   Schedule a Colonoscopy and Mammogram    Type of outreach:    Sent [employer] message.    Next Steps:  Reach out within 90 days via [employer].    Max number of attempts reached: No. Will try again in 90 days if patient still on fail list.    Questions for provider review:    None     Kerrie Flores MA  Chart routed to Care Team.

## 2022-03-13 ENCOUNTER — HEALTH MAINTENANCE LETTER (OUTPATIENT)
Age: 52
End: 2022-03-13

## 2022-05-10 NOTE — TELEPHONE ENCOUNTER
Patient Quality Outreach    Patient is due for the following:   Colon Cancer Screening -  Colonoscopy  Breast Cancer Screening - Mammogram  Physical  - Due after 7/8/2022    NEXT STEPS:   Schedule a yearly physical    Type of outreach:    Sent [employer] message.    Next Steps:  Reach out within 90 days via [employer].    Max number of attempts reached: No. Will try again in 90 days if patient still on fail list.    Questions for provider review:    None     Kerrie Flores MA  Chart routed to Care Team.

## 2022-08-09 NOTE — TELEPHONE ENCOUNTER
Patient Quality Outreach    Patient is due for the following:   Colon Cancer Screening  Breast Cancer Screening - Mammogram  Physical  - overdue    Next Steps:   Patient was scheduled for Physical    Type of outreach:    Sent [employer] message.    Next Steps:  Reach out within 90 days via [employer].    Max number of attempts reached: Yes. Will try again in 90 days if patient still on fail list.    Questions for provider review:    None     Kerrie Flores MA  Chart routed to Care Team.

## 2022-08-28 ENCOUNTER — HEALTH MAINTENANCE LETTER (OUTPATIENT)
Age: 52
End: 2022-08-28

## 2022-12-30 ENCOUNTER — APPOINTMENT (OUTPATIENT)
Dept: ULTRASOUND IMAGING | Facility: CLINIC | Age: 52
End: 2022-12-30
Attending: PHYSICIAN ASSISTANT
Payer: COMMERCIAL

## 2022-12-30 ENCOUNTER — HOSPITAL ENCOUNTER (EMERGENCY)
Facility: CLINIC | Age: 52
Discharge: HOME | End: 2022-12-30
Attending: PHYSICIAN ASSISTANT | Admitting: PHYSICIAN ASSISTANT
Payer: COMMERCIAL

## 2022-12-30 VITALS
RESPIRATION RATE: 18 BRPM | SYSTOLIC BLOOD PRESSURE: 114 MMHG | TEMPERATURE: 97.9 F | HEART RATE: 77 BPM | BODY MASS INDEX: 36.1 KG/M2 | DIASTOLIC BLOOD PRESSURE: 85 MMHG | OXYGEN SATURATION: 99 % | WEIGHT: 251 LBS

## 2022-12-30 DIAGNOSIS — K21.9 GASTROESOPHAGEAL REFLUX DISEASE: ICD-10-CM

## 2022-12-30 DIAGNOSIS — R10.13 ABDOMINAL PAIN, EPIGASTRIC: ICD-10-CM

## 2022-12-30 LAB
ALBUMIN SERPL-MCNC: 3.4 G/DL (ref 3.4–5)
ALBUMIN UR-MCNC: NEGATIVE MG/DL
ALP SERPL-CCNC: 45 U/L (ref 40–150)
ALT SERPL W P-5'-P-CCNC: 18 U/L (ref 0–50)
ANION GAP SERPL CALCULATED.3IONS-SCNC: 6 MMOL/L (ref 3–14)
APPEARANCE UR: ABNORMAL
AST SERPL W P-5'-P-CCNC: 9 U/L (ref 0–45)
BACTERIA #/AREA URNS HPF: ABNORMAL /HPF
BASOPHILS # BLD AUTO: 0.1 10E3/UL (ref 0–0.2)
BASOPHILS NFR BLD AUTO: 1 %
BILIRUB SERPL-MCNC: 0.2 MG/DL (ref 0.2–1.3)
BILIRUB UR QL STRIP: NEGATIVE
BUN SERPL-MCNC: 14 MG/DL (ref 7–30)
CALCIUM SERPL-MCNC: 8.7 MG/DL (ref 8.5–10.1)
CHLORIDE BLD-SCNC: 109 MMOL/L (ref 94–109)
CO2 SERPL-SCNC: 26 MMOL/L (ref 20–32)
COLOR UR AUTO: YELLOW
CREAT SERPL-MCNC: 0.86 MG/DL (ref 0.52–1.04)
EOSINOPHIL # BLD AUTO: 0.2 10E3/UL (ref 0–0.7)
EOSINOPHIL NFR BLD AUTO: 2 %
ERYTHROCYTE [DISTWIDTH] IN BLOOD BY AUTOMATED COUNT: 13.9 % (ref 10–15)
GFR SERPL CREATININE-BSD FRML MDRD: 81 ML/MIN/1.73M2
GLUCOSE BLD-MCNC: 83 MG/DL (ref 70–99)
GLUCOSE UR STRIP-MCNC: NEGATIVE MG/DL
HCT VFR BLD AUTO: 38.8 % (ref 35–47)
HGB BLD-MCNC: 12.8 G/DL (ref 11.7–15.7)
HGB UR QL STRIP: NEGATIVE
IMM GRANULOCYTES # BLD: 0 10E3/UL
IMM GRANULOCYTES NFR BLD: 0 %
KETONES UR STRIP-MCNC: NEGATIVE MG/DL
LEUKOCYTE ESTERASE UR QL STRIP: NEGATIVE
LIPASE SERPL-CCNC: 171 U/L (ref 73–393)
LYMPHOCYTES # BLD AUTO: 3.7 10E3/UL (ref 0.8–5.3)
LYMPHOCYTES NFR BLD AUTO: 35 %
MCH RBC QN AUTO: 27.9 PG (ref 26.5–33)
MCHC RBC AUTO-ENTMCNC: 33 G/DL (ref 31.5–36.5)
MCV RBC AUTO: 85 FL (ref 78–100)
MONOCYTES # BLD AUTO: 0.7 10E3/UL (ref 0–1.3)
MONOCYTES NFR BLD AUTO: 6 %
MUCOUS THREADS #/AREA URNS LPF: PRESENT /LPF
NEUTROPHILS # BLD AUTO: 5.9 10E3/UL (ref 1.6–8.3)
NEUTROPHILS NFR BLD AUTO: 56 %
NITRATE UR QL: NEGATIVE
NRBC # BLD AUTO: 0 10E3/UL
NRBC BLD AUTO-RTO: 0 /100
PH UR STRIP: 5 [PH] (ref 5–7)
PLATELET # BLD AUTO: 358 10E3/UL (ref 150–450)
POTASSIUM BLD-SCNC: 3.8 MMOL/L (ref 3.4–5.3)
PROT SERPL-MCNC: 7.1 G/DL (ref 6.8–8.8)
RBC # BLD AUTO: 4.59 10E6/UL (ref 3.8–5.2)
RBC URINE: 0 /HPF
SODIUM SERPL-SCNC: 141 MMOL/L (ref 133–144)
SP GR UR STRIP: 1.01 (ref 1–1.03)
SQUAMOUS EPITHELIAL: 2 /HPF
TROPONIN I SERPL HS-MCNC: <3 NG/L
UROBILINOGEN UR STRIP-MCNC: NORMAL MG/DL
WBC # BLD AUTO: 10.6 10E3/UL (ref 4–11)
WBC URINE: 2 /HPF

## 2022-12-30 PROCEDURE — 99285 EMERGENCY DEPT VISIT HI MDM: CPT | Mod: 25 | Performed by: PHYSICIAN ASSISTANT

## 2022-12-30 PROCEDURE — 250N000013 HC RX MED GY IP 250 OP 250 PS 637: Performed by: PHYSICIAN ASSISTANT

## 2022-12-30 PROCEDURE — 250N000009 HC RX 250: Performed by: PHYSICIAN ASSISTANT

## 2022-12-30 PROCEDURE — 76705 ECHO EXAM OF ABDOMEN: CPT

## 2022-12-30 PROCEDURE — 81001 URINALYSIS AUTO W/SCOPE: CPT | Performed by: PHYSICIAN ASSISTANT

## 2022-12-30 PROCEDURE — 84484 ASSAY OF TROPONIN QUANT: CPT | Performed by: PHYSICIAN ASSISTANT

## 2022-12-30 PROCEDURE — 93010 ELECTROCARDIOGRAM REPORT: CPT | Performed by: PHYSICIAN ASSISTANT

## 2022-12-30 PROCEDURE — 85025 COMPLETE CBC W/AUTO DIFF WBC: CPT | Performed by: PHYSICIAN ASSISTANT

## 2022-12-30 PROCEDURE — 83690 ASSAY OF LIPASE: CPT | Performed by: PHYSICIAN ASSISTANT

## 2022-12-30 PROCEDURE — 93005 ELECTROCARDIOGRAM TRACING: CPT | Performed by: PHYSICIAN ASSISTANT

## 2022-12-30 PROCEDURE — 80053 COMPREHEN METABOLIC PANEL: CPT | Performed by: PHYSICIAN ASSISTANT

## 2022-12-30 PROCEDURE — 36415 COLL VENOUS BLD VENIPUNCTURE: CPT | Performed by: PHYSICIAN ASSISTANT

## 2022-12-30 RX ORDER — SIMETHICONE 125 MG
125 TABLET,CHEWABLE ORAL 2 TIMES DAILY PRN
COMMUNITY
End: 2023-01-02

## 2022-12-30 RX ORDER — CALCIUM CARBONATE AND SIMETHICONE 750; 80 MG/1; MG/1
2 TABLET, CHEWABLE ORAL 3 TIMES DAILY PRN
COMMUNITY
End: 2023-01-05

## 2022-12-30 RX ORDER — OMEPRAZOLE 20 MG/1
40 TABLET, DELAYED RELEASE ORAL 2 TIMES DAILY
COMMUNITY

## 2022-12-30 RX ADMIN — ALUMINUM HYDROXIDE, MAGNESIUM HYDROXIDE, AND SIMETHICONE 30 ML: 200; 200; 20 SUSPENSION ORAL at 18:32

## 2022-12-30 ASSESSMENT — ACTIVITIES OF DAILY LIVING (ADL)
ADLS_ACUITY_SCORE: 35
ADLS_ACUITY_SCORE: 35

## 2022-12-30 NOTE — ED PROVIDER NOTES
History     Chief Complaint   Patient presents with     Abdominal Pain     HPI  Triny Bower is a 52 year old female who presents for evaluation of epigastric abdominal pain off and on for the past 6 weeks.  She experiences symptoms daily, but some days are much worse than others.  Today is a bad day.  She reports the pain is about 7 on a scale of 10 and describes a sour, burning, and on fire feeling in her epigastric area coming up into the substernal area.  She notes significant bloating to the point that she can even see her abdomen bloating all.  Nausea but no vomiting.  Bowel movements daily without blood or pus in the stool.  No melena.  Denies any dysuria, frequency, urgency, flank pain, or gross hematuria.  No change in vaginal discharge.  No skin rashes.  No URI symptoms.  No chest pain radiating into her jaw or upper extremities.  She went to urgent care 2 days ago and they did an x-ray.  Told her that she was full of gas but not a significant amount of stool.  Regardless, they put her on milk of magnesia and Tums gas relief.  She has been taking these medications regularly and has been having significant amount of loose stools since then.  Pain has not changed.  No prior history of PUD.  She does not drink alcohol.  No tobacco use.  Stress is stable.  Father did have gallbladder disease.  She does take omeprazole as needed, and states that symptoms do help.  She tries not to take it.  Has not taken omeprazole in the last 3 days.          Allergies:  No Known Allergies    Problem List:    Patient Active Problem List    Diagnosis Date Noted     Morbid obesity (H) 07/08/2021     Priority: Medium     Tendinitis of left wrist 04/04/2018     Priority: Medium     Hyperlipidemia LDL goal <130 03/22/2018     Priority: Medium     Gastroesophageal reflux disease without esophagitis 06/29/2015     Priority: Medium     Constipation, unspecified constipation type 06/29/2015     Priority: Medium     Major  depressive disorder, recurrent episode, mild (H) 10/04/2012     Priority: Medium     Displacement of lumbar intervertebral disc without myelopathy 08/21/2012     Priority: Medium     Herniated nucleus pulposus, L5-S1, right 06/06/2012     Priority: Medium     historically       Lumbar radiculopathy 12/17/2010     Priority: Medium        Past Medical History:    Past Medical History:   Diagnosis Date     Displacement of lumbar intervertebral disc without myelopathy 01/20/10     Hyperlipidemia LDL goal <130 3/22/2018       Past Surgical History:    Past Surgical History:   Procedure Laterality Date     BACK SURGERY       DILATE CERVIX, HYSTEROSCOPY, ABLATE ENDOMETRIUM NOVASURE, COMBINED N/A 7/31/2019    Procedure: Dilateand curretage cervix, hysteroscopy, ablate endometrium novasure, combined;  Surgeon: Viral Hodges MD;  Location: [facility]     GENITOURINARY SURGERY  04/2018     HEMILAMINECTOMY, DISCECTOMY LUMBAR TWO LEVELS, COMBINED  8/22/2012    Procedure: COMBINED HEMILAMINECTOMY, DISCECTOMY LUMBAR TWO LEVELS;  Discectomy right Lumbar 4-Sacral 1, Hemilaminectomy bilateral Lumbar 4-Sacral 1;  Surgeon: Shree Martinez MD;  Location: [facility]     LAMINECT/DISCECTOMY, LUMBAR  01/20/10    L4-5     OPERATIVE HYSTEROSCOPY WITH MORCELLATOR N/A 7/31/2019    Procedure: Diagnostic hysteroscopy, myosure myomectomy;  Surgeon: Viral Hodges MD;  Location: [facility]     TUBAL LIGATION  1995       Family History:    Family History   Problem Relation Age of Onset     Cancer Maternal Grandmother         cervical cancer     Gynecology Maternal Grandmother         cervical cancer     Arthritis Paternal Grandmother      Osteoporosis Paternal Grandmother      Breast Cancer Mother         70's     Hypertension Mother        Social History:  Marital Status:   [2]  Social History     Tobacco Use     Smoking status: Former     Packs/day: 0.00     Years: 10.00     Pack years: 0.00     Types: Cigarettes      Smokeless tobacco: Never   Vaping Use     Vaping Use: Never used   Substance Use Topics     Alcohol use: Not Currently     Comment: Very Rare     Drug use: No        Medications:    Calcium Carbonate-Simethicone (TUMS GAS RELIEF CHEWY BITES) 750-80 MG CHEW  magnesium hydroxide (MILK OF MAGNESIA) 400 MG/5ML suspension  omeprazole (PRILOSEC OTC) 20 MG EC tablet  psyllium (METAMUCIL/KONSYL) 58.6 % powder  simethicone (MYLICON) 125 MG chewable tablet          Review of Systems   All other systems reviewed and are negative.      Physical Exam   BP: 114/85  Pulse: 77  Temp: 97.9  F (36.6  C)  Resp: 18  Weight: 113.9 kg (251 lb)  SpO2: 99 %      Physical Exam  Vitals and nursing note reviewed.   Constitutional:       General: She is not in acute distress.     Appearance: She is not diaphoretic.   HENT:      Head: Normocephalic and atraumatic.      Right Ear: External ear normal.      Left Ear: External ear normal.      Nose: Nose normal.      Mouth/Throat:      Mouth: Mucous membranes are moist.      Pharynx: No pharyngeal swelling or oropharyngeal exudate.   Eyes:      General: No scleral icterus.        Right eye: No discharge.         Left eye: No discharge.      Conjunctiva/sclera: Conjunctivae normal.      Pupils: Pupils are equal, round, and reactive to light.   Neck:      Thyroid: No thyromegaly.   Cardiovascular:      Rate and Rhythm: Normal rate and regular rhythm.      Heart sounds: Normal heart sounds. No murmur heard.  Pulmonary:      Effort: Pulmonary effort is normal. No respiratory distress.      Breath sounds: Normal breath sounds. No wheezing or rales.   Chest:      Chest wall: No tenderness.   Abdominal:      General: Bowel sounds are normal. There is no distension.      Palpations: Abdomen is soft. There is no hepatomegaly, splenomegaly or mass.      Tenderness: There is abdominal tenderness in the epigastric area. There is no right CVA tenderness, left CVA tenderness, guarding or rebound.      Hernia:  There is no hernia in the umbilical area or ventral area.   Musculoskeletal:         General: No tenderness or deformity. Normal range of motion.      Cervical back: Normal range of motion and neck supple.      Comments: No lower extremity edema.  No calf tenderness.  Negative Homans' sign.   Lymphadenopathy:      Cervical: No cervical adenopathy.   Skin:     General: Skin is warm and dry.      Capillary Refill: Capillary refill takes less than 2 seconds.      Findings: No erythema or rash.   Neurological:      Mental Status: She is alert and oriented to person, place, and time.      Cranial Nerves: No cranial nerve deficit.   Psychiatric:         Behavior: Behavior normal.         Thought Content: Thought content normal.         ED Course                 Procedures       EKG Interpretation:      EKG Number: 1  Interpreted by Francisco Javier Calabrese PA-C  Symptoms at time of EKG: Epigastric abdominal pain.  Rhythm: Normal sinus   Rate: Normal  Axis: Normal  Ectopy: None  Conduction: Normal  ST Segments/ T Waves: No ST-T wave changes and No acute ischemic changes  Q Waves: None  Comparison to prior: No old EKG available    Clinical Impression: normal EKG                   Critical Care time:  none               Results for orders placed or performed during the hospital encounter of 12/30/22 (from the past 24 hour(s))   CBC with platelets differential    Narrative    The following orders were created for panel order CBC with platelets differential.  Procedure                               Abnormality         Status                     ---------                               -----------         ------                     CBC with platelets and d...[[id number]]                      Final result                 Please view results for these tests on the individual orders.   Comprehensive metabolic panel   Result Value Ref Range    Sodium 141 133 - 144 mmol/L    Potassium 3.8 3.4 - 5.3 mmol/L    Chloride 109 94 - 109 mmol/L     Carbon Dioxide (CO2) 26 20 - 32 mmol/L    Anion Gap 6 3 - 14 mmol/L    Urea Nitrogen 14 7 - 30 mg/dL    Creatinine 0.86 0.52 - 1.04 mg/dL    Calcium 8.7 8.5 - 10.1 mg/dL    Glucose 83 70 - 99 mg/dL    Alkaline Phosphatase 45 40 - 150 U/L    AST 9 0 - 45 U/L    ALT 18 0 - 50 U/L    Protein Total 7.1 6.8 - 8.8 g/dL    Albumin 3.4 3.4 - 5.0 g/dL    Bilirubin Total 0.2 0.2 - 1.3 mg/dL    GFR Estimate 81 >60 mL/min/1.73m2   Lipase   Result Value Ref Range    Lipase 171 73 - 393 U/L   Troponin I   Result Value Ref Range    Troponin I High Sensitivity <3 <54 ng/L   CBC with platelets and differential   Result Value Ref Range    WBC Count 10.6 4.0 - 11.0 10e3/uL    RBC Count 4.59 3.80 - 5.20 10e6/uL    Hemoglobin 12.8 11.7 - 15.7 g/dL    Hematocrit 38.8 35.0 - 47.0 %    MCV 85 78 - 100 fL    MCH 27.9 26.5 - 33.0 pg    MCHC 33.0 31.5 - 36.5 g/dL    RDW 13.9 10.0 - 15.0 %    Platelet Count 358 150 - 450 10e3/uL    % Neutrophils 56 %    % Lymphocytes 35 %    % Monocytes 6 %    % Eosinophils 2 %    % Basophils 1 %    % Immature Granulocytes 0 %    NRBCs per 100 WBC 0 <1 /100    Absolute Neutrophils 5.9 1.6 - 8.3 10e3/uL    Absolute Lymphocytes 3.7 0.8 - 5.3 10e3/uL    Absolute Monocytes 0.7 0.0 - 1.3 10e3/uL    Absolute Eosinophils 0.2 0.0 - 0.7 10e3/uL    Absolute Basophils 0.1 0.0 - 0.2 10e3/uL    Absolute Immature Granulocytes 0.0 <=0.4 10e3/uL    Absolute NRBCs 0.0 10e3/uL   UA with Microscopic reflex to Culture    Specimen: Urine, NOS   Result Value Ref Range    Color Urine Yellow Colorless, Straw, Light Yellow, Yellow    Appearance Urine Slightly Cloudy (A) Clear    Glucose Urine Negative Negative mg/dL    Bilirubin Urine Negative Negative    Ketones Urine Negative Negative mg/dL    Specific Gravity Urine 1.009 1.003 - 1.035    Blood Urine Negative Negative    pH Urine 5.0 5.0 - 7.0    Protein Albumin Urine Negative Negative mg/dL    Urobilinogen Urine Normal Normal, 2.0 mg/dL    Nitrite Urine Negative Negative     Leukocyte Esterase Urine Negative Negative    Bacteria Urine Many (A) None Seen /HPF    Mucus Urine Present (A) None Seen /LPF    RBC Urine 0 <=2 /HPF    WBC Urine 2 <=5 /HPF    Squamous Epithelials Urine 2 (H) <=1 /HPF    Narrative    Urine Culture not indicated   US Abdomen Limited (RUQ)    Narrative    EXAM: US ABDOMEN LIMITED  LOCATION: [facility]  DATE/TIME: 12/30/2022 6:03 PM    INDICATION: RUQ epigastric pain and bloating.  COMPARISON: None.  TECHNIQUE: Limited abdominal ultrasound.    FINDINGS:    GALLBLADDER: Incompletely distended, exaggerating wall thickness. No echogenic gallstones. Negative ultrasonic Goodwin's sign.    BILE DUCTS: No biliary dilatation. The common duct measures 4 mm.    LIVER: Mildly echogenic liver parenchyma. No focal mass.    RIGHT KIDNEY: No hydronephrosis.    PANCREAS: The visualized portions are normal.    No ascites.      Impression    IMPRESSION:    1.  Incomplete gallbladder distention, though no cholelithiasis identified.    2.  Mild hepatic steatosis.           Medications   lidocaine (viscous) (XYLOCAINE) 2 % 15 mL, alum & mag hydroxide-simethicone (MAALOX) 15 mL GI Cocktail (30 mLs Oral Given 12/30/22 1832)       Assessments & Plan (with Medical Decision Making)     Abdominal pain, epigastric  Gastroesophageal reflux disease     52 year old female presents for evaluation of epigastric abdominal pain, bloating, and nausea off and on for the past 6 weeks.  Improved with omeprazole, but she does not take this regularly.  Normal bowel movements.  No urinary symptoms.  Epigastric and substernal discomfort described as sour, burning, and on fire.  No blood or pus in the stool.  No melena.  No vomiting.  See HPI above for details.  On exam blood pressure 114/85, temperature 97.9, pulse 77, respiration 18, oxygen saturation 99% on room air.  Patient is in no acute distress.  She is rubbing her epigastric area during the evaluation.  She has  tenderness in the epigastric area without rebound or guarding.  No chest wall tenderness.  Cardiopulmonary exam normal.  No adventitious lung sounds.  Good bowel sounds.  Negative Goodwin sign.  No hepatosplenomegaly.  No other significant tenderness in the abdomen.  No lower extremity edema no calf tenderness.  IV was established.  She declined any medication management initially.  Laboratory levels were all stable with CBC, troponin, lipase, and comprehensive metabolic panel.  Urinalysis negative for any nitrate, leukocyte Estrace, or significant abnormalities.  Right upper quadrant ultrasound with incomplete gallbladder distention but there is no cholelithiasis identified.  Mild hepatic steatosis.  EKG without acute ST or T wave elevation.  No arrhythmia.  GI cocktail provided and her symptoms significantly decreased down to 3 on a scale of 10.  She did not have any more the burning discomfort.  Given her reassuring laboratory tests and no sign of organ dysfunction, she likely is experiencing gastritis with GERD.  Certainly could have gallbladder dysfunction, but emergent etiologies have been ruled out at this point.  Trial of antacid therapy with omeprazole and famotidine.  [place] diet discussed.  Push clear fluids.  Avoid caffeine.  Avoid acidic or spicy foods.  General surgery appointment recommended 1 week from now to reassess her situation.  She certainly may be a candidate for EGD if symptoms not resolving, or could proceed with nonemergent HIDA scan if she is experiencing more biliary colic type symptoms.  Indications for ED return reviewed with the patient.  She is in agreement with this plan and was suitable for discharge.     I have reviewed the nursing notes.    I have reviewed the findings, diagnosis, plan and need for follow up with the patient.       Medical Decision Making  The patient presented with a problem that is an acute illness with systemic symptoms.    The patient's evaluation  involved:  ordering and review of 3+ test(s) (see separate area of note for details)    The patient's management involved only simple and very low risk treatment.        Discharge Medication List as of 12/30/2022  7:27 PM          Final diagnoses:   Abdominal pain, epigastric   Gastroesophageal reflux disease     Disclaimer: This note consists of symbols derived from keyboarding, dictation and/or voice recognition software. As a result, there may be errors in the script that have gone undetected. Please consider this when interpreting information found in this chart.      12/30/2022   [facility] EMERGENCY DEPT     Francisco Javier Calabrese PA-C  12/30/22 1948

## 2022-12-30 NOTE — ED TRIAGE NOTES
Pt come sin with abdominal pain that started over a month ago. Also has nausea. Was seen in urgent care for same thing 2 days ago.

## 2022-12-31 NOTE — DISCHARGE INSTRUCTIONS
It was a pleasure working with you today!  I hope your condition improves rapidly!     Thankfully, all of your testing came out okay today.  I am concerned that your reflux is severely flaring.  You likely have some underlying gastritis, inflammation of the stomach, related to this.  Please take your omeprazole 20 mg twice daily.  It is best taken 20 minutes prior to a meal.  I also want you to take Pepcid, generic name is famotidine, 20 mg twice daily.  After 1 week of this regimen, you can decrease both of the medications down to once daily if you are doing well.  If you have flare of your symptoms, you can use liquid Maalox or liquid Gaviscon.  Please stay on a bland diet.  Limit the amount of caffeine that you consume.  Please call and schedule a consultation with general surgery for a week from now to recheck your condition and to provide further management recommendations based on your progress.

## 2023-01-02 ENCOUNTER — OFFICE VISIT (OUTPATIENT)
Dept: SURGERY | Facility: CLINIC | Age: 53
End: 2023-01-02
Payer: COMMERCIAL

## 2023-01-02 VITALS
SYSTOLIC BLOOD PRESSURE: 118 MMHG | HEIGHT: 69 IN | DIASTOLIC BLOOD PRESSURE: 72 MMHG | TEMPERATURE: 96.2 F | WEIGHT: 249 LBS | BODY MASS INDEX: 36.88 KG/M2

## 2023-01-02 DIAGNOSIS — R10.13 EPIGASTRIC PAIN: Primary | ICD-10-CM

## 2023-01-02 DIAGNOSIS — R11.0 NAUSEA: ICD-10-CM

## 2023-01-02 DIAGNOSIS — R93.5 ABNORMAL US (ULTRASOUND) OF ABDOMEN: ICD-10-CM

## 2023-01-02 PROCEDURE — 99244 OFF/OP CNSLTJ NEW/EST MOD 40: CPT | Performed by: SPECIALIST

## 2023-01-02 ASSESSMENT — PAIN SCALES - GENERAL: PAINLEVEL: MILD PAIN (3)

## 2023-01-02 NOTE — LETTER
1/2/2023         RE: Triny Bower  [address] [place] [address]        Dear Colleague,    Thank you for referring your patient, Triny Bower, to the [facility]. Please see a copy of my visit note below.    Consult requested by Francisco Javier lutz    Reason for consultation: Epigastric pain      HPI:  Patient is a 52-year-old white female 10 days before Thanksgiving developed epigastric pain she describes it as a fist under her sternum.  It occurs worse after every meal.  She was placed on a PPI by the ER with limited relief.  Denies any vomiting fevers or chills but does admit to morning nausea.  She is on multiple PPIs.  He states all meals cause the feeling.  There is no worsening with fatty foods.  She has never had an EGD or colonoscopy.  She had an ultrasound in the ER that did reveal a contracted gallbladder.  She now presents to me for evaluation of her epigastric pain and nausea.    Past Medical History:   Diagnosis Date     Displacement of lumbar intervertebral disc without myelopathy 01/20/10    Transfer to [facility]     Hyperlipidemia LDL goal <130 3/22/2018     Past Surgical History:   Procedure Laterality Date     BACK SURGERY       DILATE CERVIX, HYSTEROSCOPY, ABLATE ENDOMETRIUM NOVASURE, COMBINED N/A 7/31/2019    Procedure: Dilateand curretage cervix, hysteroscopy, ablate endometrium novasure, combined;  Surgeon: Viral Hodges MD;  Location:  OR     GENITOURINARY SURGERY  04/2018     HEMILAMINECTOMY, DISCECTOMY LUMBAR TWO LEVELS, COMBINED  8/22/2012    Procedure: COMBINED HEMILAMINECTOMY, DISCECTOMY LUMBAR TWO LEVELS;  Discectomy right Lumbar 4-Sacral 1, Hemilaminectomy bilateral Lumbar 4-Sacral 1;  Surgeon: Shree Martinez MD;  Location: [facility] OR     LAMINECT/DISCECTOMY, LUMBAR  01/20/10    L4-5     OPERATIVE HYSTEROSCOPY WITH MORCELLATOR N/A 7/31/2019    Procedure: Diagnostic hysteroscopy, myosure myomectomy;  Surgeon: Viral Hodges MD;   Location: [facility] OR     TUBAL LIGATION  1995     Current Outpatient Medications   Medication     Calcium Carbonate-Simethicone (TUMS GAS RELIEF CHEWY BITES) 750-80 MG CHEW     omeprazole (PRILOSEC OTC) 20 MG EC tablet     No current facility-administered medications for this visit.      No Known Allergies    Social History     Socioeconomic History     Marital status:      Spouse name: Not on file     Number of children: Not on file     Years of education: Not on file     Highest education level: Not on file   Occupational History     Not on file   Tobacco Use     Smoking status: Former     Packs/day: 0.00     Years: 10.00     Pack years: 0.00     Types: Cigarettes     Smokeless tobacco: Never   Vaping Use     Vaping Use: Never used   Substance and Sexual Activity     Alcohol use: Not Currently     Comment: Very Rare     Drug use: No     Sexual activity: Not Currently     Partners: Male     Birth control/protection: Female Surgical     Comment: not very often   Other Topics Concern     Parent/sibling w/ CABG, MI or angioplasty before 65F 55M? No   Social History Narrative     Not on file     Social Determinants of Health     Financial Resource Strain: Not on file   Food Insecurity: Not on file   Transportation Needs: Not on file   Physical Activity: Not on file   Stress: Not on file   Social Connections: Not on file   Intimate Partner Violence: Not on file   Housing Stability: Not on file     Family History   Problem Relation Age of Onset     Cancer Maternal Grandmother         cervical cancer     Gynecology Maternal Grandmother         cervical cancer     Arthritis Paternal Grandmother      Osteoporosis Paternal Grandmother      Breast Cancer Mother         70's     Hypertension Mother       ROS: 10 point ROS neg other than the symptoms noted above in the HPI.    PE:  B/P: 118/72, T: 96.2, P: Data Unavailable, R: Data Unavailable  General: well developed, well nourished WF  who appears their stated age  HEENT:  NC/AT, EOMI, (-)icterus, (-)injection  Neck: Supple, No JVD  Chest: CTA  Heart: S1, S2, (-)m/r/g  Abd: Soft, epigastric tenderness to deep palpation with diffuse minimal tenderness, non distended, non tender, no masses  Ext; Warm, no edema  Psych: AAOx3  Neuro: No focal deficits    Lab Results   Component Value Date    WBC 10.6 12/30/2022    WBC 5.4 07/31/2019     Lab Results   Component Value Date    RBC 4.59 12/30/2022    RBC 4.53 07/31/2019     Lab Results   Component Value Date    HGB 12.8 12/30/2022    HGB 12.7 07/31/2019     Lab Results   Component Value Date    HCT 38.8 12/30/2022    HCT 37.8 07/31/2019     No components found for: MCT  Lab Results   Component Value Date    MCV 85 12/30/2022    MCV 83 07/31/2019     Lab Results   Component Value Date    MCH 27.9 12/30/2022    MCH 28.0 07/31/2019     Lab Results   Component Value Date    MCHC 33.0 12/30/2022    MCHC 33.6 07/31/2019     Lab Results   Component Value Date    RDW 13.9 12/30/2022    RDW 14.0 07/31/2019     Lab Results   Component Value Date     12/30/2022     07/31/2019     Liver Function Studies - Recent Labs   Lab Test 12/30/22  1740   PROTTOTAL 7.1   ALBUMIN 3.4   BILITOTAL 0.2   ALKPHOS 45   AST 9   ALT 18     US    EXAM: US ABDOMEN LIMITED  LOCATION: [facility]  DATE/TIME: 12/30/2022 6:03 PM     INDICATION: RUQ epigastric pain and bloating.  COMPARISON: None.  TECHNIQUE: Limited abdominal ultrasound.     FINDINGS:     GALLBLADDER: Incompletely distended, exaggerating wall thickness. No echogenic gallstones. Negative ultrasonic Goodwin's sign.     BILE DUCTS: No biliary dilatation. The common duct measures 4 mm.     LIVER: Mildly echogenic liver parenchyma. No focal mass.     RIGHT KIDNEY: No hydronephrosis.     PANCREAS: The visualized portions are normal.     No ascites.                                                                      IMPRESSION:     1.  Incomplete gallbladder distention,  though no cholelithiasis identified.     2.  Mild hepatic steatosis.       Impression/plan:  This is a 52-year-old lady with epigastric pain made worse by meals of unclear etiology.  It does not appear to be responsive to a PPI.  She is also never had a colonoscopy.  As the gallbladder is contracted this may represent chronic cholecystitis.  After discussion with the patient the plan at this time is to obtain a HIDA scan and should that be normal proceed to CT scan abdomen pelvis and should that be normal proceed to EGD and colonoscopy.   The procedure, risks, benefits, and alternatives were discussed and the patient agrees to proceed with that plan.  She will call me once her imaging studies are complete.  She knows to go to the ER should her symptoms worsen.    Adryan Gaitan MD, FACS.        Again, thank you for allowing me to participate in the care of your patient.        Sincerely,        Adryan Gaitan MD

## 2023-01-02 NOTE — PROGRESS NOTES
Consult requested by Francisco Javier lutz    Reason for consultation: Epigastric pain      HPI:  Patient is a 52-year-old white female 10 days before Thanksgiving developed epigastric pain she describes it as a fist under her sternum.  It occurs worse after every meal.  She was placed on a PPI by the ER with limited relief.  Denies any vomiting fevers or chills but does admit to morning nausea.  She is on multiple PPIs.  He states all meals cause the feeling.  There is no worsening with fatty foods.  She has never had an EGD or colonoscopy.  She had an ultrasound in the ER that did reveal a contracted gallbladder.  She now presents to me for evaluation of her epigastric pain and nausea.    Past Medical History:   Diagnosis Date     Displacement of lumbar intervertebral disc without myelopathy 01/20/10    Transfer to [facility]     Hyperlipidemia LDL goal <130 3/22/2018     Past Surgical History:   Procedure Laterality Date     BACK SURGERY       DILATE CERVIX, HYSTEROSCOPY, ABLATE ENDOMETRIUM NOVASURE, COMBINED N/A 7/31/2019    Procedure: Dilateand curretage cervix, hysteroscopy, ablate endometrium novasure, combined;  Surgeon: Viral Hodges MD;  Location: [facility] OR     GENITOURINARY SURGERY  04/2018     HEMILAMINECTOMY, DISCECTOMY LUMBAR TWO LEVELS, COMBINED  8/22/2012    Procedure: COMBINED HEMILAMINECTOMY, DISCECTOMY LUMBAR TWO LEVELS;  Discectomy right Lumbar 4-Sacral 1, Hemilaminectomy bilateral Lumbar 4-Sacral 1;  Surgeon: Shree Martinez MD;  Location: [facility] OR     LAMINECT/DISCECTOMY, LUMBAR  01/20/10    L4-5     OPERATIVE HYSTEROSCOPY WITH MORCELLATOR N/A 7/31/2019    Procedure: Diagnostic hysteroscopy, myosure myomectomy;  Surgeon: Viral Hodges MD;  Location: [facility] OR     TUBAL LIGATION  1995     Current Outpatient Medications   Medication     Calcium Carbonate-Simethicone (TUMS GAS RELIEF CHEWY BITES) 750-80 MG CHEW     omeprazole (PRILOSEC OTC) 20 MG EC tablet     No current  facility-administered medications for this visit.      No Known Allergies    Social History     Socioeconomic History     Marital status:      Spouse name: Not on file     Number of children: Not on file     Years of education: Not on file     Highest education level: Not on file   Occupational History     Not on file   Tobacco Use     Smoking status: Former     Packs/day: 0.00     Years: 10.00     Pack years: 0.00     Types: Cigarettes     Smokeless tobacco: Never   Vaping Use     Vaping Use: Never used   Substance and Sexual Activity     Alcohol use: Not Currently     Comment: Very Rare     Drug use: No     Sexual activity: Not Currently     Partners: Male     Birth control/protection: Female Surgical     Comment: not very often   Other Topics Concern     Parent/sibling w/ CABG, MI or angioplasty before 65F 55M? No   Social History Narrative     Not on file     Social Determinants of Health     Financial Resource Strain: Not on file   Food Insecurity: Not on file   Transportation Needs: Not on file   Physical Activity: Not on file   Stress: Not on file   Social Connections: Not on file   Intimate Partner Violence: Not on file   Housing Stability: Not on file     Family History   Problem Relation Age of Onset     Cancer Maternal Grandmother         cervical cancer     Gynecology Maternal Grandmother         cervical cancer     Arthritis Paternal Grandmother      Osteoporosis Paternal Grandmother      Breast Cancer Mother         70's     Hypertension Mother       ROS: 10 point ROS neg other than the symptoms noted above in the HPI.    PE:  B/P: 118/72, T: 96.2, P: Data Unavailable, R: Data Unavailable  General: well developed, well nourished WF  who appears their stated age  HEENT: NC/AT, EOMI, (-)icterus, (-)injection  Neck: Supple, No JVD  Chest: CTA  Heart: S1, S2, (-)m/r/g  Abd: Soft, epigastric tenderness to deep palpation with diffuse minimal tenderness, non distended, non tender, no masses  Ext; Warm,  no edema  Psych: AAOx3  Neuro: No focal deficits    Lab Results   Component Value Date    WBC 10.6 12/30/2022    WBC 5.4 07/31/2019     Lab Results   Component Value Date    RBC 4.59 12/30/2022    RBC 4.53 07/31/2019     Lab Results   Component Value Date    HGB 12.8 12/30/2022    HGB 12.7 07/31/2019     Lab Results   Component Value Date    HCT 38.8 12/30/2022    HCT 37.8 07/31/2019     No components found for: MCT  Lab Results   Component Value Date    MCV 85 12/30/2022    MCV 83 07/31/2019     Lab Results   Component Value Date    MCH 27.9 12/30/2022    MCH 28.0 07/31/2019     Lab Results   Component Value Date    MCHC 33.0 12/30/2022    MCHC 33.6 07/31/2019     Lab Results   Component Value Date    RDW 13.9 12/30/2022    RDW 14.0 07/31/2019     Lab Results   Component Value Date     12/30/2022     07/31/2019     Liver Function Studies - Recent Labs   Lab Test 12/30/22  1740   PROTTOTAL 7.1   ALBUMIN 3.4   BILITOTAL 0.2   ALKPHOS 45   AST 9   ALT 18     US    EXAM: US ABDOMEN LIMITED  LOC[facility]  DATE/TIME: 12/30/2022 6:03 PM     INDICATION: RUQ epigastric pain and bloating.  COMPARISON: None.  TECHNIQUE: Limited abdominal ultrasound.     FINDINGS:     GALLBLADDER: Incompletely distended, exaggerating wall thickness. No echogenic gallstones. Negative ultrasonic Goodwin's sign.     BILE DUCTS: No biliary dilatation. The common duct measures 4 mm.     LIVER: Mildly echogenic liver parenchyma. No focal mass.     RIGHT KIDNEY: No hydronephrosis.     PANCREAS: The visualized portions are normal.     No ascites.                                                                      IMPRESSION:     1.  Incomplete gallbladder distention, though no cholelithiasis identified.     2.  Mild hepatic steatosis.       Impression/plan:  This is a 52-year-old lady with epigastric pain made worse by meals of unclear etiology.  It does not appear to be responsive to a PPI.  She  is also never had a colonoscopy.  As the gallbladder is contracted this may represent chronic cholecystitis.  After discussion with the patient the plan at this time is to obtain a HIDA scan and should that be normal proceed to CT scan abdomen pelvis and should that be normal proceed to EGD and colonoscopy.   The procedure, risks, benefits, and alternatives were discussed and the patient agrees to proceed with that plan.  She will call me once her imaging studies are complete.  She knows to go to the ER should her symptoms worsen.    Adryan Gaitan MD, FACS.

## 2023-01-03 ENCOUNTER — TELEPHONE (OUTPATIENT)
Dept: SURGERY | Facility: CLINIC | Age: 53
End: 2023-01-03

## 2023-01-03 NOTE — TELEPHONE ENCOUNTER
Per LOV notes Dr. Gaitan would like to see HIDA scan results before patient moves forwards with a CT. CT orders will be placed if HIDA scan results are normal. Patient was relayed this information and verbalized understanding.     Marika Jewell RN on 1/3/2023 at 9:23 AM

## 2023-01-03 NOTE — TELEPHONE ENCOUNTER
Reason for Call:  Other appointment    Detailed comments: pt needs order put in for CT that is scheduled for 01/11/23. Thank you    Phone Number Patient can be reached at: Other phone number:  *    Best Time: any    Can we leave a detailed message on this number? Not Applicable    Call taken on 1/3/2023 at 9:04 AM by Karuna Merino

## 2023-01-04 ENCOUNTER — HOSPITAL ENCOUNTER (OUTPATIENT)
Dept: NUCLEAR MEDICINE | Facility: CLINIC | Age: 53
Setting detail: NUCLEAR MEDICINE
Discharge: HOME | End: 2023-01-04
Attending: SPECIALIST | Admitting: SPECIALIST
Payer: COMMERCIAL

## 2023-01-04 DIAGNOSIS — R11.0 NAUSEA: ICD-10-CM

## 2023-01-04 DIAGNOSIS — R10.13 EPIGASTRIC PAIN: ICD-10-CM

## 2023-01-04 DIAGNOSIS — R93.5 ABNORMAL US (ULTRASOUND) OF ABDOMEN: ICD-10-CM

## 2023-01-04 PROCEDURE — 250N000011 HC RX IP 250 OP 636: Performed by: SPECIALIST

## 2023-01-04 PROCEDURE — 258N000003 HC RX IP 258 OP 636: Performed by: SPECIALIST

## 2023-01-04 PROCEDURE — 343N000001 HC RX 343: Performed by: SPECIALIST

## 2023-01-04 PROCEDURE — 78227 HEPATOBIL SYST IMAGE W/DRUG: CPT

## 2023-01-04 PROCEDURE — A9537 TC99M MEBROFENIN: HCPCS | Performed by: SPECIALIST

## 2023-01-04 RX ORDER — KIT FOR THE PREPARATION OF TECHNETIUM TC 99M MEBROFENIN 45 MG/10ML
5.3 INJECTION, POWDER, LYOPHILIZED, FOR SOLUTION INTRAVENOUS ONCE
Status: COMPLETED | OUTPATIENT
Start: 2023-01-04 | End: 2023-01-04

## 2023-01-04 RX ADMIN — SODIUM CHLORIDE 2.3 MCG: 9 INJECTION INTRAMUSCULAR; INTRAVENOUS; SUBCUTANEOUS at 10:00

## 2023-01-04 RX ADMIN — MEBROFENIN 5.3 MILLICURIE: 45 INJECTION, POWDER, LYOPHILIZED, FOR SOLUTION INTRAVENOUS at 09:00

## 2023-01-04 NOTE — TELEPHONE ENCOUNTER
I called pt and made her an appt for tomorrow.  Adryan Muniz MA, MD  You 4 minutes ago (12:18 PM)     RK  Please have her see me in clinic tomorrow - double book if needed.

## 2023-01-04 NOTE — TELEPHONE ENCOUNTER
Reviewed BARTOLO with patient CCK reproduced sx.  Recommended lap antonio.  She will discuss with her .  Will have her see me tomorrow in clinic

## 2023-01-05 ENCOUNTER — OFFICE VISIT (OUTPATIENT)
Dept: SURGERY | Facility: CLINIC | Age: 53
End: 2023-01-05
Payer: COMMERCIAL

## 2023-01-05 VITALS
HEIGHT: 69 IN | SYSTOLIC BLOOD PRESSURE: 122 MMHG | TEMPERATURE: 96.5 F | WEIGHT: 249 LBS | BODY MASS INDEX: 36.88 KG/M2 | DIASTOLIC BLOOD PRESSURE: 78 MMHG

## 2023-01-05 DIAGNOSIS — K81.1 CHRONIC CHOLECYSTITIS: Primary | ICD-10-CM

## 2023-01-05 PROCEDURE — 99213 OFFICE O/P EST LOW 20 MIN: CPT | Performed by: SPECIALIST

## 2023-01-05 ASSESSMENT — PAIN SCALES - GENERAL: PAINLEVEL: NO PAIN (0)

## 2023-01-05 NOTE — PROGRESS NOTES
F/U HIDA Scan        Subjective:  Patient is a 52-year-old white female 10 days before Thanksgiving developed epigastric pain she describes it as a fist under her sternum.  It occurs worse after every meal.  She was placed on a PPI by the ER with limited relief.  Denies any vomiting fevers or chills but does admit to morning nausea.  She is on multiple PPIs.  He states all meals cause the feeling.  There is no worsening with fatty foods.  She has never had an EGD or colonoscopy.  She had an ultrasound in the ER that did reveal a contracted gallbladder.     She had her HIDA scan for which she now follows up.  She also now reports that the pain is worsened and radiating to her right back.  The injection of CCK did reproduce her symptoms.        Objective:Neck: Supple, No JVD  Chest: CTA  Heart: S1, S2, (-)m/r/g  Abd: Soft, epigastric tenderness worse than last visit, non distended, non tender, no masses  Ext; Warm, no edema  Psych: AAOx3  Neuro: No focal deficits    Lab Results   Component Value Date    WBC 10.6 12/30/2022    WBC 5.4 07/31/2019     Lab Results   Component Value Date    RBC 4.59 12/30/2022    RBC 4.53 07/31/2019     Lab Results   Component Value Date    HGB 12.8 12/30/2022    HGB 12.7 07/31/2019     Lab Results   Component Value Date    HCT 38.8 12/30/2022    HCT 37.8 07/31/2019     No components found for: MCT  Lab Results   Component Value Date    MCV 85 12/30/2022    MCV 83 07/31/2019     Lab Results   Component Value Date    MCH 27.9 12/30/2022    MCH 28.0 07/31/2019     Lab Results   Component Value Date    MCHC 33.0 12/30/2022    MCHC 33.6 07/31/2019     Lab Results   Component Value Date    RDW 13.9 12/30/2022    RDW 14.0 07/31/2019     Lab Results   Component Value Date     12/30/2022     07/31/2019     Liver Function Studies - Recent Labs   Lab Test 12/30/22  1740   PROTTOTAL 7.1   ALBUMIN 3.4   BILITOTAL 0.2   ALKPHOS 45   AST 9   ALT 18     US    EXAM: US ABDOMEN  LIMITED  LOCATION: [facility]  DATE/TIME: 12/30/2022 6:03 PM     INDICATION: RUQ epigastric pain and bloating.  COMPARISON: None.  TECHNIQUE: Limited abdominal ultrasound.     FINDINGS:     GALLBLADDER: Incompletely distended, exaggerating wall thickness. No echogenic gallstones. Negative ultrasonic Goodwin's sign.     BILE DUCTS: No biliary dilatation. The common duct measures 4 mm.     LIVER: Mildly echogenic liver parenchyma. No focal mass.     RIGHT KIDNEY: No hydronephrosis.     PANCREAS: The visualized portions are normal.     No ascites.                                                                      IMPRESSION:     1.  Incomplete gallbladder distention, though no cholelithiasis identified.     2.  Mild hepatic steatosis.       HIDA -   NUCLEAR MEDICINE HEPATOBILIARY SCAN WITH GALLBLADDER EJECTION FRACTION    1/4/2023 10:56 AM      TECHNIQUE:  5.3 mCi of technetium 99m labeled Mebrofenin were  intravenously given while dynamically imaging the right upper abdomen.   Approximately one hour later, 2.3 mcg of cholecystokinin (CCK) was  intravenously given over 12 minutes while evaluating the gallbladder  ejection fraction.       HISTORY:  Patient with epigastric pain and contracted gallbladder and  ultrasound. Epigastric pain. Nausea. Abnormal US (ultrasound) of  abdomen.     COMPARISON:   Nuclear Study: None.     Other Relevant Studies: Limited abdominal ultrasound dated 12/3/2022.     FINDINGS:  There is normal, homogeneous uptake of radiotracer in the  liver.  The gallbladder is seen by the 10-minute image and the small  bowel is seen by the 61-minute image.     After the administration of CCK, a gallbladder ejection fraction of  24% was measured.  The patient described 6/10 severity abdominal pain  (sharper pain than typical symptoms) and 0/10 severity nausea with the  cholecystokinin infusion.                                                                         IMPRESSION:  1. No common bile or cystic duct obstruction is seen.   2. Abnormally low gallbladder ejection fraction. Gallbladder  dyskinesis can be associated with acalculus or chronic cholecystitis.  3. 6/10 severity abdominal pain (sharper pain than typical symptoms)  with cholecystokinin infusion.     NAVEEN SAMUELS MD          Assessment/plan:  This is a 52-year-old lady with epigastric pain made worse by meals that I now suspect is due to chronic cholecystitis with a possible acute component.  Her pain is worsened since she was last seen in our radiates to the right back.  She had an abnormally low HIDA scan with the injection of CCK reproducing her symptoms.  I discussed these findings with the patient she expressed understanding.  I discussed the role of cholecystectomy and she expressed understanding.  After discussion with the patient by this time is proceed to laparoscopic cholecystectomy.   The procedure, risks, benefits, and alternatives were discussed and the patient agrees to proceed.  She is quite miserable and like it done as soon as possible.  She will be scheduled for tomorrow.  Patient is cleared for general anesthesia.    Adryan Gaitan MD, FACS.

## 2023-01-05 NOTE — LETTER
1/5/2023         RE: Triny Bower  [address] [facility] [address]        Dear Colleague,    Thank you for referring your patient, Triny Bower, to the [facility]. Please see a copy of my visit note below.    F/U HIDA Scan        Subjective:  Patient is a 52-year-old white female 10 days before Thanksgiving developed epigastric pain she describes it as a fist under her sternum.  It occurs worse after every meal.  She was placed on a PPI by the ER with limited relief.  Denies any vomiting fevers or chills but does admit to morning nausea.  She is on multiple PPIs.  He states all meals cause the feeling.  There is no worsening with fatty foods.  She has never had an EGD or colonoscopy.  She had an ultrasound in the ER that did reveal a contracted gallbladder.     She had her HIDA scan for which she now follows up.  She also now reports that the pain is worsened and radiating to her right back.  The injection of CCK did reproduce her symptoms.        Objective:Neck: Supple, No JVD  Chest: CTA  Heart: S1, S2, (-)m/r/g  Abd: Soft, epigastric tenderness worse than last visit, non distended, non tender, no masses  Ext; Warm, no edema  Psych: AAOx3  Neuro: No focal deficits    Lab Results   Component Value Date    WBC 10.6 12/30/2022    WBC 5.4 07/31/2019     Lab Results   Component Value Date    RBC 4.59 12/30/2022    RBC 4.53 07/31/2019     Lab Results   Component Value Date    HGB 12.8 12/30/2022    HGB 12.7 07/31/2019     Lab Results   Component Value Date    HCT 38.8 12/30/2022    HCT 37.8 07/31/2019     No components found for: MCT  Lab Results   Component Value Date    MCV 85 12/30/2022    MCV 83 07/31/2019     Lab Results   Component Value Date    MCH 27.9 12/30/2022    MCH 28.0 07/31/2019     Lab Results   Component Value Date    MCHC 33.0 12/30/2022    MCHC 33.6 07/31/2019     Lab Results   Component Value Date    RDW 13.9 12/30/2022    RDW 14.0 07/31/2019     Lab Results    Component Value Date     12/30/2022     07/31/2019     Liver Function Studies - Recent Labs   Lab Test 12/30/22  1740   PROTTOTAL 7.1   ALBUMIN 3.4   BILITOTAL 0.2   ALKPHOS 45   AST 9   ALT 18     US    EXAM: US ABDOMEN LIMITED  LOCATION: [facility]  DATE/TIME: 12/30/2022 6:03 PM     INDICATION: RUQ epigastric pain and bloating.  COMPARISON: None.  TECHNIQUE: Limited abdominal ultrasound.     FINDINGS:     GALLBLADDER: Incompletely distended, exaggerating wall thickness. No echogenic gallstones. Negative ultrasonic Goodwin's sign.     BILE DUCTS: No biliary dilatation. The common duct measures 4 mm.     LIVER: Mildly echogenic liver parenchyma. No focal mass.     RIGHT KIDNEY: No hydronephrosis.     PANCREAS: The visualized portions are normal.     No ascites.                                                                      IMPRESSION:     1.  Incomplete gallbladder distention, though no cholelithiasis identified.     2.  Mild hepatic steatosis.       HIDA -   NUCLEAR MEDICINE HEPATOBILIARY SCAN WITH GALLBLADDER EJECTION FRACTION    1/4/2023 10:56 AM      TECHNIQUE:  5.3 mCi of technetium 99m labeled Mebrofenin were  intravenously given while dynamically imaging the right upper abdomen.   Approximately one hour later, 2.3 mcg of cholecystokinin (CCK) was  intravenously given over 12 minutes while evaluating the gallbladder  ejection fraction.       HISTORY:  Patient with epigastric pain and contracted gallbladder and  ultrasound. Epigastric pain. Nausea. Abnormal US (ultrasound) of  abdomen.     COMPARISON:   Nuclear Study: None.     Other Relevant Studies: Limited abdominal ultrasound dated 12/3/2022.     FINDINGS:  There is normal, homogeneous uptake of radiotracer in the  liver.  The gallbladder is seen by the 10-minute image and the small  bowel is seen by the 61-minute image.     After the administration of CCK, a gallbladder ejection fraction of  24% was  measured.  The patient described 6/10 severity abdominal pain  (sharper pain than typical symptoms) and 0/10 severity nausea with the  cholecystokinin infusion.                                                                        IMPRESSION:  1. No common bile or cystic duct obstruction is seen.   2. Abnormally low gallbladder ejection fraction. Gallbladder  dyskinesis can be associated with acalculus or chronic cholecystitis.  3. 6/10 severity abdominal pain (sharper pain than typical symptoms)  with cholecystokinin infusion.     NAVEEN SAMUELS MD          Assessment/plan:  This is a 52-year-old lady with epigastric pain made worse by meals that I now suspect is due to chronic cholecystitis with a possible acute component.  Her pain is worsened since she was last seen in our radiates to the right back.  She had an abnormally low HIDA scan with the injection of CCK reproducing her symptoms.  I discussed these findings with the patient she expressed understanding.  I discussed the role of cholecystectomy and she expressed understanding.  After discussion with the patient by this time is proceed to laparoscopic cholecystectomy.   The procedure, risks, benefits, and alternatives were discussed and the patient agrees to proceed.  She is quite miserable and like it done as soon as possible.  She will be scheduled for tomorrow.  Patient is cleared for general anesthesia.    Adryan Gaitan MD, FACS.            Again, thank you for allowing me to participate in the care of your patient.        Sincerely,        Adryan Gaitan MD

## 2023-01-05 NOTE — H&P (VIEW-ONLY)
F/U HIDA Scan        Subjective:  Patient is a 52-year-old white female 10 days before Thanksgiving developed epigastric pain she describes it as a fist under her sternum.  It occurs worse after every meal.  She was placed on a PPI by the ER with limited relief.  Denies any vomiting fevers or chills but does admit to morning nausea.  She is on multiple PPIs.  He states all meals cause the feeling.  There is no worsening with fatty foods.  She has never had an EGD or colonoscopy.  She had an ultrasound in the ER that did reveal a contracted gallbladder.     She had her HIDA scan for which she now follows up.  She also now reports that the pain is worsened and radiating to her right back.  The injection of CCK did reproduce her symptoms.        Objective:Neck: Supple, No JVD  Chest: CTA  Heart: S1, S2, (-)m/r/g  Abd: Soft, epigastric tenderness worse than last visit, non distended, non tender, no masses  Ext; Warm, no edema  Psych: AAOx3  Neuro: No focal deficits    Lab Results   Component Value Date    WBC 10.6 12/30/2022    WBC 5.4 07/31/2019     Lab Results   Component Value Date    RBC 4.59 12/30/2022    RBC 4.53 07/31/2019     Lab Results   Component Value Date    HGB 12.8 12/30/2022    HGB 12.7 07/31/2019     Lab Results   Component Value Date    HCT 38.8 12/30/2022    HCT 37.8 07/31/2019     No components found for: MCT  Lab Results   Component Value Date    MCV 85 12/30/2022    MCV 83 07/31/2019     Lab Results   Component Value Date    MCH 27.9 12/30/2022    MCH 28.0 07/31/2019     Lab Results   Component Value Date    MCHC 33.0 12/30/2022    MCHC 33.6 07/31/2019     Lab Results   Component Value Date    RDW 13.9 12/30/2022    RDW 14.0 07/31/2019     Lab Results   Component Value Date     12/30/2022     07/31/2019     Liver Function Studies - Recent Labs   Lab Test 12/30/22  1740   PROTTOTAL 7.1   ALBUMIN 3.4   BILITOTAL 0.2   ALKPHOS 45   AST 9   ALT 18     US    EXAM: US ABDOMEN  LIMITED  LOCATION: [facility]  DATE/TIME: 12/30/2022 6:03 PM     INDICATION: RUQ epigastric pain and bloating.  COMPARISON: None.  TECHNIQUE: Limited abdominal ultrasound.     FINDINGS:     GALLBLADDER: Incompletely distended, exaggerating wall thickness. No echogenic gallstones. Negative ultrasonic Goodwin's sign.     BILE DUCTS: No biliary dilatation. The common duct measures 4 mm.     LIVER: Mildly echogenic liver parenchyma. No focal mass.     RIGHT KIDNEY: No hydronephrosis.     PANCREAS: The visualized portions are normal.     No ascites.                                                                      IMPRESSION:     1.  Incomplete gallbladder distention, though no cholelithiasis identified.     2.  Mild hepatic steatosis.       HIDA -   NUCLEAR MEDICINE HEPATOBILIARY SCAN WITH GALLBLADDER EJECTION FRACTION    1/4/2023 10:56 AM      TECHNIQUE:  5.3 mCi of technetium 99m labeled Mebrofenin were  intravenously given while dynamically imaging the right upper abdomen.   Approximately one hour later, 2.3 mcg of cholecystokinin (CCK) was  intravenously given over 12 minutes while evaluating the gallbladder  ejection fraction.       HISTORY:  Patient with epigastric pain and contracted gallbladder and  ultrasound. Epigastric pain. Nausea. Abnormal US (ultrasound) of  abdomen.     COMPARISON:   Nuclear Study: None.     Other Relevant Studies: Limited abdominal ultrasound dated 12/3/2022.     FINDINGS:  There is normal, homogeneous uptake of radiotracer in the  liver.  The gallbladder is seen by the 10-minute image and the small  bowel is seen by the 61-minute image.     After the administration of CCK, a gallbladder ejection fraction of  24% was measured.  The patient described 6/10 severity abdominal pain  (sharper pain than typical symptoms) and 0/10 severity nausea with the  cholecystokinin infusion.                                                                         IMPRESSION:  1. No common bile or cystic duct obstruction is seen.   2. Abnormally low gallbladder ejection fraction. Gallbladder  dyskinesis can be associated with acalculus or chronic cholecystitis.  3. 6/10 severity abdominal pain (sharper pain than typical symptoms)  with cholecystokinin infusion.     NAVEEN SAMUELS MD          Assessment/plan:  This is a 52-year-old lady with epigastric pain made worse by meals that I now suspect is due to chronic cholecystitis with a possible acute component.  Her pain is worsened since she was last seen in our radiates to the right back.  She had an abnormally low HIDA scan with the injection of CCK reproducing her symptoms.  I discussed these findings with the patient she expressed understanding.  I discussed the role of cholecystectomy and she expressed understanding.  After discussion with the patient by this time is proceed to laparoscopic cholecystectomy.   The procedure, risks, benefits, and alternatives were discussed and the patient agrees to proceed.  She is quite miserable and like it done as soon as possible.  She will be scheduled for tomorrow.  Patient is cleared for general anesthesia.    Adryan Gaitna MD, FACS.

## 2023-01-06 ENCOUNTER — HOSPITAL ENCOUNTER (OUTPATIENT)
Facility: CLINIC | Age: 53
Discharge: HOME | End: 2023-01-06
Attending: SPECIALIST | Admitting: SPECIALIST
Payer: COMMERCIAL

## 2023-01-06 ENCOUNTER — ANESTHESIA EVENT (OUTPATIENT)
Dept: SURGERY | Facility: CLINIC | Age: 53
End: 2023-01-06
Payer: COMMERCIAL

## 2023-01-06 ENCOUNTER — ANESTHESIA (OUTPATIENT)
Dept: SURGERY | Facility: CLINIC | Age: 53
End: 2023-01-06
Payer: COMMERCIAL

## 2023-01-06 VITALS
SYSTOLIC BLOOD PRESSURE: 135 MMHG | OXYGEN SATURATION: 97 % | HEART RATE: 62 BPM | DIASTOLIC BLOOD PRESSURE: 82 MMHG | TEMPERATURE: 97.5 F | RESPIRATION RATE: 14 BRPM

## 2023-01-06 DIAGNOSIS — R11.0 POSTOPERATIVE NAUSEA: Primary | ICD-10-CM

## 2023-01-06 DIAGNOSIS — G89.18 POST-OP PAIN: ICD-10-CM

## 2023-01-06 DIAGNOSIS — Z98.890 POSTOPERATIVE NAUSEA: Primary | ICD-10-CM

## 2023-01-06 PROCEDURE — 250N000009 HC RX 250: Performed by: SPECIALIST

## 2023-01-06 PROCEDURE — 250N000013 HC RX MED GY IP 250 OP 250 PS 637: Performed by: SPECIALIST

## 2023-01-06 PROCEDURE — 250N000011 HC RX IP 250 OP 636: Performed by: NURSE ANESTHETIST, CERTIFIED REGISTERED

## 2023-01-06 PROCEDURE — 250N000011 HC RX IP 250 OP 636: Performed by: SPECIALIST

## 2023-01-06 PROCEDURE — 360N000076 HC SURGERY LEVEL 3, PER MIN: Performed by: SPECIALIST

## 2023-01-06 PROCEDURE — 250N000026 HC DESFLURANE, PER MIN: Performed by: SPECIALIST

## 2023-01-06 PROCEDURE — 93005 ELECTROCARDIOGRAM TRACING: CPT | Mod: XU

## 2023-01-06 PROCEDURE — 710N000012 HC RECOVERY PHASE 2, PER MINUTE: Performed by: SPECIALIST

## 2023-01-06 PROCEDURE — 250N000009 HC RX 250: Performed by: NURSE ANESTHETIST, CERTIFIED REGISTERED

## 2023-01-06 PROCEDURE — 258N000003 HC RX IP 258 OP 636: Performed by: NURSE ANESTHETIST, CERTIFIED REGISTERED

## 2023-01-06 PROCEDURE — 370N000017 HC ANESTHESIA TECHNICAL FEE, PER MIN: Performed by: SPECIALIST

## 2023-01-06 PROCEDURE — 999N000141 HC STATISTIC PRE-PROCEDURE NURSING ASSESSMENT: Performed by: SPECIALIST

## 2023-01-06 PROCEDURE — 88304 TISSUE EXAM BY PATHOLOGIST: CPT | Mod: TC | Performed by: SPECIALIST

## 2023-01-06 PROCEDURE — 710N000011 HC RECOVERY PHASE 1, LEVEL 3, PER MIN: Performed by: SPECIALIST

## 2023-01-06 PROCEDURE — 88304 TISSUE EXAM BY PATHOLOGIST: CPT | Mod: 26 | Performed by: PATHOLOGY

## 2023-01-06 PROCEDURE — 272N000001 HC OR GENERAL SUPPLY STERILE: Performed by: SPECIALIST

## 2023-01-06 PROCEDURE — 47562 LAPAROSCOPIC CHOLECYSTECTOMY: CPT | Performed by: SPECIALIST

## 2023-01-06 RX ORDER — FENTANYL CITRATE 50 UG/ML
INJECTION, SOLUTION INTRAMUSCULAR; INTRAVENOUS PRN
Status: DISCONTINUED | OUTPATIENT
Start: 2023-01-06 | End: 2023-01-06

## 2023-01-06 RX ORDER — OXYCODONE AND ACETAMINOPHEN 5; 325 MG/1; MG/1
2 TABLET ORAL
Status: COMPLETED | OUTPATIENT
Start: 2023-01-06 | End: 2023-01-06

## 2023-01-06 RX ORDER — FENTANYL CITRATE 50 UG/ML
25 INJECTION, SOLUTION INTRAMUSCULAR; INTRAVENOUS
Status: DISCONTINUED | OUTPATIENT
Start: 2023-01-06 | End: 2023-01-06 | Stop reason: HOSPADM

## 2023-01-06 RX ORDER — OXYCODONE HYDROCHLORIDE 5 MG/1
5-10 TABLET ORAL EVERY 6 HOURS PRN
Qty: 10 TABLET | Refills: 0 | Status: SHIPPED | OUTPATIENT
Start: 2023-01-06 | End: 2023-03-14

## 2023-01-06 RX ORDER — ONDANSETRON 4 MG/1
4 TABLET, ORALLY DISINTEGRATING ORAL EVERY 30 MIN PRN
Status: DISCONTINUED | OUTPATIENT
Start: 2023-01-06 | End: 2023-01-06 | Stop reason: HOSPADM

## 2023-01-06 RX ORDER — CEFAZOLIN SODIUM/WATER 2 G/20 ML
2 SYRINGE (ML) INTRAVENOUS
Status: COMPLETED | OUTPATIENT
Start: 2023-01-06 | End: 2023-01-06

## 2023-01-06 RX ORDER — LIDOCAINE 40 MG/G
CREAM TOPICAL
Status: DISCONTINUED | OUTPATIENT
Start: 2023-01-06 | End: 2023-01-06 | Stop reason: HOSPADM

## 2023-01-06 RX ORDER — HYDROMORPHONE HYDROCHLORIDE 1 MG/ML
0.2 INJECTION, SOLUTION INTRAMUSCULAR; INTRAVENOUS; SUBCUTANEOUS EVERY 5 MIN PRN
Status: DISCONTINUED | OUTPATIENT
Start: 2023-01-06 | End: 2023-01-06 | Stop reason: HOSPADM

## 2023-01-06 RX ORDER — HYDROMORPHONE HYDROCHLORIDE 1 MG/ML
0.4 INJECTION, SOLUTION INTRAMUSCULAR; INTRAVENOUS; SUBCUTANEOUS EVERY 5 MIN PRN
Status: DISCONTINUED | OUTPATIENT
Start: 2023-01-06 | End: 2023-01-06 | Stop reason: HOSPADM

## 2023-01-06 RX ORDER — SODIUM CHLORIDE, SODIUM LACTATE, POTASSIUM CHLORIDE, CALCIUM CHLORIDE 600; 310; 30; 20 MG/100ML; MG/100ML; MG/100ML; MG/100ML
INJECTION, SOLUTION INTRAVENOUS CONTINUOUS
Status: DISCONTINUED | OUTPATIENT
Start: 2023-01-06 | End: 2023-01-06 | Stop reason: HOSPADM

## 2023-01-06 RX ORDER — FENTANYL CITRATE 50 UG/ML
50 INJECTION, SOLUTION INTRAMUSCULAR; INTRAVENOUS EVERY 5 MIN PRN
Status: DISCONTINUED | OUTPATIENT
Start: 2023-01-06 | End: 2023-01-06 | Stop reason: HOSPADM

## 2023-01-06 RX ORDER — METHOCARBAMOL 500 MG/1
500 TABLET, FILM COATED ORAL ONCE
Status: COMPLETED | OUTPATIENT
Start: 2023-01-06 | End: 2023-01-06

## 2023-01-06 RX ORDER — DIMENHYDRINATE 50 MG/ML
INJECTION, SOLUTION INTRAMUSCULAR; INTRAVENOUS PRN
Status: DISCONTINUED | OUTPATIENT
Start: 2023-01-06 | End: 2023-01-06

## 2023-01-06 RX ORDER — SODIUM CHLORIDE, SODIUM LACTATE, POTASSIUM CHLORIDE, CALCIUM CHLORIDE 600; 310; 30; 20 MG/100ML; MG/100ML; MG/100ML; MG/100ML
INJECTION, SOLUTION INTRAVENOUS CONTINUOUS
Status: DISCONTINUED | OUTPATIENT
Start: 2023-01-06 | End: 2023-01-06

## 2023-01-06 RX ORDER — GLYCOPYRROLATE 0.2 MG/ML
INJECTION, SOLUTION INTRAMUSCULAR; INTRAVENOUS PRN
Status: DISCONTINUED | OUTPATIENT
Start: 2023-01-06 | End: 2023-01-06

## 2023-01-06 RX ORDER — MEPERIDINE HYDROCHLORIDE 25 MG/ML
12.5 INJECTION INTRAMUSCULAR; INTRAVENOUS; SUBCUTANEOUS
Status: DISCONTINUED | OUTPATIENT
Start: 2023-01-06 | End: 2023-01-06 | Stop reason: HOSPADM

## 2023-01-06 RX ORDER — SODIUM CHLORIDE, SODIUM LACTATE, POTASSIUM CHLORIDE, CALCIUM CHLORIDE 600; 310; 30; 20 MG/100ML; MG/100ML; MG/100ML; MG/100ML
INJECTION, SOLUTION INTRAVENOUS CONTINUOUS PRN
Status: DISCONTINUED | OUTPATIENT
Start: 2023-01-06 | End: 2023-01-06

## 2023-01-06 RX ORDER — DEXAMETHASONE SODIUM PHOSPHATE 10 MG/ML
INJECTION, SOLUTION INTRAMUSCULAR; INTRAVENOUS PRN
Status: DISCONTINUED | OUTPATIENT
Start: 2023-01-06 | End: 2023-01-06

## 2023-01-06 RX ORDER — ONDANSETRON 2 MG/ML
4 INJECTION INTRAMUSCULAR; INTRAVENOUS EVERY 30 MIN PRN
Status: DISCONTINUED | OUTPATIENT
Start: 2023-01-06 | End: 2023-01-06 | Stop reason: HOSPADM

## 2023-01-06 RX ORDER — ONDANSETRON 2 MG/ML
INJECTION INTRAMUSCULAR; INTRAVENOUS PRN
Status: DISCONTINUED | OUTPATIENT
Start: 2023-01-06 | End: 2023-01-06

## 2023-01-06 RX ORDER — DIMENHYDRINATE 50 MG/ML
25 INJECTION, SOLUTION INTRAMUSCULAR; INTRAVENOUS
Status: DISCONTINUED | OUTPATIENT
Start: 2023-01-06 | End: 2023-01-06 | Stop reason: HOSPADM

## 2023-01-06 RX ORDER — ONDANSETRON 4 MG/1
4 TABLET, ORALLY DISINTEGRATING ORAL EVERY 6 HOURS PRN
Qty: 10 TABLET | Refills: 1 | Status: SHIPPED | OUTPATIENT
Start: 2023-01-06 | End: 2023-03-14

## 2023-01-06 RX ORDER — LIDOCAINE HYDROCHLORIDE 20 MG/ML
INJECTION, SOLUTION INFILTRATION; PERINEURAL PRN
Status: DISCONTINUED | OUTPATIENT
Start: 2023-01-06 | End: 2023-01-06

## 2023-01-06 RX ORDER — CEFAZOLIN SODIUM/WATER 2 G/20 ML
2 SYRINGE (ML) INTRAVENOUS SEE ADMIN INSTRUCTIONS
Status: DISCONTINUED | OUTPATIENT
Start: 2023-01-06 | End: 2023-01-06 | Stop reason: HOSPADM

## 2023-01-06 RX ORDER — BUPIVACAINE HYDROCHLORIDE AND EPINEPHRINE 2.5; 5 MG/ML; UG/ML
INJECTION, SOLUTION INFILTRATION; PERINEURAL PRN
Status: DISCONTINUED | OUTPATIENT
Start: 2023-01-06 | End: 2023-01-06 | Stop reason: HOSPADM

## 2023-01-06 RX ORDER — KETOROLAC TROMETHAMINE 30 MG/ML
INJECTION, SOLUTION INTRAMUSCULAR; INTRAVENOUS PRN
Status: DISCONTINUED | OUTPATIENT
Start: 2023-01-06 | End: 2023-01-06

## 2023-01-06 RX ORDER — FENTANYL CITRATE 50 UG/ML
25 INJECTION, SOLUTION INTRAMUSCULAR; INTRAVENOUS EVERY 5 MIN PRN
Status: DISCONTINUED | OUTPATIENT
Start: 2023-01-06 | End: 2023-01-06 | Stop reason: HOSPADM

## 2023-01-06 RX ORDER — PROPOFOL 10 MG/ML
INJECTION, EMULSION INTRAVENOUS PRN
Status: DISCONTINUED | OUTPATIENT
Start: 2023-01-06 | End: 2023-01-06

## 2023-01-06 RX ADMIN — SODIUM CHLORIDE, POTASSIUM CHLORIDE, SODIUM LACTATE AND CALCIUM CHLORIDE: 600; 310; 30; 20 INJECTION, SOLUTION INTRAVENOUS at 14:44

## 2023-01-06 RX ADMIN — GLYCOPYRROLATE 0.2 MCG: 0.2 INJECTION, SOLUTION INTRAMUSCULAR; INTRAVENOUS at 16:34

## 2023-01-06 RX ADMIN — Medication 2 G: at 15:59

## 2023-01-06 RX ADMIN — DIMENHYDRINATE 25 MG: 50 INJECTION, SOLUTION INTRAMUSCULAR; INTRAVENOUS at 16:57

## 2023-01-06 RX ADMIN — FENTANYL CITRATE 50 MCG: 50 INJECTION, SOLUTION INTRAMUSCULAR; INTRAVENOUS at 16:18

## 2023-01-06 RX ADMIN — FENTANYL CITRATE 25 MCG: 50 INJECTION, SOLUTION INTRAMUSCULAR; INTRAVENOUS at 17:36

## 2023-01-06 RX ADMIN — SUGAMMADEX 200 MG: 100 INJECTION, SOLUTION INTRAVENOUS at 17:01

## 2023-01-06 RX ADMIN — DEXAMETHASONE SODIUM PHOSPHATE 5 MG: 10 INJECTION, SOLUTION INTRAMUSCULAR; INTRAVENOUS at 16:18

## 2023-01-06 RX ADMIN — ROCURONIUM BROMIDE 50 MG: 50 INJECTION, SOLUTION INTRAVENOUS at 16:03

## 2023-01-06 RX ADMIN — ROCURONIUM BROMIDE 10 MG: 50 INJECTION, SOLUTION INTRAVENOUS at 16:43

## 2023-01-06 RX ADMIN — HYDROMORPHONE HYDROCHLORIDE 0.5 MG: 1 INJECTION, SOLUTION INTRAMUSCULAR; INTRAVENOUS; SUBCUTANEOUS at 17:01

## 2023-01-06 RX ADMIN — MIDAZOLAM 2 MG: 1 INJECTION INTRAMUSCULAR; INTRAVENOUS at 15:57

## 2023-01-06 RX ADMIN — ONDANSETRON 4 MG: 2 INJECTION INTRAMUSCULAR; INTRAVENOUS at 17:00

## 2023-01-06 RX ADMIN — KETOROLAC TROMETHAMINE 30 MG: 30 INJECTION, SOLUTION INTRAMUSCULAR at 16:55

## 2023-01-06 RX ADMIN — LIDOCAINE HYDROCHLORIDE 50 MG: 20 INJECTION, SOLUTION INFILTRATION; PERINEURAL at 16:03

## 2023-01-06 RX ADMIN — SODIUM CHLORIDE, POTASSIUM CHLORIDE, SODIUM LACTATE AND CALCIUM CHLORIDE: 600; 310; 30; 20 INJECTION, SOLUTION INTRAVENOUS at 16:56

## 2023-01-06 RX ADMIN — SODIUM CHLORIDE, POTASSIUM CHLORIDE, SODIUM LACTATE AND CALCIUM CHLORIDE: 600; 310; 30; 20 INJECTION, SOLUTION INTRAVENOUS at 15:59

## 2023-01-06 RX ADMIN — FENTANYL CITRATE 50 MCG: 50 INJECTION, SOLUTION INTRAMUSCULAR; INTRAVENOUS at 17:09

## 2023-01-06 RX ADMIN — PROPOFOL 200 MG: 10 INJECTION, EMULSION INTRAVENOUS at 16:03

## 2023-01-06 RX ADMIN — OXYCODONE HYDROCHLORIDE AND ACETAMINOPHEN 1 TABLET: 5; 325 TABLET ORAL at 18:36

## 2023-01-06 RX ADMIN — FENTANYL CITRATE 50 MCG: 50 INJECTION, SOLUTION INTRAMUSCULAR; INTRAVENOUS at 15:59

## 2023-01-06 RX ADMIN — FENTANYL CITRATE 50 MCG: 50 INJECTION, SOLUTION INTRAMUSCULAR; INTRAVENOUS at 17:04

## 2023-01-06 ASSESSMENT — ACTIVITIES OF DAILY LIVING (ADL)
ADLS_ACUITY_SCORE: 35

## 2023-01-06 NOTE — BRIEF OP NOTE
[facility]    Brief Operative Note    Pre-operative diagnosis: Chronic cholecystitis [K81.1]  Post-operative diagnosis acute on chronic cholecystitis    Procedure: Procedure(s):  CHOLECYSTECTOMY, LAPAROSCOPIC  Surgeon: Surgeon(s) and Role:     * Adryan Gaitan MD - Primary  Anesthesia: General   Estimated Blood Loss: Less than 10 ml    Drains: None  Specimens:   ID Type Source Tests Collected by Time Destination   1 : Gallbladder and COntents Tissue Gallbladder SURGICAL PATHOLOGY EXAM Adryan Gaitan MD 1/6/2023  4:22 PM      Findings:   Thickened GB with edema.  Complications: None.  Implants: * No implants in log *     Adryan Gaitan MD, FACS    #[id number]

## 2023-01-06 NOTE — ANESTHESIA PROCEDURE NOTES
Airway       Patient location during procedure: OR       Procedure Start/Stop Times: 1/6/2023 4:06 PM  Staff -        CRNA: Harsha Du APRN CRNA       Performed By: CRNA  Consent for Airway        Urgency: elective  Indications and Patient Condition       Indications for airway management: yevgeniy-procedural       Induction type:intravenous       Mask difficulty assessment: 1 - vent by mask    Final Airway Details       Final airway type: endotracheal airway       Successful airway: ETT - single  Endotracheal Airway Details        ETT size (mm): 7.0       Cuffed: yes       Successful intubation technique: direct laryngoscopy       DL Blade Type: MAC 3       Grade View of Cords: 1       Adjucts: stylet       Position: Right       Measured from: lips       Bite block used: Oral Airway    Post intubation assessment        Placement verified by: capnometry, equal breath sounds and chest rise        Number of attempts at approach: 1       Number of other approaches attempted: 0       Secured with: plastic tape       Ease of procedure: easy       Dentition: Intact and Unchanged    Medication(s) Administered   Medication Administration Time: 1/6/2023 4:06 PM

## 2023-01-06 NOTE — OR NURSING
Received report from Rosi Davis, OR Uday Cheung CRNA to assume care of pt in phase I.  Upon extubation, pt was screaming and writhing r/t right shoulder pain. Sharp localized pain to front of shoulder, which was later described as shooting down the back of her arm. CRNA gave 100 of fentanyl and 0.5 of dilaudid, with no relief. Tele rhythm appeared normal.  EKG performed, as well, showing nothing significant. Pt later reported that she had right shoulder pain as a baseline, intending to have that treated soon.  Warm pack and positioning were attempted.  Pain decreased after receiving another 25 mcg of fentanyl.  Robaxin order also available, if needed.  Will continue to monitor.

## 2023-01-06 NOTE — ANESTHESIA CARE TRANSFER NOTE
Patient: Triny Bower    Procedure: Procedure(s):  CHOLECYSTECTOMY, LAPAROSCOPIC       Diagnosis: Chronic cholecystitis [K81.1]  Diagnosis Additional Information: No value filed.    Anesthesia Type:   General     Note:    Oropharynx: oropharynx clear of all foreign objects and spontaneously breathing  Level of Consciousness: drowsy  Oxygen Supplementation: face mask    Independent Airway: airway patency satisfactory and stable  Dentition: dentition unchanged  Vital Signs Stable: post-procedure vital signs reviewed and stable  Report to RN Given: handoff report given  Patient transferred to: PACU    Handoff Report: Identifed the Patient, Identified the Reponsible Provider, Reviewed the pertinent medical history, Discussed the surgical course, Reviewed Intra-OP anesthesia mangement and issues during anesthesia, Set expectations for post-procedure period and Allowed opportunity for questions and acknowledgement of understanding      Vitals:  Vitals Value Taken Time   /92 01/06/23 1711   Temp     Pulse 86 01/06/23 1716   Resp 14 01/06/23 1716   SpO2 100 % 01/06/23 1716   Vitals shown include unvalidated device data.    Electronically Signed By: BINU Liao CRNA  January 6, 2023  5:17 PM

## 2023-01-06 NOTE — ANESTHESIA PREPROCEDURE EVALUATION
Anesthesia Pre-Procedure Evaluation    Patient: Triny Bower   MRN: [id number] : 1970        Procedure : Procedure(s):  CHOLECYSTECTOMY, LAPAROSCOPIC          Past Medical History:   Diagnosis Date     Displacement of lumbar intervertebral disc without myelopathy 01/20/10    Transfer to [facility]     Hyperlipidemia LDL goal <130 3/22/2018      Past Surgical History:   Procedure Laterality Date     BACK SURGERY       DILATE CERVIX, HYSTEROSCOPY, ABLATE ENDOMETRIUM NOVASURE, COMBINED N/A 2019    Procedure: Dilateand curretage cervix, hysteroscopy, ablate endometrium novasure, combined;  Surgeon: Viral Hodges MD;  Location: [facility] OR     GENITOURINARY SURGERY  2018     HEMILAMINECTOMY, DISCECTOMY LUMBAR TWO LEVELS, COMBINED  2012    Procedure: COMBINED HEMILAMINECTOMY, DISCECTOMY LUMBAR TWO LEVELS;  Discectomy right Lumbar 4-Sacral 1, Hemilaminectomy bilateral Lumbar 4-Sacral 1;  Surgeon: Shree Martinez MD;  Location: [facility] OR     LAMINECT/DISCECTOMY, LUMBAR  01/20/10    L4-5     OPERATIVE HYSTEROSCOPY WITH MORCELLATOR N/A 2019    Procedure: Diagnostic hysteroscopy, myosure myomectomy;  Surgeon: Viral Hodges MD;  Location: [facility] OR     TUBAL LIGATION        No Known Allergies   Social History     Tobacco Use     Smoking status: Former     Packs/day: 0.00     Years: 10.00     Pack years: 0.00     Types: Cigarettes     Smokeless tobacco: Never   Substance Use Topics     Alcohol use: Not Currently     Comment: Very Rare      Wt Readings from Last 1 Encounters:   23 112.9 kg (249 lb)        Anesthesia Evaluation   Pt has had prior anesthetic. Type: General.        ROS/MED HX  ENT/Pulmonary:  - neg pulmonary ROS     Neurologic:  - neg neurologic ROS     Cardiovascular:     (+) -----Previous cardiac testing   Echo: Date: Results:    Stress Test: Date: Results:    ECG Reviewed: Date: 2022 Results:  SR  Cath: Date: Results:      METS/Exercise Tolerance:      Hematologic:  - neg hematologic  ROS     Musculoskeletal: Comment: Herniated nucleus pulposus, L5-S1, right    Lumbar radiculopathy    Tendinitis of left wrist    Right shoulder pain      GI/Hepatic: Comment: Constipation, unspecified constipation type    (+) GERD, Asymptomatic on medication,     Renal/Genitourinary:  - neg Renal ROS     Endo:     (+) Obesity,     Psychiatric/Substance Use:     (+) psychiatric history depression     Infectious Disease:  - neg infectious disease ROS     Malignancy:  - neg malignancy ROS     Other:  - neg other ROS          Physical Exam    Airway  airway exam normal      Mallampati: II   TM distance: > 3 FB   Neck ROM: full   Mouth opening: > 3 cm    Respiratory Devices and Support         Dental  no notable dental history         Cardiovascular   cardiovascular exam normal       Rhythm and rate: regular and normal     Pulmonary   pulmonary exam normal        breath sounds clear to auscultation           OUTSIDE LABS:  CBC:   Lab Results   Component Value Date    WBC 10.6 12/30/2022    WBC 5.4 07/31/2019    HGB 12.8 12/30/2022    HGB 12.7 07/31/2019    HCT 38.8 12/30/2022    HCT 37.8 07/31/2019     12/30/2022     07/31/2019     BMP:   Lab Results   Component Value Date     12/30/2022     07/12/2019    POTASSIUM 3.8 12/30/2022    POTASSIUM 3.9 07/12/2019    CHLORIDE 109 12/30/2022    CHLORIDE 105 07/12/2019    CO2 26 12/30/2022    CO2 27 07/12/2019    BUN 14 12/30/2022    BUN 12 07/12/2019    CR 0.86 12/30/2022    CR 0.92 07/12/2019    GLC 83 12/30/2022    GLC 84 07/08/2021     COAGS:   Lab Results   Component Value Date    INR 0.96 01/21/2010     POC:   Lab Results   Component Value Date    HCG Negative 08/08/2012     HEPATIC:   Lab Results   Component Value Date    ALBUMIN 3.4 12/30/2022    PROTTOTAL 7.1 12/30/2022    ALT 18 12/30/2022    AST 9 12/30/2022    ALKPHOS 45 12/30/2022    BILITOTAL 0.2 12/30/2022     OTHER:   Lab Results   Component Value Date     KAMARI 8.7 12/30/2022    PHOS 3.1 01/21/2010    MAG 2.1 01/21/2010    LIPASE 171 12/30/2022    TSH 1.67 07/08/2021    SED 47 (H) 01/19/2010       Anesthesia Plan    ASA Status:  3   NPO Status:  NPO Appropriate    Anesthesia Type: General.     - Airway: ETT   Induction: Intravenous, Propofol.   Maintenance: Balanced.   Techniques and Equipment:     - Airway: Video-Laryngoscope         Consents    Anesthesia Plan(s) and associated risks, benefits, and realistic alternatives discussed. Questions answered and patient/representative(s) expressed understanding.    - Discussed:     - Discussed with:  Patient      - Extended Intubation/Ventilatory Support Discussed: No.      - Patient is DNR/DNI Status: No    Use of blood products discussed: No .     Postoperative Care    Pain management: Oral pain medications, IV analgesics.   PONV prophylaxis: Ondansetron (or other 5HT-3)     Comments:    Other Comments: The risks and benefits of anesthesia, and the alternatives where applicable, have been discussed with the patient, and they wish to proceed.            BINU Cuevas CRNA

## 2023-01-06 NOTE — INTERVAL H&P NOTE
"I have reviewed the surgical (or preoperative) H&P that is linked to this encounter, and examined the patient. There are no significant changes    Clinical Conditions Present on Arrival:  Clinically Significant Risk Factors Present on Admission                # Hypoalbuminemia: Lowest albumin = 3.4 g/dL in the past 30 days , will monitor as appropriate     # Obesity: Estimated body mass index is 37.09 kg/m  as calculated from the following:    Height as of 1/5/23: 1.745 m (5' 8.7\").    Weight as of 1/5/23: 112.9 kg (249 lb).       "

## 2023-01-06 NOTE — DISCHARGE INSTRUCTIONS
[facility]   Adult Discharge Orders & Instructions     For 24 hours after surgery    Get plenty of rest.  A responsible adult must stay with you for at least 24 hours after you leave the hospital.   Do not drive or use heavy equipment.  If you have weakness or tingling, don't drive or use heavy equipment until this feeling goes away.  Do not drink alcohol.  Avoid strenuous or risky activities.  Ask for help when climbing stairs.   You may feel lightheaded.  If so, sit for a few minutes before standing.  Have someone help you get up.   You may have a slight fever. Call the doctor if your fever is over 100 F (37.7 C) (taken under the tongue) or lasts longer than 24 hours.  You may have a dry mouth, a sore throat, muscle aches or trouble sleeping.  These should go away after 24 hours.  Do not make important or legal decisions.  We don t expect you to have any problems from the surgery or treatment you had today. Just in case, here s what to do if you have pain, upset stomach (nausea), bleeding or infection:  Pain:  Take medicines your doctor has prescribed or over-the-counter medicine they have suggested. Resting and using ice packs can help, too. For surgery on an arm or leg, raise it on a pillow to ease swelling. Call your doctor if these methods don t work.  Copyright [employer], Licensed under [employer]  Upset stomach (nausea):  Take anti-nausea medicine approved by your doctor. Drink clear liquids like apple juice, ginger ale, broth or 7-Up. Be sure to drink enough fluids. Rest can help, too. Move to normal foods when you re ready. Bleeding:  In the first 24 hours, you may see a little blood on your dressing, about the size of a quarter. You don t need to worry about this much blood, but if the blood spot keeps getting bigger:  Put pressure on the wound if you can, AND  Call your doctor.  Copyright [employer], Licensed under [employer]  Fever/Infection: Please call  your doctor if you have any of these signs:  Redness  Swelling  Wound feels warm  Pain gets worse  Bad-smelling fluid leaks from wound  Fever or chills  Call your doctor for any of the followin.  It has been over 8 to 10 hours since surgery and you are still not able to urinate (pass water).    2.  Headache for over 24 hours.    Nurse advice line: [phone]     Follow-up Care:    [facility]    Home Care Following Gallbladder Surgery    Dr. Gaitan        Care of the Incision:  Surgical glue was used on your incision, keep it dry for 24 hours.  Then you may shower but don t submerge under water for at least 2 weeks.  Gently pat your incision dry with a freshly laundered towel.  Do not touch your incision with bare hands or pick at scabs.  Leave your incision open to air.  Cover it only if draining, clothing rubs or irritates it.    Activity:  Gradually increase your activity.  Walk short distances several times each day and increase the distance as your strength allows.  To promote circulation, do not cross your legs while sitting.  No strenuous lifting or straining for 2-3 weeks.  Do not lift anything over 10-20 pounds until your doctor approves an increase.  Return to work will be determined by the type of work you do and should be discussed with your physician.  Do not drive or operate equipment while taking prescription pain medicines.  You may drive 1 week after surgery if you have stopped taking prescription pain medicines and can react quickly enough to make an emergency stop if necessary.    Diet:  Continue a low fat diet for 1 week then resume usual diet as tolerated.  Drink plenty of water.  Avoid foods that cause constipation.    REMEMBER--most prescription pain pills cause constipation.  Walking, extra fluids, and increased fiber (fresh fruits and vegetables, etc.) are natural remedies for constipation.  You can also take mineral oil, 1-2 Tablespoons per day.  If still constipated  you may try a stool softener such as Colace or Miralax.    Call Your Physician if You Have:  Redness, increased swelling or cloudy drainage from your incision.  A temperature of more than 101 degrees F.  Worsening pain in your incision not relieved by your prescription pain pills and/or a short rest.  Jaundice (yellowing of skin or eyes)  Abdominal distention (stomach getting very large)  Swelling in your legs  Productive cough  Burning with urination  Any questions or concerns about your recovery, please call     Business hours [phone]    After hours [phone] Nurse Advice Line (24 hours a day)    Follow-up Care:  Make an appointment 2-3 week after your surgery, if not already made.  Call [phone].  MAY TAKE IBUPROFEN AFTER 11:00 P.M.

## 2023-01-07 NOTE — ANESTHESIA POSTPROCEDURE EVALUATION
Patient: Triny Bower    Procedure: Procedure(s):  CHOLECYSTECTOMY, LAPAROSCOPIC       Anesthesia Type:  General    Note:  Disposition: Outpatient   Postop Pain Control: Uneventful            Sign Out: Well controlled pain   PONV: No   Neuro/Psych: Uneventful            Sign Out: Acceptable/Baseline neuro status   Airway/Respiratory: Uneventful            Sign Out: Acceptable/Baseline resp. status   CV/Hemodynamics: Uneventful            Sign Out: Acceptable CV status   Other NRE: NONE   DID A NON-ROUTINE EVENT OCCUR? No    Event details/Postop Comments:  Pt was happy with anesthesia care. She has some initial right shoulder pain immediately post-op that responded well to narcotics.  She is currently resting without complaint. Will follow if needed.            Last vitals:  Vitals Value Taken Time   /82 01/06/23 1800   Temp 97.34  F (36.3  C) 01/06/23 1809   Pulse 77 01/06/23 1811   Resp 10 01/06/23 1811   SpO2 100 % 01/06/23 1810   Vitals shown include unvalidated device data.    Electronically Signed By: BINU Cuevas CRNA  January 6, 2023  6:15 PM

## 2023-01-07 NOTE — OP NOTE
Procedure Date: 01/06/2023    PREOPERATIVE DIAGNOSIS:  Chronic cholecystitis.    POSTOPERATIVE DIAGNOSIS:  Acute-on-chronic cholecystitis.    PROCEDURE PERFORMED:  Laparoscopic cholecystectomy.    SURGEON:  Adryan Gaitan MD, [facility]    ANESTHESIA:  General via endotracheal.    INDICATIONS FOR PROCEDURE:  This is a 52-year-old lady who originally presented with the complaint of a fist-like sensation in her epigastrium made worse with meals.  She had a subsequent HIDA scan that revealed an ejection fraction of 12% and the injection of CCK did reproduce her symptoms.  When she presented for followup yesterday, she said the pain had worsened and began radiating to her right back and there was some associated nausea.  For this reason, we elected to take her to the operating room for laparoscopic cholecystectomy.    OPERATIVE FINDINGS:  Included a thickened gallbladder with some edema.    DESCRIPTION OF PROCEDURE:  The patient was taken to the operating room and placed on the table in supine position.  After induction of anesthesia, the abdomen was prepped and draped in sterile fashion.  A timeout was performed confirming the day and the patient as well as the procedure to be performed.  A supraumbilical incision was made.  A Visiport was inserted into the abdomen under direct visualization.  The abdomen was then insufflated to 15 mmHg pressure.  Generalized inspection of the abdomen was then carried out.  There was no evidence of any injury from the Visiport.  A distended gallbladder was readily seen in the right upper quadrant.  Two right upper quadrant 5 mm trocars and an 11 mm subxiphoid trocar were placed.  The gallbladder was grasped, pulled cephalad.  The peritoneum on the medial and lateral aspect of the gallbladder was then taken down using a ConMed dissector.  We then used a [place] dissector and created a large window under the base of the gallbladder.  During this dissection, the cystic artery was cauterized.   After creating a large window, the cystic duct was then clipped and transected.  The gallbladder was removed from the liver bed using cautery.  There was noted to be quite a bit of edema between the gallbladder and the liver bed.  After the gallbladder was removed, it was submitted as specimen.  Hemostasis of the liver bed was achieved using cautery.  The area was copiously irrigated.  All fluid was suctioned out.  The liver bed was reinspected without evidence of any bleeding. The subxiphoid trocar was removed.  The fascia was closed using a cone PMI needle and #1 PDS.  The supraumbilical trocar was removed, and the fascia was closed using a single stitch of #1 PDS.  All skin incisions were closed using 3-0 Vicryl and Exofin glue.  The patient was then taken from the operating room to the recovery room in stable condition to be sent home.    Adryan Gaitan MD, FACS        D: 2023   T: 2023   MT: AMIE    Name:     RAHDA GRANDE  MRN:      [id number]        Account:        [id number]   :      1970           Procedure Date: 2023     Document: [id number]

## 2023-01-07 NOTE — ANESTHESIA CARE TRANSFER NOTE
Patient: Triny Bower    Procedure: Procedure(s):  CHOLECYSTECTOMY, LAPAROSCOPIC       Diagnosis: Chronic cholecystitis [K81.1]  Diagnosis Additional Information: No value filed.    Anesthesia Type:   General     Note:    Oropharynx: oropharynx clear of all foreign objects and spontaneously breathing  Level of Consciousness: drowsy  Oxygen Supplementation: face mask    Independent Airway: airway patency satisfactory and stable  Dentition: dentition unchanged  Vital Signs Stable: post-procedure vital signs reviewed and stable  Report to RN Given: handoff report given  Patient transferred to: PACU    Handoff Report: Identifed the Patient, Identified the Reponsible Provider, Reviewed the pertinent medical history, Discussed the surgical course, Reviewed Intra-OP anesthesia mangement and issues during anesthesia, Set expectations for post-procedure period and Allowed opportunity for questions and acknowledgement of understanding      Vitals:  Vitals Value Taken Time   /82 01/06/23 1800   Temp 97.34  F (36.3  C) 01/06/23 1809   Pulse 77 01/06/23 1811   Resp 10 01/06/23 1811   SpO2 100 % 01/06/23 1810   Vitals shown include unvalidated device data.    Electronically Signed By: BINU Cuevas CRNA  January 6, 2023  6:13 PM

## 2023-01-11 LAB
PATH REPORT.COMMENTS IMP SPEC: NORMAL
PATH REPORT.COMMENTS IMP SPEC: NORMAL
PATH REPORT.FINAL DX SPEC: NORMAL
PATH REPORT.GROSS SPEC: NORMAL
PATH REPORT.MICROSCOPIC SPEC OTHER STN: NORMAL
PATH REPORT.RELEVANT HX SPEC: NORMAL
PHOTO IMAGE: NORMAL

## 2023-01-12 ENCOUNTER — TELEPHONE (OUTPATIENT)
Dept: SURGERY | Facility: CLINIC | Age: 53
End: 2023-01-12

## 2023-01-12 NOTE — TELEPHONE ENCOUNTER
Writer spoke with patient regarding below. Patient underwent laparoscopic cholecystectomy on 1/6/23 with Dr. Gaitan. Patient calling stating that patient is having pain and pressure to sternum area that radiates to right side back. Patient states pain is 5/10. Patient describes this pain as being the same type of pain she had prior to her surgery. Patient denies fever or chills. Patient is passing gas and have regular, normal bowel movements. Patient is currently taking 800 mg of ibuprofen every night x 2 days. Writer discussed this with Dr. Gaitan, per provider patient to take omeprazole 20 mg daily. Writer educated patient on signs and symptoms of when to seek emergency medical care. Patient will call to update and if need be patient can be scheduled with Dr. Martinez in Arnie's absence. Patient have verbal understanding of and is comfortable with plan. Patient has no further questions or concerns.    Roberta Winston RN on 1/12/2023 at 4:03 PM

## 2023-01-12 NOTE — TELEPHONE ENCOUNTER
Reason for Call:  Other call back    Detailed comments: Patient states she would like to speak with RN regarding recent surgery.  6 days post surgery and still having discomfort.  She is hoping to see Dr. Gaitan soon.     Phone Number Patient can be reached at: Home number on file [phone] (home) or Cell number on file:    Telephone Information:   Mobile [phone]       Best Time: any    Can we leave a detailed message on this number? YES    Call taken on 1/12/2023 at 3:07 PM by Herlinda Palma

## 2023-01-14 ENCOUNTER — HEALTH MAINTENANCE LETTER (OUTPATIENT)
Age: 53
End: 2023-01-14

## 2023-01-17 ENCOUNTER — TELEPHONE (OUTPATIENT)
Dept: SURGERY | Facility: CLINIC | Age: 53
End: 2023-01-17

## 2023-01-17 ENCOUNTER — HOSPITAL ENCOUNTER (EMERGENCY)
Facility: CLINIC | Age: 53
Discharge: HOME | End: 2023-01-17
Attending: EMERGENCY MEDICINE | Admitting: EMERGENCY MEDICINE
Payer: COMMERCIAL

## 2023-01-17 VITALS
WEIGHT: 248 LBS | TEMPERATURE: 98.2 F | BODY MASS INDEX: 36.94 KG/M2 | RESPIRATION RATE: 18 BRPM | SYSTOLIC BLOOD PRESSURE: 131 MMHG | HEART RATE: 70 BPM | DIASTOLIC BLOOD PRESSURE: 98 MMHG | OXYGEN SATURATION: 100 %

## 2023-01-17 DIAGNOSIS — R10.13 ABDOMINAL PAIN, EPIGASTRIC: ICD-10-CM

## 2023-01-17 LAB
ALBUMIN SERPL BCG-MCNC: 3.8 G/DL (ref 3.5–5.2)
ALP SERPL-CCNC: 48 U/L (ref 35–104)
ALT SERPL W P-5'-P-CCNC: 14 U/L (ref 10–35)
ANION GAP SERPL CALCULATED.3IONS-SCNC: 9 MMOL/L (ref 7–15)
AST SERPL W P-5'-P-CCNC: 9 U/L (ref 10–35)
BASOPHILS # BLD AUTO: 0.2 10E3/UL (ref 0–0.2)
BASOPHILS NFR BLD AUTO: 2 %
BILIRUB SERPL-MCNC: <0.2 MG/DL
BUN SERPL-MCNC: 13.9 MG/DL (ref 6–20)
CALCIUM SERPL-MCNC: 9.3 MG/DL (ref 8.6–10)
CHLORIDE SERPL-SCNC: 106 MMOL/L (ref 98–107)
CREAT SERPL-MCNC: 0.86 MG/DL (ref 0.51–0.95)
DEPRECATED HCO3 PLAS-SCNC: 25 MMOL/L (ref 22–29)
EOSINOPHIL # BLD AUTO: 1 10E3/UL (ref 0–0.7)
EOSINOPHIL NFR BLD AUTO: 10 %
ERYTHROCYTE [DISTWIDTH] IN BLOOD BY AUTOMATED COUNT: 13.9 % (ref 10–15)
GFR SERPL CREATININE-BSD FRML MDRD: 81 ML/MIN/1.73M2
GLUCOSE SERPL-MCNC: 98 MG/DL (ref 70–99)
HCT VFR BLD AUTO: 38.4 % (ref 35–47)
HGB BLD-MCNC: 12.2 G/DL (ref 11.7–15.7)
IMM GRANULOCYTES # BLD: 0 10E3/UL
IMM GRANULOCYTES NFR BLD: 0 %
LIPASE SERPL-CCNC: 55 U/L (ref 13–60)
LYMPHOCYTES # BLD AUTO: 3.4 10E3/UL (ref 0.8–5.3)
LYMPHOCYTES NFR BLD AUTO: 33 %
MCH RBC QN AUTO: 27.4 PG (ref 26.5–33)
MCHC RBC AUTO-ENTMCNC: 31.8 G/DL (ref 31.5–36.5)
MCV RBC AUTO: 86 FL (ref 78–100)
MONOCYTES # BLD AUTO: 0.6 10E3/UL (ref 0–1.3)
MONOCYTES NFR BLD AUTO: 6 %
NEUTROPHILS # BLD AUTO: 5.1 10E3/UL (ref 1.6–8.3)
NEUTROPHILS NFR BLD AUTO: 49 %
NRBC # BLD AUTO: 0 10E3/UL
NRBC BLD AUTO-RTO: 0 /100
PLATELET # BLD AUTO: 386 10E3/UL (ref 150–450)
POTASSIUM SERPL-SCNC: 4.3 MMOL/L (ref 3.4–5.3)
PROT SERPL-MCNC: 6.4 G/DL (ref 6.4–8.3)
RBC # BLD AUTO: 4.46 10E6/UL (ref 3.8–5.2)
SODIUM SERPL-SCNC: 140 MMOL/L (ref 136–145)
TROPONIN T SERPL HS-MCNC: <6 NG/L
WBC # BLD AUTO: 10.2 10E3/UL (ref 4–11)

## 2023-01-17 PROCEDURE — 84484 ASSAY OF TROPONIN QUANT: CPT | Performed by: EMERGENCY MEDICINE

## 2023-01-17 PROCEDURE — 36415 COLL VENOUS BLD VENIPUNCTURE: CPT | Performed by: EMERGENCY MEDICINE

## 2023-01-17 PROCEDURE — 250N000013 HC RX MED GY IP 250 OP 250 PS 637: Performed by: EMERGENCY MEDICINE

## 2023-01-17 PROCEDURE — 85004 AUTOMATED DIFF WBC COUNT: CPT | Performed by: EMERGENCY MEDICINE

## 2023-01-17 PROCEDURE — 250N000009 HC RX 250: Performed by: EMERGENCY MEDICINE

## 2023-01-17 PROCEDURE — 83690 ASSAY OF LIPASE: CPT | Performed by: EMERGENCY MEDICINE

## 2023-01-17 PROCEDURE — 99283 EMERGENCY DEPT VISIT LOW MDM: CPT | Performed by: EMERGENCY MEDICINE

## 2023-01-17 PROCEDURE — 99284 EMERGENCY DEPT VISIT MOD MDM: CPT | Performed by: EMERGENCY MEDICINE

## 2023-01-17 PROCEDURE — 80053 COMPREHEN METABOLIC PANEL: CPT | Performed by: EMERGENCY MEDICINE

## 2023-01-17 RX ORDER — IBUPROFEN 200 MG
800 TABLET ORAL DAILY
COMMUNITY
End: 2024-07-16

## 2023-01-17 RX ADMIN — LIDOCAINE HYDROCHLORIDE 30 ML: 20 SOLUTION ORAL; TOPICAL at 13:23

## 2023-01-17 ASSESSMENT — ACTIVITIES OF DAILY LIVING (ADL): ADLS_ACUITY_SCORE: 35

## 2023-01-17 NOTE — TELEPHONE ENCOUNTER
Reason for Call:  Other appointment    Detailed comments: Patient had surgery on 01/06 and she is still feeling a lot of the symptoms she was having before the surgery. Wondering if she should be coming in sooner than the 26th? Or what she should be doing.     Phone Number Patient can be reached at: Home number on file [phone] (home)    Best Time: any    Can we leave a detailed message on this number? YES    Call taken on 1/17/2023 at 10:03 AM by Kerrie aRmírez

## 2023-01-17 NOTE — DISCHARGE INSTRUCTIONS
Recommend continuing with omeprazole twice a day.  May also use antacids as needed for any discomfort.

## 2023-01-17 NOTE — TELEPHONE ENCOUNTER
Nursing Note    D:  Patient calls almost in tears. Report 7/10 pressure starting just below sternum radiating more now to chest.  Worsening since Sunday afternoon. More persistent. Not heartburn. Pressure increases with sitting position. Some relief when straightened out in standing/laying positions. taking Ibuprofen before bed, omemprazole BID (not helping so advised her to cut down to prescribed amount of 1 tab daily), tylenol doesn't help.  Worse than pre-op. Intensity is the same. Getting sharper, radiating more to chest. Flank pain improved.    GI: drinking adequately, smaller meals, has an appetite but miserable after eating. BMs daily -loose. Loose consistency since pre-op.    A:  Will review with Dr. Gaitan, then respond to patient with next steps    R:      Kerrie Giraldo RN  [facility]  [phone]  1/17/2023 10:28 AM

## 2023-01-17 NOTE — TELEPHONE ENCOUNTER
Spoke to patient and reviewed symptoms with her.  She reports the same preoperative substernal fist like sensation in the epigastrium.  Unchanged from preop.  No nausea vomiting fevers or chills.  She is tolerating a diet.  She is taking her PPI.  In view of the symptoms I did recommend she go to the ER today for an evaluation as this is unusual this far postop.  She is very reticent to go.  She said she will go this afternoon if it gets worse.  But she is willing to see Dr. Martinez tomorrow.  I do have her on Dr. Martinez schedule but she knows to go to the ER this afternoon which was strongly encouraged.

## 2023-01-17 NOTE — ED PROVIDER NOTES
History     Chief Complaint   Patient presents with     Chest Pain     HPI  Triny Bower is a 52 year old female who presents with epigastric abdominal pain.  This is been ongoing for 2 months.  She was seen in the emergency department and eventually it was recommended she have her gallbladder removed.  This has not helped the pain.  She has not had upper endoscopy for this.  She has been intermittently taking omeprazole.  Last time she was here she reports a GI cocktail helped some    Allergies:  No Known Allergies    Problem List:    Patient Active Problem List    Diagnosis Date Noted     Morbid obesity (H) 07/08/2021     Priority: Medium     Tendinitis of left wrist 04/04/2018     Priority: Medium     Hyperlipidemia LDL goal <130 03/22/2018     Priority: Medium     Gastroesophageal reflux disease without esophagitis 06/29/2015     Priority: Medium     Constipation, unspecified constipation type 06/29/2015     Priority: Medium     Major depressive disorder, recurrent episode, mild (H) 10/04/2012     Priority: Medium     Displacement of lumbar intervertebral disc without myelopathy 08/21/2012     Priority: Medium     Herniated nucleus pulposus, L5-S1, right 06/06/2012     Priority: Medium     historically       Lumbar radiculopathy 12/17/2010     Priority: Medium        Past Medical History:    Past Medical History:   Diagnosis Date     Displacement of lumbar intervertebral disc without myelopathy 01/20/10     Hyperlipidemia LDL goal <130 3/22/2018       Past Surgical History:    Past Surgical History:   Procedure Laterality Date     BACK SURGERY       DILATE CERVIX, HYSTEROSCOPY, ABLATE ENDOMETRIUM NOVASURE, COMBINED N/A 7/31/2019    Procedure: Dilateand curretage cervix, hysteroscopy, ablate endometrium novasure, combined;  Surgeon: Viral Hodges MD;  [facility]     GENITOURINARY SURGERY  04/2018     HEMILAMINECTOMY, DISCECTOMY LUMBAR TWO LEVELS, COMBINED  8/22/2012    Procedure:  COMBINED HEMILAMINECTOMY, DISCECTOMY LUMBAR TWO LEVELS;  Discectomy right Lumbar 4-Sacral 1, Hemilaminectomy bilateral Lumbar 4-Sacral 1;  Surgeon: Shree Martinez MD;  [facility]     LAMINECT/DISCECTOMY, LUMBAR  01/20/10    L4-5     LAPAROSCOPIC CHOLECYSTECTOMY N/A 1/6/2023    Procedure: CHOLECYSTECTOMY, LAPAROSCOPIC;  Surgeon: Adryan Gaitan MD;  [facility]     OPERATIVE HYSTEROSCOPY WITH MORCELLATOR N/A 7/31/2019    Procedure: Diagnostic hysteroscopy, myosure myomectomy;  Surgeon: Viral Hodges MD;  [facility]     TUBAL LIGATION  1995       Family History:    Family History   Problem Relation Age of Onset     Cancer Maternal Grandmother         cervical cancer     Gynecology Maternal Grandmother         cervical cancer     Arthritis Paternal Grandmother      Osteoporosis Paternal Grandmother      Breast Cancer Mother         70's     Hypertension Mother        Social History:  Marital Status:   [2]  Social History     Tobacco Use     Smoking status: Former     Packs/day: 0.00     Years: 10.00     Pack years: 0.00     Types: Cigarettes     Smokeless tobacco: Never   Vaping Use     Vaping Use: Never used   Substance Use Topics     Alcohol use: Not Currently     Comment: Very Rare     Drug use: No        Medications:    ibuprofen (ADVIL/MOTRIN) 200 MG tablet  omeprazole (PRILOSEC OTC) 20 MG EC tablet  ondansetron (ZOFRAN ODT) 4 MG ODT tab  oxyCODONE (ROXICODONE) 5 MG tablet          Review of Systems  All other systems are reviewed and are negative    Physical Exam   BP: (!) 140/89  Pulse: 70  Temp: 98.2  F (36.8  C)  Resp: 14  Weight: 112.5 kg (248 lb)  SpO2: 99 %      Physical Exam  Vitals and nursing note reviewed.   Constitutional:       General: She is not in acute distress.     Appearance: She is well-developed. She is not diaphoretic.   HENT:      Head: Normocephalic and atraumatic.   Eyes:      General: No scleral icterus.     Pupils: Pupils are equal, round, and  reactive to light.   Cardiovascular:      Rate and Rhythm: Normal rate and regular rhythm.      Heart sounds: Normal heart sounds. No murmur heard.  Pulmonary:      Effort: No respiratory distress.      Breath sounds: No stridor. No wheezing or rales.   Abdominal:      Palpations: Abdomen is soft.      Tenderness: There is abdominal tenderness (moderate epigastric).   Musculoskeletal:         General: No tenderness.      Cervical back: Normal range of motion and neck supple.   Skin:     General: Skin is warm and dry.      Coloration: Skin is not pale.      Findings: No erythema or rash.   Neurological:      Mental Status: She is alert.         ED Course                 Procedures              Critical Care time:  none               Results for orders placed or performed during the hospital encounter of 01/17/23 (from the past 24 hour(s))   CBC with platelets differential    Narrative    The following orders were created for panel order CBC with platelets differential.  Procedure                               Abnormality         Status                     ---------                               -----------         ------                     CBC with platelets and d...[[id number]]  Abnormal            Final result                 Please view results for these tests on the individual orders.   Comprehensive metabolic panel   Result Value Ref Range    Sodium 140 136 - 145 mmol/L    Potassium 4.3 3.4 - 5.3 mmol/L    Chloride 106 98 - 107 mmol/L    Carbon Dioxide (CO2) 25 22 - 29 mmol/L    Anion Gap 9 7 - 15 mmol/L    Urea Nitrogen 13.9 6.0 - 20.0 mg/dL    Creatinine 0.86 0.51 - 0.95 mg/dL    Calcium 9.3 8.6 - 10.0 mg/dL    Glucose 98 70 - 99 mg/dL    Alkaline Phosphatase 48 35 - 104 U/L    AST 9 (L) 10 - 35 U/L    ALT 14 10 - 35 U/L    Protein Total 6.4 6.4 - 8.3 g/dL    Albumin 3.8 3.5 - 5.2 g/dL    Bilirubin Total <0.2 <=1.2 mg/dL    GFR Estimate 81 >60 mL/min/1.73m2   Lipase   Result Value Ref Range    Lipase 55 13 -  60 U/L   Troponin T, High Sensitivity   Result Value Ref Range    Troponin T, High Sensitivity <6 <=14 ng/L   CBC with platelets and differential   Result Value Ref Range    WBC Count 10.2 4.0 - 11.0 10e3/uL    RBC Count 4.46 3.80 - 5.20 10e6/uL    Hemoglobin 12.2 11.7 - 15.7 g/dL    Hematocrit 38.4 35.0 - 47.0 %    MCV 86 78 - 100 fL    MCH 27.4 26.5 - 33.0 pg    MCHC 31.8 31.5 - 36.5 g/dL    RDW 13.9 10.0 - 15.0 %    Platelet Count 386 150 - 450 10e3/uL    % Neutrophils 49 %    % Lymphocytes 33 %    % Monocytes 6 %    % Eosinophils 10 %    % Basophils 2 %    % Immature Granulocytes 0 %    NRBCs per 100 WBC 0 <1 /100    Absolute Neutrophils 5.1 1.6 - 8.3 10e3/uL    Absolute Lymphocytes 3.4 0.8 - 5.3 10e3/uL    Absolute Monocytes 0.6 0.0 - 1.3 10e3/uL    Absolute Eosinophils 1.0 (H) 0.0 - 0.7 10e3/uL    Absolute Basophils 0.2 0.0 - 0.2 10e3/uL    Absolute Immature Granulocytes 0.0 <=0.4 10e3/uL    Absolute NRBCs 0.0 10e3/uL       Medications   lidocaine (viscous) (XYLOCAINE) 2 % 15 mL, alum & mag hydroxide-simethicone (MAALOX) 15 mL GI Cocktail (30 mLs Oral Given 1/17/23 1323)       Assessments & Plan (with Medical Decision Making)  52-year-old female with ongoing epigastric abdominal pain over the last 2 months.  She has had her gallbladder out in the interim without relief.  Lipase normal.  Pain partly relieved with GI cocktail here.  suspect component of possible peptic ulcer disease.  Have referred for outpatient upper endoscopy.  Have recommended she follow-up in clinic in the next week.  Also recommended she continue with her omeprazole twice a day and antacids as needed.     I have reviewed the nursing notes.    I have reviewed the findings, diagnosis, plan and need for follow up with the patient.             New Prescriptions    No medications on file       Final diagnoses:   Abdominal pain, epigastric       1/17/2023   [facility] EMERGENCY DEPT     Rizwan Reilly MD  01/17/23 3859

## 2023-01-17 NOTE — ED TRIAGE NOTES
Triage Assessment     Row Name 01/17/23 1151       Triage Assessment (Adult)    Airway WDL WDL    Additional Documentation Breath Sounds (Group)       Respiratory WDL    Respiratory WDL WDL       Skin Circulation/Temperature WDL    Skin Circulation/Temperature WDL WDL       Cardiac WDL    Cardiac WDL WDL       Peripheral/Neurovascular WDL    Peripheral Neurovascular WDL WDL       Cognitive/Neuro/Behavioral WDL    Cognitive/Neuro/Behavioral WDL WDL

## 2023-01-17 NOTE — TELEPHONE ENCOUNTER
Writer spoke to patient. She changed her mind and is going to the emergency department instead. I cancelled the visit with Michelle as this was the backup suggestion to going to the ED.    Kerrie Giraldo RN on 1/17/2023 at 11:01 AM

## 2023-01-17 NOTE — ED TRIAGE NOTES
"Presents with chest pain that has been on going since Thanksgiving. She reports having her Gallbladder removed on Jan 6th 'because they thought it was my Gallbladder\" but states she has never felt better and the pain is getting worse.     Triage Assessment     Row Name 01/17/23 1151       Triage Assessment (Adult)    Airway WDL WDL    Additional Documentation Breath Sounds (Group)       Respiratory WDL    Respiratory WDL WDL       Skin Circulation/Temperature WDL    Skin Circulation/Temperature WDL WDL       Cardiac WDL    Cardiac WDL WDL       Peripheral/Neurovascular WDL    Peripheral Neurovascular WDL WDL       Cognitive/Neuro/Behavioral WDL    Cognitive/Neuro/Behavioral WDL WDL              "

## 2023-01-19 ENCOUNTER — TELEPHONE (OUTPATIENT)
Dept: GASTROENTEROLOGY | Facility: CLINIC | Age: 53
End: 2023-01-19
Payer: COMMERCIAL

## 2023-01-19 NOTE — TELEPHONE ENCOUNTER
"    Screening Questions  BLUE  KIND OF PREP RED  LOCATION [review exclusion criteria] GREEN  SEDATION TYPE        Y Are you active on mychart?       Rizwan Reilly Ordering/Referring Provider?        MEDICA What type of coverage do you have?      N Have you had a positive covid test in the last 14 days?     36.94 1. BMI  [BMI 40+ - review exclusion criteria]    Y  2. Are you able to give consent for your medical care? [IF NO,RN REVIEW]          N  3. Are you taking any prescription pain medications on a routine schedule   (ex narcotics: tramadol, oxycodone, roxicodone, oxycontin,  and percocet)?        N  3a. EXTENDED PREP What kind of prescription?     N 4. Do you have any chemical dependencies such as alcohol, street drugs, or methadone?        **If yes 3- 5 , please schedule with MAC sedation.**          IF YES TO ANY 6 - 10 - HOSPITAL SETTING ONLY.     N 6.   Do you need assistance transferring?     N 7.   Have you had a heart or lung transplant?    N 8.   Are you currently on dialysis?   N 9.   Do you use daily home oxygen?   N 10. Do you take nitroglycerin?   10a. N If yes, how often?     11. [FEMALES]  N Are you currently pregnant?    11a. N If yes, how many weeks? [ Greater than 12 weeks, OR NEEDED]    N 12. Do you have Pulmonary Hypertension? *NEED PAC APPT AT [facility]*     N 13. [review exclusion criteria]  Do you have any implantable devices in your body (pacemaker, defib, LVAD)?    N 14. In the past 6 months, have you had any heart related issues including cardiomyopathy or heart attack?     14a. N If yes, did it require cardiac stenting if so when?     N 15. Have you had a stroke or Transient ischemic attack (TIA - aka  mini stroke ) within 6 months?      N 16. Do you have mod to severe Obstructive Sleep Apnea?  [Hospital only]    N 17. Do you have SEVERE AND UNCONTROLLED asthma? *NEED PAC APPT AT [facility]*     N 18. Are you currently taking any blood thinners?     18a. If yes, inform patient to \"follow up " "w/ ordering provider for bridging instructions.\"    N 19. Do you take the medication Phentermine?    19a. If yes, \"Hold for 7 days before procedure.  Please consult your prescribing provider if you have questions about holding this medication.\"     N  20. Do you have chronic kidney disease?      N  21. Do you have a diagnosis of diabetes?     N  22. On a regular basis do you go 3-5 days between bowel movements?      23. Preferred LOCAL Pharmacy for Pre Prescription    [ LIST ONLY ONE PHARMACY]        [id number] - [place], [place] - [address] [place] [address] [facility] [address] [place], [place] - [address]        - CLOSING REMINDERS -    Informed patient they will need an adult    Cannot take any type of public or medical transportation alone    Conscious Sedation- Needs  for 6 hours after the procedure       MAC/General-Needs  for 24 hours after procedure    Pre-Procedure Covid test to be completed [[facility] PCR Testing Required]    Confirmed Nurse will call to complete assessment       - SCHEDULING DETAILS -  N Hospital Setting Required? If yes, what is the exclusion?: ANAHI COYNE  Surgeon    1/24/23  Date of Procedure  Upper Endoscopy [EGD]  Type of Procedure Scheduled  [facility] Location     MAC Sedation Type      PAC / Pre-op Required                 "

## 2023-01-20 NOTE — H&P
Surgery    Pt is well known to me for epigastric pain.  Had abnl HIDA and cholecystectomy.  CCK did reproduce sx.  Still has continued epigastric pain.  No change in H&P since last seen.  Proceed to EGD as planned.  The procedure, risks(bleeding, perforation), benefits and alternatives were discussed and the patient agrees to proceed. Cleared for Anesthesia      Adryan Gaitan MD, FACS

## 2023-01-24 ENCOUNTER — HOSPITAL ENCOUNTER (OUTPATIENT)
Facility: CLINIC | Age: 53
Discharge: HOME | End: 2023-01-24
Attending: SPECIALIST | Admitting: SPECIALIST
Payer: COMMERCIAL

## 2023-01-24 ENCOUNTER — ANESTHESIA EVENT (OUTPATIENT)
Dept: GASTROENTEROLOGY | Facility: CLINIC | Age: 53
End: 2023-01-24
Payer: COMMERCIAL

## 2023-01-24 ENCOUNTER — ANESTHESIA (OUTPATIENT)
Dept: GASTROENTEROLOGY | Facility: CLINIC | Age: 53
End: 2023-01-24
Payer: COMMERCIAL

## 2023-01-24 VITALS
RESPIRATION RATE: 16 BRPM | OXYGEN SATURATION: 100 % | TEMPERATURE: 98 F | HEART RATE: 78 BPM | DIASTOLIC BLOOD PRESSURE: 82 MMHG | SYSTOLIC BLOOD PRESSURE: 118 MMHG

## 2023-01-24 LAB — UPPER GI ENDOSCOPY: NORMAL

## 2023-01-24 PROCEDURE — 250N000009 HC RX 250: Performed by: NURSE ANESTHETIST, CERTIFIED REGISTERED

## 2023-01-24 PROCEDURE — 88305 TISSUE EXAM BY PATHOLOGIST: CPT | Mod: TC | Performed by: SPECIALIST

## 2023-01-24 PROCEDURE — 88305 TISSUE EXAM BY PATHOLOGIST: CPT | Mod: 26 | Performed by: PATHOLOGY

## 2023-01-24 PROCEDURE — 43239 EGD BIOPSY SINGLE/MULTIPLE: CPT | Performed by: SPECIALIST

## 2023-01-24 PROCEDURE — 250N000011 HC RX IP 250 OP 636: Performed by: NURSE ANESTHETIST, CERTIFIED REGISTERED

## 2023-01-24 PROCEDURE — 258N000003 HC RX IP 258 OP 636: Performed by: NURSE ANESTHETIST, CERTIFIED REGISTERED

## 2023-01-24 PROCEDURE — 370N000017 HC ANESTHESIA TECHNICAL FEE, PER MIN: Performed by: SPECIALIST

## 2023-01-24 RX ORDER — LIDOCAINE 40 MG/G
CREAM TOPICAL
Status: DISCONTINUED | OUTPATIENT
Start: 2023-01-24 | End: 2023-01-24 | Stop reason: HOSPADM

## 2023-01-24 RX ORDER — PROPOFOL 10 MG/ML
INJECTION, EMULSION INTRAVENOUS PRN
Status: DISCONTINUED | OUTPATIENT
Start: 2023-01-24 | End: 2023-01-24

## 2023-01-24 RX ORDER — LIDOCAINE HYDROCHLORIDE 20 MG/ML
INJECTION, SOLUTION INFILTRATION; PERINEURAL PRN
Status: DISCONTINUED | OUTPATIENT
Start: 2023-01-24 | End: 2023-01-24

## 2023-01-24 RX ORDER — PROPOFOL 10 MG/ML
INJECTION, EMULSION INTRAVENOUS CONTINUOUS PRN
Status: DISCONTINUED | OUTPATIENT
Start: 2023-01-24 | End: 2023-01-24

## 2023-01-24 RX ORDER — SODIUM CHLORIDE, SODIUM LACTATE, POTASSIUM CHLORIDE, CALCIUM CHLORIDE 600; 310; 30; 20 MG/100ML; MG/100ML; MG/100ML; MG/100ML
INJECTION, SOLUTION INTRAVENOUS CONTINUOUS
Status: DISCONTINUED | OUTPATIENT
Start: 2023-01-24 | End: 2023-01-24 | Stop reason: HOSPADM

## 2023-01-24 RX ADMIN — PROPOFOL 50 MG: 10 INJECTION, EMULSION INTRAVENOUS at 13:18

## 2023-01-24 RX ADMIN — SODIUM CHLORIDE, POTASSIUM CHLORIDE, SODIUM LACTATE AND CALCIUM CHLORIDE: 600; 310; 30; 20 INJECTION, SOLUTION INTRAVENOUS at 13:00

## 2023-01-24 RX ADMIN — LIDOCAINE HYDROCHLORIDE 50 MG: 20 INJECTION, SOLUTION INFILTRATION; PERINEURAL at 13:18

## 2023-01-24 RX ADMIN — PROPOFOL 200 MCG/KG/MIN: 10 INJECTION, EMULSION INTRAVENOUS at 13:18

## 2023-01-24 ASSESSMENT — ACTIVITIES OF DAILY LIVING (ADL): ADLS_ACUITY_SCORE: 35

## 2023-01-24 NOTE — ANESTHESIA POSTPROCEDURE EVALUATION
Patient: Triny Bower    Procedure: Procedure(s):  ESOPHAGOGASTRODUODENOSCOPY, WITH BIOPSY       Anesthesia Type:  MAC    Note:  Anesthesia Post Evaluation    Last vitals:  Vitals Value Taken Time   /82 01/24/23 1400   Temp     Pulse 78 01/24/23 1400   Resp     SpO2 100 % 01/24/23 1400       Electronically Signed By: BINU Cuevas CRNA  January 24, 2023  2:16 PM

## 2023-01-24 NOTE — ANESTHESIA POSTPROCEDURE EVALUATION
Patient: Triny Bower    Procedure: Procedure(s):  ESOPHAGOGASTRODUODENOSCOPY, WITH BIOPSY       Anesthesia Type:  MAC    Note:  Disposition: Outpatient   Postop Pain Control: Uneventful            Sign Out: Well controlled pain   PONV: No   Neuro/Psych: Uneventful            Sign Out: Acceptable/Baseline neuro status   Airway/Respiratory: Uneventful            Sign Out: Acceptable/Baseline resp. status   CV/Hemodynamics: Uneventful            Sign Out: Acceptable CV status   Other NRE: NONE   DID A NON-ROUTINE EVENT OCCUR? No    Event details/Postop Comments:  Pt was happy with anesthesia care.  No complications.  I will follow up with the pt if needed.           Last vitals:  Vitals Value Taken Time   /82 01/24/23 1400   Temp     Pulse 78 01/24/23 1400   Resp     SpO2 100 % 01/24/23 1400       Electronically Signed By: BINU Cuevas CRNA  January 24, 2023  2:17 PM

## 2023-01-24 NOTE — ANESTHESIA CARE TRANSFER NOTE
Patient: Triny Bower    Procedure: Procedure(s):  ESOPHAGOGASTRODUODENOSCOPY, WITH BIOPSY       Diagnosis: Abdominal pain, epigastric [R10.13]  Diagnosis Additional Information: No value filed.    Anesthesia Type:   MAC     Note:    Oropharynx: oropharynx clear of all foreign objects and spontaneously breathing  Level of Consciousness: drowsy  Oxygen Supplementation: room air    Independent Airway: airway patency satisfactory and stable  Dentition: dentition unchanged  Vital Signs Stable: post-procedure vital signs reviewed and stable  Report to RN Given: handoff report given  Patient transferred to: Phase II    Handoff Report: Identifed the Patient, Identified the Reponsible Provider, Reviewed the pertinent medical history, Discussed the surgical course, Reviewed Intra-OP anesthesia mangement and issues during anesthesia, Set expectations for post-procedure period and Allowed opportunity for questions and acknowledgement of understanding      Vitals:  Vitals Value Taken Time   /82 01/24/23 1400   Temp     Pulse 78 01/24/23 1400   Resp     SpO2 100 % 01/24/23 1400       Electronically Signed By: BINU Cuevas CRNA  January 24, 2023  2:16 PM

## 2023-01-24 NOTE — ANESTHESIA PREPROCEDURE EVALUATION
Anesthesia Pre-Procedure Evaluation    Patient: Triny Bower   MRN: [id number] : 1970        Procedure : Procedure(s):  CHOLECYSTECTOMY, LAPAROSCOPIC          Past Medical History:   Diagnosis Date     Displacement of lumbar intervertebral disc without myelopathy 01/20/10    Transfer to [facility]     Hyperlipidemia LDL goal <130 3/22/2018      Past Surgical History:   Procedure Laterality Date     BACK SURGERY       DILATE CERVIX, HYSTEROSCOPY, ABLATE ENDOMETRIUM NOVASURE, COMBINED N/A 2019    Procedure: Dilateand curretage cervix, hysteroscopy, ablate endometrium novasure, combined;  Surgeon: Viral Hodges MD;  [facility]     GENITOURINARY SURGERY  2018     HEMILAMINECTOMY, DISCECTOMY LUMBAR TWO LEVELS, COMBINED  2012    Procedure: COMBINED HEMILAMINECTOMY, DISCECTOMY LUMBAR TWO LEVELS;  Discectomy right Lumbar 4-Sacral 1, Hemilaminectomy bilateral Lumbar 4-Sacral 1;  Surgeon: Shree Martinez MD;  [facility]     LAMINECT/DISCECTOMY, LUMBAR  01/20/10    L4-5     LAPAROSCOPIC CHOLECYSTECTOMY N/A 2023    Procedure: CHOLECYSTECTOMY, LAPAROSCOPIC;  Surgeon: Adryan Gaitan MD;  [facility]     OPERATIVE HYSTEROSCOPY WITH MORCELLATOR N/A 2019    Procedure: Diagnostic hysteroscopy, myosure myomectomy;  Surgeon: Viral Hodges MD;  [facility]     TUBAL LIGATION        No Known Allergies   Social History     Tobacco Use     Smoking status: Former     Packs/day: 0.00     Years: 10.00     Pack years: 0.00     Types: Cigarettes     Smokeless tobacco: Never   Substance Use Topics     Alcohol use: Not Currently     Comment: Very Rare      Wt Readings from Last 1 Encounters:   23 112.5 kg (248 lb)        Anesthesia Evaluation   Pt has had prior anesthetic. Type: General.        ROS/MED HX  ENT/Pulmonary:  - neg pulmonary ROS     Neurologic:  - neg neurologic ROS     Cardiovascular:     (+) -----Previous cardiac testing   Echo: Date:  Results:    Stress Test: Date: Results:    ECG Reviewed: Date: 12/30/2022 Results:  SR  Cath: Date: Results:      METS/Exercise Tolerance:     Hematologic:  - neg hematologic  ROS     Musculoskeletal: Comment: Herniated nucleus pulposus, L5-S1, right    Lumbar radiculopathy    Tendinitis of left wrist    Right shoulder pain      GI/Hepatic: Comment: Constipation, unspecified constipation type    (+) GERD, Asymptomatic on medication,     Renal/Genitourinary:  - neg Renal ROS     Endo:     (+) Obesity,     Psychiatric/Substance Use:     (+) psychiatric history depression     Infectious Disease:  - neg infectious disease ROS     Malignancy:  - neg malignancy ROS     Other:  - neg other ROS          Physical Exam    Airway  airway exam normal      Mallampati: II   TM distance: > 3 FB   Neck ROM: full   Mouth opening: > 3 cm    Respiratory Devices and Support         Dental  no notable dental history         Cardiovascular   cardiovascular exam normal       Rhythm and rate: regular and normal     Pulmonary   pulmonary exam normal        breath sounds clear to auscultation           OUTSIDE LABS:  CBC:   Lab Results   Component Value Date    WBC 10.2 01/17/2023    WBC 10.6 12/30/2022    HGB 12.2 01/17/2023    HGB 12.8 12/30/2022    HCT 38.4 01/17/2023    HCT 38.8 12/30/2022     01/17/2023     12/30/2022     BMP:   Lab Results   Component Value Date     01/17/2023     12/30/2022    POTASSIUM 4.3 01/17/2023    POTASSIUM 3.8 12/30/2022    CHLORIDE 106 01/17/2023    CHLORIDE 109 12/30/2022    CO2 25 01/17/2023    CO2 26 12/30/2022    BUN 13.9 01/17/2023    BUN 14 12/30/2022    CR 0.86 01/17/2023    CR 0.86 12/30/2022    GLC 98 01/17/2023    GLC 83 12/30/2022     COAGS:   Lab Results   Component Value Date    INR 0.96 01/21/2010     POC:   Lab Results   Component Value Date    HCG Negative 08/08/2012     HEPATIC:   Lab Results   Component Value Date    ALBUMIN 3.8 01/17/2023    PROTTOTAL 6.4  01/17/2023    ALT 14 01/17/2023    AST 9 (L) 01/17/2023    ALKPHOS 48 01/17/2023    BILITOTAL <0.2 01/17/2023     OTHER:   Lab Results   Component Value Date    KAMARI 9.3 01/17/2023    PHOS 3.1 01/21/2010    MAG 2.1 01/21/2010    LIPASE 55 01/17/2023    TSH 1.67 07/08/2021    SED 47 (H) 01/19/2010       Anesthesia Plan    ASA Status:  3   NPO Status:  NPO Appropriate    Anesthesia Type: MAC.   Induction: Intravenous, Propofol.   Maintenance: TIVA.        Consents    Anesthesia Plan(s) and associated risks, benefits, and realistic alternatives discussed. Questions answered and patient/representative(s) expressed understanding.    - Discussed:     - Discussed with:  Patient      - Extended Intubation/Ventilatory Support Discussed: No.      - Patient is DNR/DNI Status: No    Use of blood products discussed: No .     Postoperative Care    Pain management: Oral pain medications.   PONV prophylaxis: Background Propofol Infusion     Comments:    Other Comments: The risks and benefits of anesthesia, and the alternatives where applicable, have been discussed with the patient, and they wish to proceed.                BINU Cuevas CRNA

## 2023-01-24 NOTE — DISCHARGE INSTRUCTIONS
[facility]    Home Care Following Endoscopy          Activity:  You have just undergone an endoscopic procedure usually performed with conscious sedation.  Do not work or operate machinery (including a car) for at least 12 hours.    I encourage you to walk and attempt to pass this air as soon as possible.    Diet:  Return to the diet you were on before your procedure but eat lightly for the first 12-24 hours.  Drink plenty of water.  Resume any regular medications unless otherwise advised by your physician.  Please begin any new medication prescribed as a result of your procedure as directed by your physician.   If you had any biopsy or polyp removed please refrain from aspirin or aspirin products for 2 days.  If on Coumadin please restart as instructed by your physician.   Pain:  You may take Tylenol as needed for pain.  Expected Recovery:  You can expect some mild abdominal fullness and/or discomfort due to the air used to inflate your intestinal tract. It is also normal to have a mild sore throat after upper endoscopy.    Call Your Physician if You Have:  After Upper Endoscopy:  Shoulder, back or chest pain.  Difficulty breathing or swallowing.  Vomiting blood.  Any questions or concerns about your recovery, please call [phone] or after hours [phone] Nurse Advice Line.    Follow-up Care:  You did have polyps/biopsy tissue sample(s) removed.  The polyps/biopsy tissue sample(s) will be sent to pathology.  You should receive letter in your MyChart from Dr. Gaitan with your results within 1-2 weeks. If you do not participate in My Chart a physical letter will come in the mail in 2-3 weeks.  Please call if you have not received a notification of your results.  If asked to return to clinic please make an appointment 1 week after your procedure.  Call [phone].

## 2023-01-26 LAB
PATH REPORT.COMMENTS IMP SPEC: NORMAL
PATH REPORT.FINAL DX SPEC: NORMAL
PATH REPORT.GROSS SPEC: NORMAL
PATH REPORT.MICROSCOPIC SPEC OTHER STN: NORMAL
PATH REPORT.RELEVANT HX SPEC: NORMAL
PHOTO IMAGE: NORMAL

## 2023-01-29 ENCOUNTER — MYC MEDICAL ADVICE (OUTPATIENT)
Dept: SURGERY | Facility: CLINIC | Age: 53
End: 2023-01-29
Payer: COMMERCIAL

## 2023-01-29 DIAGNOSIS — Z12.11 COLON CANCER SCREENING: Primary | ICD-10-CM

## 2023-01-31 NOTE — TELEPHONE ENCOUNTER
Spoke to patient and reviewed pathology from EGD.  There is a concern for Price's.  I did discuss that she will need a repeat EGD in 2 years.  She is in agreement with that plan.  She still has the fist like epigastric symptoms.  She is also under a lot of stress at work.  I did explain to her that some of that some of her symptoms may be stress related but that is a diagnosis of exclusion.  She is in a try to make some work and dietary modifications to see if there is improvement.  She also tells me she needs a colonoscopy.  She will be scheduled for that.    The procedure, risks, benefits, and alternatives were discussed and the patient agrees to proceed.

## 2023-02-09 ENCOUNTER — HOSPITAL ENCOUNTER (OUTPATIENT)
Dept: MAMMOGRAPHY | Facility: CLINIC | Age: 53
Discharge: HOME | End: 2023-02-09
Admitting: RADIOLOGY
Payer: COMMERCIAL

## 2023-02-09 DIAGNOSIS — Z12.31 VISIT FOR SCREENING MAMMOGRAM: ICD-10-CM

## 2023-02-09 PROCEDURE — 77067 SCR MAMMO BI INCL CAD: CPT

## 2023-02-14 ENCOUNTER — TELEPHONE (OUTPATIENT)
Dept: SURGERY | Facility: CLINIC | Age: 53
End: 2023-02-14
Payer: COMMERCIAL

## 2023-02-14 NOTE — TELEPHONE ENCOUNTER
There is a GI order placed in chart.  [facility] has sent patient two [employer] message to call them to schedule.

## 2023-02-14 NOTE — TELEPHONE ENCOUNTER
Reason for Call:  Other appointment    Detailed comments: patient sent a message about getting colonoscopy done with Dr. Gaitan. Sending to surgery team to make sure case request has been put in for this.     Phone Number Patient can be reached at: Home number on file [phone] (home)    Best Time: any    Can we leave a detailed message on this number? YES    Call taken on 2/14/2023 at 2:49 PM by Kerrie Ramírez

## 2023-03-14 ENCOUNTER — HOSPITAL ENCOUNTER (OUTPATIENT)
Dept: GENERAL RADIOLOGY | Facility: CLINIC | Age: 53
Discharge: HOME | End: 2023-03-14
Attending: FAMILY MEDICINE | Admitting: FAMILY MEDICINE
Payer: COMMERCIAL

## 2023-03-14 ENCOUNTER — OFFICE VISIT (OUTPATIENT)
Dept: FAMILY MEDICINE | Facility: CLINIC | Age: 53
End: 2023-03-14
Payer: COMMERCIAL

## 2023-03-14 VITALS
BODY MASS INDEX: 37.08 KG/M2 | HEIGHT: 69 IN | HEART RATE: 71 BPM | SYSTOLIC BLOOD PRESSURE: 122 MMHG | TEMPERATURE: 97.3 F | WEIGHT: 250.38 LBS | OXYGEN SATURATION: 99 % | DIASTOLIC BLOOD PRESSURE: 68 MMHG | RESPIRATION RATE: 18 BRPM

## 2023-03-14 DIAGNOSIS — M75.21 BICEPS TENDONITIS, RIGHT: ICD-10-CM

## 2023-03-14 DIAGNOSIS — G89.29 CHRONIC RIGHT SHOULDER PAIN: Primary | ICD-10-CM

## 2023-03-14 DIAGNOSIS — M25.511 CHRONIC RIGHT SHOULDER PAIN: Primary | ICD-10-CM

## 2023-03-14 DIAGNOSIS — G89.29 CHRONIC RIGHT SHOULDER PAIN: ICD-10-CM

## 2023-03-14 DIAGNOSIS — M25.511 CHRONIC RIGHT SHOULDER PAIN: ICD-10-CM

## 2023-03-14 DIAGNOSIS — Z12.11 COLON CANCER SCREENING: ICD-10-CM

## 2023-03-14 PROCEDURE — 73030 X-RAY EXAM OF SHOULDER: CPT | Mod: RT

## 2023-03-14 PROCEDURE — 96127 BRIEF EMOTIONAL/BEHAV ASSMT: CPT | Performed by: FAMILY MEDICINE

## 2023-03-14 PROCEDURE — 99213 OFFICE O/P EST LOW 20 MIN: CPT | Performed by: FAMILY MEDICINE

## 2023-03-14 ASSESSMENT — PAIN SCALES - GENERAL: PAINLEVEL: MILD PAIN (3)

## 2023-03-14 ASSESSMENT — PATIENT HEALTH QUESTIONNAIRE - PHQ9
10. IF YOU CHECKED OFF ANY PROBLEMS, HOW DIFFICULT HAVE THESE PROBLEMS MADE IT FOR YOU TO DO YOUR WORK, TAKE CARE OF THINGS AT HOME, OR GET ALONG WITH OTHER PEOPLE: NOT DIFFICULT AT ALL
SUM OF ALL RESPONSES TO PHQ QUESTIONS 1-9: 1
SUM OF ALL RESPONSES TO PHQ QUESTIONS 1-9: 1

## 2023-03-14 NOTE — RESULT ENCOUNTER NOTE
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  No other bony issues noted.  I recommend starting with physical therapy as we discussed and then let me know if no improvement after a month.  Please let me know if you have any questions.    Sincerely,  Dr. Garcia

## 2023-03-14 NOTE — PROGRESS NOTES
"  Assessment & Plan     ASSESSMENT/ORDERS:    ICD-10-CM    1. Chronic right shoulder pain  M25.511 XR Shoulder Right G/E 3 Views    G89.29 Physical Therapy Referral      2. Biceps tendonitis, right  M75.21 XR Shoulder Right G/E 3 Views     Physical Therapy Referral      3. Colon cancer screening  Z12.11 Fecal colorectal cancer screen (FIT)     Fecal colorectal cancer screen (FIT)        PLAN:  1.  Discussed cause of pain likely osteoarthritis of acromioclavicular  joint and biceps tendinitis. Right shoulder x-ray with plan to complete physical therapy. Patient will return if pain does not improve in 4 weeks.   2. Discussed colon cancer screening and patient will complete FIT test. Encouraged patient to schedule wellness visit for preventive care.                BMI:   Estimated body mass index is 36.97 kg/m  as calculated from the following:    Height as of this encounter: 1.753 m (5' 9\").    Weight as of this encounter: 113.6 kg (250 lb 6 oz).           Return in about 4 weeks (around 4/11/2023) for recheck if symptoms worsen or fail to improve.      Patient was seen and examined by myself and Dallas Garcia MD. The note was then scribed by me.    Michaela Rolon, MS3  [employer]       This patient was seen and examined by myself as well as the medical student.  The medical student scribed the note and I have reviewed it, edited it appropriately, and agree with the final documentation.       Dallas Garcia MD  [facility] SALOMON Agosto is a 52 year old, presenting for the following health issues:  Shoulder Pain    Patient with several year history of right shoulder pain that has worsened in the last few months. Now noticing a sharp pain on the front of her shoulder below her clavicle and on the back of her shoulder blade. Has also noticed numbness and tingling on the back of her upper arm and sometimes into her pinky. Pain worsens with activity " "and she has been avoiding lifting or shoveling with her right arm. No known history of injury to her right arm or shoulder. Tylenol and ibuprofen have been minimally helpful.     Shoulder Pain    History of Present Illness       Reason for visit:  Shoulder pain  Symptom onset:  More than a month  Symptoms include:  Pain in shoulder  Symptom intensity:  Moderate  Symptom progression:  Worsening  Had these symptoms before:  No  What makes it worse:  Activity  What makes it better:  Less activity    She eats 0-1 servings of fruits and vegetables daily.She consumes 0 sweetened beverage(s) daily.She exercises with enough effort to increase her heart rate 9 or less minutes per day.  She exercises with enough effort to increase her heart rate 3 or less days per week.   She is taking medications regularly.    Today's PHQ-9         PHQ-9 Total Score: 1    PHQ-9 Q9 Thoughts of better off dead/self-harm past 2 weeks :   Not at all    How difficult have these problems made it for you to do your work, take care of things at home, or get along with other people: Not difficult at all             Review of Systems         Objective    /68   Pulse 71   Temp 97.3  F (36.3  C) (Temporal)   Resp 18   Ht 1.753 m (5' 9\")   Wt 113.6 kg (250 lb 6 oz)   SpO2 99%   BMI 36.97 kg/m    Body mass index is 36.97 kg/m .  Physical Exam  Constitutional:       Appearance: She is well-developed.   Cardiovascular:      Heart sounds: S1 normal and S2 normal.   Musculoskeletal:      Right shoulder: Tenderness (proximal biceps tendon) present. No swelling or deformity. Normal range of motion.      Comments: Right shoulder with full range of motion and strength with internal and external rotation. External rotation does reproduce pain. Pain also present with cross body abduction. Impingement test negative. Strength 5/5 in all directions   Neurological:      Mental Status: She is alert.           No results found for this or any previous visit " (from the past 24 hour(s)).

## 2023-03-14 NOTE — PROGRESS NOTES
"  Assessment & Plan     ASSESSMENT/ORDERS:    ICD-10-CM    1. Chronic right shoulder pain  M25.511 XR Shoulder Right G/E 3 Views    G89.29 Physical Therapy Referral      2. Biceps tendonitis, right  M75.21 XR Shoulder Right G/E 3 Views     Physical Therapy Referral      3. Colon cancer screening  Z12.11 Fecal colorectal cancer screen (FIT)        PLAN:  1.  Discussed cause of pain likely osteoarthritis of acromioclavicular  joint and biceps tendinitis. Right shoulder x-ray with plan to complete physical therapy. Patient will return if pain does not improve in 4 weeks.   2. Discussed colon cancer screening and patient will complete FIT test. Encouraged patient to schedule wellness visit for preventive care.                BMI:   Estimated body mass index is 36.97 kg/m  as calculated from the following:    Height as of this encounter: 1.753 m (5' 9\").    Weight as of this encounter: 113.6 kg (250 lb 6 oz).           Return in about 4 weeks (around 4/11/2023) for recheck if symptoms worsen or fail to improve.      Patient was seen and examined by myself and Dallas Garcia MD. The note was then scribed by me.    Michaela Rolon, MS3  [employer]       This patient was seen and examined by myself as well as the medical student.  The medical student scribed the note and I have reviewed it, edited it appropriately, and agree with the final documentation.       Dallas Garcia MD  [facility] SALOMON Agosto is a 52 year old, presenting for the following health issues:  Shoulder Pain    Patient with several year history of right shoulder pain that has worsened in the last few months. Now noticing a sharp pain on the front of her shoulder below her clavicle and on the back of her shoulder blade. Has also noticed numbness and tingling on the back of her upper arm and sometimes into her pinky. Pain worsens with activity and she has been avoiding lifting or " "shoveling with her right arm. No known history of injury to her right arm or shoulder. Tylenol and ibuprofen have been minimally helpful.     Shoulder Pain    History of Present Illness       Reason for visit:  Shoulder pain  Symptom onset:  More than a month  Symptoms include:  Pain in shoulder  Symptom intensity:  Moderate  Symptom progression:  Worsening  Had these symptoms before:  No  What makes it worse:  Activity  What makes it better:  Less activity    She eats 0-1 servings of fruits and vegetables daily.She consumes 0 sweetened beverage(s) daily.She exercises with enough effort to increase her heart rate 9 or less minutes per day.  She exercises with enough effort to increase her heart rate 3 or less days per week.   She is taking medications regularly.    Today's PHQ-9         PHQ-9 Total Score: 1    PHQ-9 Q9 Thoughts of better off dead/self-harm past 2 weeks :   Not at all    How difficult have these problems made it for you to do your work, take care of things at home, or get along with other people: Not difficult at all             Review of Systems         Objective    /68   Pulse 71   Temp 97.3  F (36.3  C) (Temporal)   Resp 18   Ht 1.753 m (5' 9\")   Wt 113.6 kg (250 lb 6 oz)   SpO2 99%   BMI 36.97 kg/m    Body mass index is 36.97 kg/m .  Physical Exam  Constitutional:       Appearance: She is well-developed.   Cardiovascular:      Heart sounds: S1 normal and S2 normal.   Musculoskeletal:      Right shoulder: Tenderness (proximal biceps tendon) present. No swelling or deformity. Normal range of motion.      Comments: Right shoulder with full range of motion and strength with internal and external rotation. External rotation does reproduce pain. Pain also present with cross body abduction. Impingement test negative. Strength 5/5 in all directions   Neurological:      Mental Status: She is alert.           No results found for this or any previous visit (from the past 24 hour(s)).        "

## 2023-03-17 ENCOUNTER — HOSPITAL ENCOUNTER (OUTPATIENT)
Dept: PHYSICAL THERAPY | Facility: CLINIC | Age: 53
Setting detail: THERAPIES SERIES
Discharge: HOME | End: 2023-03-17
Attending: FAMILY MEDICINE
Payer: COMMERCIAL

## 2023-03-17 DIAGNOSIS — M25.511 CHRONIC RIGHT SHOULDER PAIN: ICD-10-CM

## 2023-03-17 DIAGNOSIS — G89.29 CHRONIC RIGHT SHOULDER PAIN: ICD-10-CM

## 2023-03-17 DIAGNOSIS — M75.21 BICEPS TENDONITIS, RIGHT: ICD-10-CM

## 2023-03-17 PROCEDURE — 97140 MANUAL THERAPY 1/> REGIONS: CPT | Mod: GP | Performed by: PHYSICAL THERAPIST

## 2023-03-17 PROCEDURE — 97112 NEUROMUSCULAR REEDUCATION: CPT | Mod: GP | Performed by: PHYSICAL THERAPIST

## 2023-03-17 PROCEDURE — 97161 PT EVAL LOW COMPLEX 20 MIN: CPT | Mod: GP | Performed by: PHYSICAL THERAPIST

## 2023-03-17 PROCEDURE — 97110 THERAPEUTIC EXERCISES: CPT | Mod: GP | Performed by: PHYSICAL THERAPIST

## 2023-03-17 PROCEDURE — 97530 THERAPEUTIC ACTIVITIES: CPT | Mod: GP | Performed by: PHYSICAL THERAPIST

## 2023-03-31 ENCOUNTER — HOSPITAL ENCOUNTER (OUTPATIENT)
Dept: PHYSICAL THERAPY | Facility: CLINIC | Age: 53
Setting detail: THERAPIES SERIES
Discharge: HOME | End: 2023-03-31
Attending: FAMILY MEDICINE
Payer: COMMERCIAL

## 2023-03-31 PROCEDURE — 97112 NEUROMUSCULAR REEDUCATION: CPT | Mod: GP | Performed by: PHYSICAL THERAPIST

## 2023-03-31 PROCEDURE — 97140 MANUAL THERAPY 1/> REGIONS: CPT | Mod: GP | Performed by: PHYSICAL THERAPIST

## 2023-03-31 PROCEDURE — 97110 THERAPEUTIC EXERCISES: CPT | Mod: GP | Performed by: PHYSICAL THERAPIST

## 2023-04-03 NOTE — PROGRESS NOTES
03/17/23 1100   Signing Clinician's Name / Credentials   Signing clinician's name / credentials Kim Self, PT, DPT, OCS, CSCS   Session Number   Session Number 1   Additional Session Number [employer]   Authorization status no cert   Adult Goals   PT Ortho Eval Goals 1;2;3   Ortho Goal 1   Goal Identifier 1   Goal Description The patient will report 50% of the day will be pain free in order to improve tolerance of household chores.   Target Date 05/26/23   Ortho Goal 2   Goal Identifier 2   Goal Description The patient will be able to demonstrate improved strength to 4+/5 without pain to improve tolerance of lifting   Target Date 05/01/23   Ortho Goal 3   Goal Identifier 3   Goal Description The patient will improve SPADI to <10% limitation in order to improve tolerance of functional activities   Target Date 05/26/23   Subjective Report   Subjective Report See eval   Treatment Interventions   Interventions Neuromuscular Re-education;Manual Therapy;Therapeutic Activity;Therapeutic Procedure/Exercise   Therapeutic Procedure/exercise   Therapeutic Procedures: strength, endurance, ROM, flexibillity minutes (45599) 10   Skilled Intervention stretches   Patient Response Pt tolerated well and felt appropriate relief   Treatment Detail stretch (B) UT 2 x 30sec, (B) SCM  2 x 30sec, pectoralis stretch doorway 2 x 30 sec   Therapeutic Activity   Therapeutic Activities: dynamic activities to improve functional performance minutes (14822) 10   Skilled Intervention education   Patient Response Pt reports understanding, provided with HEP   Treatment Detail desk setup for appropriate ergonomic consideration, habit changes for posture- ideas to assist with success   Neuromuscular Re-education   Neuromuscular re-ed of mvmt, balance, coord, kinesthetic sense, posture, proprioception minutes (23050) 15   Skilled Intervention education   Patient Response Pt reports understanding   Treatment Detail postural education and association  with rib and shoulder symptoms   Manual Therapy   Manual Therapy: Mobilization, MFR, MLD, friction massage minutes (96042) 10   Skilled Intervention STM, mobilization   Patient Response Pt tolerated well with decreased TTP following   Treatment Detail MET for (R) 1st rib dysfunction, MET for (R) posterior 5 rib; STM to (R) infraspinatus, middle trap, UT, pectoralis major   Education   Learner Patient   Readiness Eager;Acceptance   Method Booklet/handout;Explanation;Demonstration   Response Verbalizes Understanding;Demonstrates Understanding   Education Comments ptrx; desk ergonomics   Plan   Homework desk ergonomics, posture   Home program SCM, UT, pectoralis stretch   Plan for next session manual techniques and MET as required   Total Session Time   Timed Code Treatment Minutes 45   Total Treatment Time (sum of timed and untimed services) 60   Medicare Claim Information   Medical Diagnosis Chronic right shoulder pain  Biceps tendonitis, right   PT Diagnosis shoulder pain   Start of Care Date 03/17/23   Onset date of current episode/exacerbation 06/01/20             Kim Self, PT, DPT, OCS, CSCS  [facility]  [phone]

## 2023-04-07 ENCOUNTER — HOSPITAL ENCOUNTER (OUTPATIENT)
Dept: PHYSICAL THERAPY | Facility: CLINIC | Age: 53
Setting detail: THERAPIES SERIES
Discharge: HOME | End: 2023-04-07
Attending: FAMILY MEDICINE
Payer: COMMERCIAL

## 2023-04-07 PROCEDURE — 97140 MANUAL THERAPY 1/> REGIONS: CPT | Mod: GP | Performed by: PHYSICAL THERAPIST

## 2023-04-07 PROCEDURE — 97110 THERAPEUTIC EXERCISES: CPT | Mod: GP | Performed by: PHYSICAL THERAPIST

## 2023-05-17 NOTE — PROGRESS NOTES
[facility]    Outpatient Physical Therapy Discharge Note  Patient: Triny Bower  : 1970    Beginning/End Dates of Reporting Period:  3/17/23 to 23    Referring Provider: Dallas Garcia MD    Therapy Diagnosis: shoulder pain     Client Self Report: Pt reports pain is more on lateral R shoulder at this time.  Occurs with throwing and window washing.    Objective Measurements:       Goals:  Unknown status as patient did not return  Goal Identifier 1   Goal Description The patient will report 50% of the day will be pain free in order to improve tolerance of household chores.   Target Date 23   Date Met      Progress (detail required for progress note):       Goal Identifier 2   Goal Description The patient will be able to demonstrate improved strength to 4+/5 without pain to improve tolerance of lifting   Target Date 23   Date Met      Progress (detail required for progress note):       Goal Identifier 3   Goal Description The patient will improve SPADI to <10% limitation in order to improve tolerance of functional activities   Target Date 23   Date Met      Progress (detail required for progress note):       Plan:  Discharge from therapy.    Discharge:    Reason for Discharge: Patient has failed to schedule further appointments.    Equipment Issued:     Discharge Plan: Patient to continue home program.        Kim Self, PT, DPT, OCS, CSCS  [facility]  [phone]

## 2023-09-24 ENCOUNTER — HEALTH MAINTENANCE LETTER (OUTPATIENT)
Age: 53
End: 2023-09-24

## 2024-04-10 ENCOUNTER — E-VISIT (OUTPATIENT)
Dept: FAMILY MEDICINE | Facility: CLINIC | Age: 54
End: 2024-04-10
Payer: COMMERCIAL

## 2024-04-10 DIAGNOSIS — Z53.9 DIAGNOSIS NOT YET DEFINED: Primary | ICD-10-CM

## 2024-04-11 ENCOUNTER — TELEPHONE (OUTPATIENT)
Dept: SCHEDULING | Facility: CLINIC | Age: 54
End: 2024-04-11
Payer: COMMERCIAL

## 2024-04-11 NOTE — TELEPHONE ENCOUNTER
I am unable to work her in.  She will need to see someone who has sooner availability to discuss.  Will have staff notify patient and give her options here.    Dallas Garcia MD

## 2024-04-11 NOTE — TELEPHONE ENCOUNTER
Provider E-Visit time total (minutes): patient needs telephone, video, or office-based visit to address this issue.    Dallas Garcia MD

## 2024-04-11 NOTE — CONFIDENTIAL NOTE
Reason for Call:  Appointment Request    Patient requesting this type of appt:  Triny PETERSON    Requested provider:  Dr. Dallas Garcia    Reason patient unable to be scheduled:  Pt had an evisit with Dr. Garcia and he suggested an in person visit. He is currently booked until Sept 3rd, pt wants something sooner    When does patient want to be seen/preferred time:  As soon as possible    Comments: F/U to an evisit    Could we [facility] this information to you in [facility] or would you prefer to receive a phone call?:   Patient would prefer a phone call   Okay to leave a detailed message?: Yes at Cell number on file:    Telephone Information:   Mobile [phone]       Call taken on 4/11/2024 at 9:15 AM by Karl Villalobos

## 2024-04-11 NOTE — PATIENT INSTRUCTIONS
Thank you for choosing us for your care. I think an in-clinic visit would be best next steps based on your symptoms. Please schedule a clinic appointment; you won t be charged for this eVisit.      You can schedule an appointment right here in [facility], or call [phone]

## 2024-07-16 ENCOUNTER — OFFICE VISIT (OUTPATIENT)
Dept: FAMILY MEDICINE | Facility: OTHER | Age: 54
End: 2024-07-16
Payer: COMMERCIAL

## 2024-07-16 ENCOUNTER — ORDERS ONLY (AUTO-RELEASED) (OUTPATIENT)
Dept: FAMILY MEDICINE | Facility: OTHER | Age: 54
End: 2024-07-16

## 2024-07-16 VITALS
DIASTOLIC BLOOD PRESSURE: 72 MMHG | HEART RATE: 73 BPM | TEMPERATURE: 97.2 F | OXYGEN SATURATION: 98 % | SYSTOLIC BLOOD PRESSURE: 110 MMHG | HEIGHT: 69 IN | BODY MASS INDEX: 37.92 KG/M2 | RESPIRATION RATE: 16 BRPM | WEIGHT: 256 LBS

## 2024-07-16 DIAGNOSIS — Z12.11 SCREEN FOR COLON CANCER: ICD-10-CM

## 2024-07-16 DIAGNOSIS — M25.50 ARTHRALGIA, UNSPECIFIED JOINT: ICD-10-CM

## 2024-07-16 DIAGNOSIS — Z12.4 CERVICAL CANCER SCREENING: ICD-10-CM

## 2024-07-16 DIAGNOSIS — E66.01 MORBID OBESITY (H): ICD-10-CM

## 2024-07-16 DIAGNOSIS — R53.83 OTHER FATIGUE: ICD-10-CM

## 2024-07-16 DIAGNOSIS — F33.0 MAJOR DEPRESSIVE DISORDER, RECURRENT EPISODE, MILD (H): ICD-10-CM

## 2024-07-16 DIAGNOSIS — Z00.00 ROUTINE GENERAL MEDICAL EXAMINATION AT A HEALTH CARE FACILITY: Primary | ICD-10-CM

## 2024-07-16 DIAGNOSIS — N95.2 ATROPHIC VAGINITIS: ICD-10-CM

## 2024-07-16 DIAGNOSIS — E78.5 HYPERLIPIDEMIA LDL GOAL <130: ICD-10-CM

## 2024-07-16 LAB
ALBUMIN SERPL BCG-MCNC: 4.1 G/DL (ref 3.5–5.2)
ALP SERPL-CCNC: 50 U/L (ref 40–150)
ALT SERPL W P-5'-P-CCNC: 19 U/L (ref 0–50)
ANION GAP SERPL CALCULATED.3IONS-SCNC: 8 MMOL/L (ref 7–15)
AST SERPL W P-5'-P-CCNC: 15 U/L (ref 0–45)
BILIRUB SERPL-MCNC: 0.4 MG/DL
BUN SERPL-MCNC: 11.8 MG/DL (ref 6–20)
CALCIUM SERPL-MCNC: 9.4 MG/DL (ref 8.8–10.4)
CHLORIDE SERPL-SCNC: 104 MMOL/L (ref 98–107)
CHOLEST SERPL-MCNC: 207 MG/DL
CREAT SERPL-MCNC: 0.93 MG/DL (ref 0.51–0.95)
EGFRCR SERPLBLD CKD-EPI 2021: 73 ML/MIN/1.73M2
ERYTHROCYTE [DISTWIDTH] IN BLOOD BY AUTOMATED COUNT: 14.4 % (ref 10–15)
FASTING STATUS PATIENT QL REPORTED: NO
FASTING STATUS PATIENT QL REPORTED: NO
FERRITIN SERPL-MCNC: 51 NG/ML (ref 11–328)
FOLATE SERPL-MCNC: 11.2 NG/ML (ref 4.6–34.8)
FSH SERPL IRP2-ACNC: 6.8 MIU/ML
GLUCOSE SERPL-MCNC: 94 MG/DL (ref 70–99)
HCO3 SERPL-SCNC: 26 MMOL/L (ref 22–29)
HCT VFR BLD AUTO: 39.8 % (ref 35–47)
HDLC SERPL-MCNC: 58 MG/DL
HGB BLD-MCNC: 13.4 G/DL (ref 11.7–15.7)
LDLC SERPL CALC-MCNC: 125 MG/DL
MCH RBC QN AUTO: 28.4 PG (ref 26.5–33)
MCHC RBC AUTO-ENTMCNC: 33.7 G/DL (ref 31.5–36.5)
MCV RBC AUTO: 84 FL (ref 78–100)
NONHDLC SERPL-MCNC: 149 MG/DL
PLATELET # BLD AUTO: 429 10E3/UL (ref 150–450)
POTASSIUM SERPL-SCNC: 4.1 MMOL/L (ref 3.4–5.3)
PROT SERPL-MCNC: 7.2 G/DL (ref 6.4–8.3)
RBC # BLD AUTO: 4.72 10E6/UL (ref 3.8–5.2)
SODIUM SERPL-SCNC: 138 MMOL/L (ref 135–145)
TRIGL SERPL-MCNC: 121 MG/DL
TSH SERPL DL<=0.005 MIU/L-ACNC: 2.58 UIU/ML (ref 0.3–4.2)
VIT B12 SERPL-MCNC: 680 PG/ML (ref 232–1245)
VIT D+METAB SERPL-MCNC: 66 NG/ML (ref 20–50)
WBC # BLD AUTO: 9.6 10E3/UL (ref 4–11)

## 2024-07-16 PROCEDURE — 36415 COLL VENOUS BLD VENIPUNCTURE: CPT | Performed by: PHYSICIAN ASSISTANT

## 2024-07-16 PROCEDURE — 99214 OFFICE O/P EST MOD 30 MIN: CPT | Mod: 25 | Performed by: PHYSICIAN ASSISTANT

## 2024-07-16 PROCEDURE — 82306 VITAMIN D 25 HYDROXY: CPT | Performed by: PHYSICIAN ASSISTANT

## 2024-07-16 PROCEDURE — 83001 ASSAY OF GONADOTROPIN (FSH): CPT | Performed by: PHYSICIAN ASSISTANT

## 2024-07-16 PROCEDURE — 86038 ANTINUCLEAR ANTIBODIES: CPT | Performed by: PHYSICIAN ASSISTANT

## 2024-07-16 PROCEDURE — 82607 VITAMIN B-12: CPT | Performed by: PHYSICIAN ASSISTANT

## 2024-07-16 PROCEDURE — 99396 PREV VISIT EST AGE 40-64: CPT | Performed by: PHYSICIAN ASSISTANT

## 2024-07-16 PROCEDURE — 80061 LIPID PANEL: CPT | Performed by: PHYSICIAN ASSISTANT

## 2024-07-16 PROCEDURE — 80053 COMPREHEN METABOLIC PANEL: CPT | Performed by: PHYSICIAN ASSISTANT

## 2024-07-16 PROCEDURE — 84443 ASSAY THYROID STIM HORMONE: CPT | Performed by: PHYSICIAN ASSISTANT

## 2024-07-16 PROCEDURE — 87624 HPV HI-RISK TYP POOLED RSLT: CPT | Performed by: PHYSICIAN ASSISTANT

## 2024-07-16 PROCEDURE — G0145 SCR C/V CYTO,THINLAYER,RESCR: HCPCS | Performed by: PHYSICIAN ASSISTANT

## 2024-07-16 PROCEDURE — 82728 ASSAY OF FERRITIN: CPT | Performed by: PHYSICIAN ASSISTANT

## 2024-07-16 PROCEDURE — 85027 COMPLETE CBC AUTOMATED: CPT | Performed by: PHYSICIAN ASSISTANT

## 2024-07-16 PROCEDURE — 82746 ASSAY OF FOLIC ACID SERUM: CPT | Performed by: PHYSICIAN ASSISTANT

## 2024-07-16 RX ORDER — TOPIRAMATE 25 MG/1
TABLET, FILM COATED ORAL
Qty: 84 TABLET | Refills: 0 | Status: SHIPPED | OUTPATIENT
Start: 2024-07-16 | End: 2024-08-08

## 2024-07-16 SDOH — HEALTH STABILITY: PHYSICAL HEALTH: ON AVERAGE, HOW MANY DAYS PER WEEK DO YOU ENGAGE IN MODERATE TO STRENUOUS EXERCISE (LIKE A BRISK WALK)?: 3 DAYS

## 2024-07-16 ASSESSMENT — PAIN SCALES - GENERAL: PAINLEVEL: NO PAIN (0)

## 2024-07-16 ASSESSMENT — PATIENT HEALTH QUESTIONNAIRE - PHQ9
SUM OF ALL RESPONSES TO PHQ QUESTIONS 1-9: 4
10. IF YOU CHECKED OFF ANY PROBLEMS, HOW DIFFICULT HAVE THESE PROBLEMS MADE IT FOR YOU TO DO YOUR WORK, TAKE CARE OF THINGS AT HOME, OR GET ALONG WITH OTHER PEOPLE: NOT DIFFICULT AT ALL
SUM OF ALL RESPONSES TO PHQ QUESTIONS 1-9: 4

## 2024-07-16 ASSESSMENT — SOCIAL DETERMINANTS OF HEALTH (SDOH): HOW OFTEN DO YOU GET TOGETHER WITH FRIENDS OR RELATIVES?: ONCE A WEEK

## 2024-07-16 NOTE — PATIENT INSTRUCTIONS
Patient Education     - Start Topamax as prescribed   - Recheck 1 month       Check with insurance for coverage of the new shingles vaccine, Shingrix (ask them how old do I need to be and where should I get it done pharmacy or clinic). If it is not covered now, it may be covered later in the year. Shingrix is given in a 2 shot series, between 2 and 6 months apart. It is recommended for adults age 50 and older. Initial studies have indicated that this new vaccine is 85-90% effective at preventing shingles, and is preferred over the old Zostavax vaccine.     Replens (silicone) or Pre-seed (water)   - Place vaginally at night for 2 weeks   - Then find maintenance dosing       Weight loss Medications   Phentermine - only 12 weeks, can cause irregular heartbeats, anxiety, sweating, insomnia   Topamax - anti-seizure, 1 or 2 times a day, same side effects possible as Phentermine, but usually less severe   Qsymia - combination of Phentermine and Topamax   Wellbutrin - antidepressant, helps feel full   Naltrexone - withdrawal medication, helps with metabolism  Contrave - combination of Wellbutrin and Naltrexone   GLP-1 injections - Wegovy (nation wide shortage of this), Zepbound, and Saxenda         Nausea, upset, stomach, vomiting common after injection         Improves with time         Long term - constipation     All require 1 month check ins as doses are increased         Preventive Care Advice   This is general advice given by our system to help you stay healthy. However, your care team may have specific advice just for you. Please talk to your care team about your preventive care needs.  Nutrition  Eat 5 or more servings of fruits and vegetables each day.  Try wheat bread, brown rice and whole grain pasta (instead of white bread, rice, and pasta).  Get enough calcium and vitamin D. Check the label on foods and aim for 100% of the RDA (recommended daily allowance).  Lifestyle  Exercise at least 150 minutes each  week  (30 minutes a day, 5 days a week).  Do muscle strengthening activities 2 days a week. These help control your weight and prevent disease.  No smoking.  Wear sunscreen to prevent skin cancer.  Have a dental exam and cleaning every 6 months.  Yearly exams  See your health care team every year to talk about:  Any changes in your health.  Any medicines your care team has prescribed.  Preventive care, family planning, and ways to prevent chronic diseases.  Shots (vaccines)   HPV shots (up to age 26), if you've never had them before.  Hepatitis B shots (up to age 59), if you've never had them before.  COVID-19 shot: Get this shot when it's due.  Flu shot: Get a flu shot every year.  Tetanus shot: Get a tetanus shot every 10 years.  Pneumococcal, hepatitis A, and RSV shots: Ask your care team if you need these based on your risk.  Shingles shot (for age 50 and up)  General health tests  Diabetes screening:  Starting at age 35, Get screened for diabetes at least every 3 years.  If you are younger than age 35, ask your care team if you should be screened for diabetes.  Cholesterol test: At age 39, start having a cholesterol test every 5 years, or more often if advised.  Bone density scan (DEXA): At age 50, ask your care team if you should have this scan for osteoporosis (brittle bones).  Hepatitis C: Get tested at least once in your life.  STIs (sexually transmitted infections)  Before age 24: Ask your care team if you should be screened for STIs.  After age 24: Get screened for STIs if you're at risk. You are at risk for STIs (including HIV) if:  You are sexually active with more than one person.  You don't use condoms every time.  You or a partner was diagnosed with a sexually transmitted infection.  If you are at risk for HIV, ask about PrEP medicine to prevent HIV.  Get tested for HIV at least once in your life, whether you are at risk for HIV or not.  Cancer screening tests  Cervical cancer screening: If you have  a cervix, begin getting regular cervical cancer screening tests starting at age 21.  Breast cancer scan (mammogram): If you've ever had breasts, begin having regular mammograms starting at age 40. This is a scan to check for breast cancer.  Colon cancer screening: It is important to start screening for colon cancer at age 45.  Have a colonoscopy test every 10 years (or more often if you're at risk) Or, ask your provider about stool tests like a FIT test every year or Cologuard test every 3 years.  To learn more about your testing options, visit:   .  For help making a decision, visit:   https://bit.ly/wd90914.  Prostate cancer screening test: If you have a prostate, ask your care team if a prostate cancer screening test (PSA) at age 55 is right for you.  Lung cancer screening: If you are a current or former smoker ages 50 to 80, ask your care team if ongoing lung cancer screenings are right for you.  For informational purposes only. Not to replace the advice of your health care provider. Copyright   2023 [employer]. All rights reserved. Clinically reviewed by the [facility] Transitions Program. [employer] [id number] - REV 01/24.

## 2024-07-16 NOTE — PROGRESS NOTES
Preventive Care Visit  [facility]  Nena Schmidt PA-C, Family Medicine  Jul 16, 2024    Assessment & Plan     ICD-10-CM    1. Routine general medical examination at a health care facility  Z00.00       2. Cervical cancer screening  Z12.4 Pap Screen with HPV - Recommended Age 30 - 65 Years      3. Screen for colon cancer  Z12.11 COLOGUARD([employer])      4. Hyperlipidemia LDL goal <130  E78.5 Lipid panel reflex to direct LDL Fasting     Lipid panel reflex to direct LDL Fasting     OFFICE/OUTPT VISIT,EST,LEVL IV      5. Major depressive disorder, recurrent episode, mild (H24)  F33.0 OFFICE/OUTPT VISIT,EST,LEVL IV      6. Arthralgia, unspecified joint  M25.50 Follicle stimulating hormone     TSH with free T4 reflex     CBC with platelets     Comprehensive metabolic panel (BMP + Alb, Alk Phos, ALT, AST, Total. Bili, TP)     Vitamin D Deficiency     Ferritin     Anti Nuclear Lurdes IgG by IFA with Reflex     Vitamin B12     Folate     Follicle stimulating hormone     TSH with free T4 reflex     CBC with platelets     Comprehensive metabolic panel (BMP + Alb, Alk Phos, ALT, AST, Total. Bili, TP)     Vitamin D Deficiency     Ferritin     Anti Nuclear Lurdes IgG by IFA with Reflex     Vitamin B12     Folate     OFFICE/OUTPT VISIT,EST,LEVL IV      7. Other fatigue  R53.83 Follicle stimulating hormone     TSH with free T4 reflex     CBC with platelets     Comprehensive metabolic panel (BMP + Alb, Alk Phos, ALT, AST, Total. Bili, TP)     Vitamin D Deficiency     Ferritin     Anti Nuclear Lurdes IgG by IFA with Reflex     Vitamin B12     Folate     Follicle stimulating hormone     TSH with free T4 reflex     CBC with platelets     Comprehensive metabolic panel (BMP + Alb, Alk Phos, ALT, AST, Total. Bili, TP)     Vitamin D Deficiency     Ferritin     Anti Nuclear Lurdes IgG by IFA with Reflex     Vitamin B12     Folate     OFFICE/OUTPT VISIT,EST,LEVL IV      8. Atrophic vaginitis  N95.2  "OFFICE/OUTPT VISIT,EST,LEVL IV      9. Morbid obesity (H)  E66.01 topiramate (TOPAMAX) 25 MG tablet        Patient has been advised of split billing requirements and indicates understanding: Yes    BMI  Estimated body mass index is 37.8 kg/m  as calculated from the following:    Height as of this encounter: 1.753 m (5' 9\").    Weight as of this encounter: 116.1 kg (256 lb).   Weight management plan: Discussed healthy diet and exercise guidelines    Counseling  Appropriate preventive services were discussed with this patient, including applicable screening as appropriate for fall prevention, nutrition, physical activity, Tobacco-use cessation, weight loss and cognition.  Checklist reviewing preventive services available has been given to the patient.  Reviewed patient's diet, addressing concerns and/or questions.   She is at risk for lack of exercise and has been provided with information to increase physical activity for the benefit of her well-being.   The patient was instructed to see the dentist every 6 months.     - Pap collected, await results   - Patient elects cologuard after discussion of various colon cancer screening tests     3. Lipids   - Mild elevation in the past, not requiring medication  - Will continue to monitor, fasting labs collected today, await results     5-9. Perimenopause   - Patient feels all her current symptoms are due to this      Night sweats and mood swings have improved but rest has not   - Discussed vaginal dryness with lubricants, see patient instructions   - Main issue is weight   - Will try Topamax after discussion of various medications   - Already doing well with diet and exercise, encouraged this, should continue this   - See taper plan, recheck monthly   - Arthralgias & fatigue      Discussed could be something underlying, recommend checking labs for underlying causes of these two symptoms including thyroid, anemia, electrolyte or vitamin deficiencies, vs. Other       Await " results     Review of the result(s) of each unique test - See list        2/9/23 - Mammogram      1/17/23 - CMP      7/8/21 - TSH, Glucose, Lipid      7/11/19 - PAP   Diagnosis or treatment significantly limited by social determinants of health - None     50 minutes spent on the date of the encounter doing chart review, history and exam, documentation and further activities as noted above    The patient indicates understanding of these issues and agrees with the plan.    Follow up: 1 month     Dashawn Amaro-KAREY Portillo  [facility] [place]         Subjective   Triny is a 54 year old, presenting for the following:  Physical          7/16/2024     8:28 AM   Additional Questions   Roomed by silvana   Accompanied by alone         7/16/2024     8:28 AM   Patient Reported Additional Medications   Patient reports taking the following new medications none        Health Care Directive  Patient does not have a Health Care Directive or Living Will: Discussed advance care planning with patient; information given to patient to review.    HPI    Menopause   - Had ablation   - Weight gain, mood, sleep, irritable, achy joints, vaginal dryness       Lost 40 lbs       Gym 6 days a week and dieting - good eating habits      Then comes right back      More recently 20 lbs   - Feels heaviest ever been   - Wants to lose 80 lbs, frustrated   - Sleeps 7-8 hours but no energy  - Was having night sweats but these have improved, no hot flashes    - Hair loss   - Taking a lot of menopause supplements   - Tried everything, does for 6-7 months and then when doesn't see change will quit     Too much protein makes her sick to her stomach             7/16/2024   General Health   How would you rate your overall physical health? Good   Feel stress (tense, anxious, or unable to sleep) Not at all            7/16/2024   Nutrition   Three or more servings of calcium each day? (!) NO   Diet: Regular (no restrictions)   How many  servings of fruit and vegetables per day? (!) 0-1   How many sweetened beverages each day? 0-1            2024   Exercise   Days per week of moderate/strenous exercise 3 days            2024   Social Factors   Frequency of gathering with friends or relatives Once a week   Worry food won't last until get money to buy more No   Food not last or not have enough money for food? No   Do you have housing? (Housing is defined as stable permanent housing and does not include staying ouside in a car, in a tent, in an abandoned building, in an overnight shelter, or couch-surfing.) Yes   Are you worried about losing your housing? No   Lack of transportation? No   Unable to get utilities (heat,electricity)? No            2024   Fall Risk   Fallen 2 or more times in the past year? No   Trouble with walking or balance? No             2024   Dental   Dentist two times every year? (!) NO            2024   TB Screening   Were you born outside of the [place]? No          Today's PHQ-9 Score:       2024     8:20 AM   PHQ-9 SCORE   PHQ-9 Total Score MyChart 4 (Minimal depression)   PHQ-9 Total Score 4         2024   Substance Use   Alcohol more than 3/day or more than 7/wk Not Applicable   Do you use any other substances recreationally? No        Social History     Tobacco Use    Smoking status: Former     Current packs/day: 0.00     Types: Cigarettes     Quit date: 2003     Years since quittin.0    Smokeless tobacco: Never   Vaping Use    Vaping status: Never Used   Substance Use Topics    Alcohol use: Not Currently     Comment: Very Rare    Drug use: No         2023   LAST FHS-7 RESULTS   1st degree relative breast or ovarian cancer Yes   Any relative bilateral breast cancer No   Any male have breast cancer No   Any ONE woman have BOTH breast AND ovarian cancer No   Any woman with breast cancer before 50yrs No   2 or more relatives with breast AND/OR ovarian cancer No   2 or more relatives  with breast AND/OR bowel cancer No        Mammogram Screening - Mammogram every 1-2 years updated in Health Maintenance based on mutual decision making          7/16/2024   One time HIV Screening   Previous HIV test? I don't know          7/16/2024   STI Screening   New sexual partner(s) since last STI/HIV test? No        History of abnormal Pap smear: No - age 30-64 HPV with reflex Pap every 5 years recommended        Latest Ref Rng & Units 7/11/2019     4:10 PM 7/11/2019     4:05 PM 10/4/2012     1:53 PM   PAP / HPV   PAP (Historical)  NIL   NIL    HPV 16 DNA NEG^Negative  Negative     HPV 18 DNA NEG^Negative  Negative     Other HR HPV NEG^Negative  Negative       ASCVD Risk   The 10-year ASCVD risk score (Tiki KNOWLES, et al., 2019) is: 1.2%    Values used to calculate the score:      Age: 54 years      Sex: Female      Is Non- : No      Diabetic: No      Tobacco smoker: No      Systolic Blood Pressure: 110 mmHg      Is BP treated: No      HDL Cholesterol: 57 mg/dL      Total Cholesterol: 202 mg/dL      Reviewed and updated as needed this visit by Provider   Tobacco  Allergies  Meds  Problems  Med Hx  Surg Hx  Fam Hx            Past Medical History:   Diagnosis Date    Displacement of lumbar intervertebral disc without myelopathy 01/20/10    [facility]     Hyperlipidemia LDL goal <130 3/22/2018     Past Surgical History:   Procedure Laterality Date    BACK SURGERY  07/2000    DILATE CERVIX, HYSTEROSCOPY, ABLATE ENDOMETRIUM NOVASURE, COMBINED N/A 07/31/2019    Procedure: Dilateand curretage cervix, hysteroscopy, ablate endometrium novasure, combined;  Surgeon: Viral Hodges MD;  Location:  OR    ESOPHAGOSCOPY, GASTROSCOPY, DUODENOSCOPY (EGD), COMBINED N/A 01/24/2023    Procedure: ESOPHAGOGASTRODUODENOSCOPY, WITH BIOPSY;  Surgeon: Adryan Gaitan MD;  Location:  GI    GENITOURINARY SURGERY  04/2018    HEMILAMINECTOMY, DISCECTOMY LUMBAR TWO LEVELS, COMBINED   "08/22/2012    Procedure: COMBINED HEMILAMINECTOMY, DISCECTOMY LUMBAR TWO LEVELS;  Discectomy right Lumbar 4-Sacral 1, Hemilaminectomy bilateral Lumbar 4-Sacral 1;  Surgeon: Shree Martinez MD;  Location: [facility] OR    LAMINECT/DISCECTOMY, LUMBAR  01/20/2010    L4-5    LAPAROSCOPIC CHOLECYSTECTOMY N/A 01/06/2023    Procedure: CHOLECYSTECTOMY, LAPAROSCOPIC;  Surgeon: Adryan Gaitan MD;  Location: [facility] OR    OPERATIVE HYSTEROSCOPY WITH MORCELLATOR N/A 07/31/2019    Procedure: Diagnostic hysteroscopy, myosure myomectomy;  Surgeon: Viral Hodges MD;  Location: [facility] OR    ORTHOPEDIC SURGERY      Rt Knee    TUBAL LIGATION  1995     Lab work is in process  Labs reviewed in EPIC  BP Readings from Last 3 Encounters:   07/16/24 110/72   03/14/23 122/68   01/24/23 118/82    Wt Readings from Last 3 Encounters:   07/16/24 116.1 kg (256 lb)   03/14/23 113.6 kg (250 lb 6 oz)   01/17/23 112.5 kg (248 lb)              Review of Systems  Constitutional, neuro, ENT, endocrine, pulmonary, cardiac, gastrointestinal, genitourinary, musculoskeletal, integument and psychiatric systems are negative, except as otherwise noted.     Objective    Exam  /72   Pulse 73   Temp 97.2  F (36.2  C) (Temporal)   Resp 16   Ht 1.753 m (5' 9\")   Wt 116.1 kg (256 lb)   LMP  (LMP Unknown)   SpO2 98%   BMI 37.80 kg/m     Estimated body mass index is 37.8 kg/m  as calculated from the following:    Height as of this encounter: 1.753 m (5' 9\").    Weight as of this encounter: 116.1 kg (256 lb).    Physical Exam  Constitutional:       General: She is not in acute distress.     Appearance: She is well-developed.   HENT:      Right Ear: External ear normal. No swelling or tenderness. No middle ear effusion. There is no impacted cerumen. Tympanic membrane is not injected, erythematous or bulging.      Left Ear: External ear normal. No swelling or tenderness.  No middle ear effusion. There is no impacted cerumen. Tympanic membrane is not " injected, erythematous or bulging.      Nose: Nose normal. No nasal tenderness, mucosal edema, congestion or rhinorrhea.      Right Turbinates: Not enlarged or swollen.      Left Turbinates: Not enlarged or swollen.      Mouth/Throat:      Lips: Pink. No lesions.      Mouth: Mucous membranes are moist. No oral lesions.      Dentition: Normal dentition.      Tongue: No lesions. Tongue does not deviate from midline.      Pharynx: Oropharynx is clear. No pharyngeal swelling, oropharyngeal exudate or posterior oropharyngeal erythema.      Tonsils: No tonsillar exudate.   Eyes:      General: Lids are normal.         Right eye: No discharge.         Left eye: No discharge.      Conjunctiva/sclera: Conjunctivae normal.      Pupils: Pupils are equal, round, and reactive to light.   Neck:      Thyroid: No thyroid mass or thyromegaly.      Vascular: No JVD.      Trachea: No tracheal deviation.   Cardiovascular:      Rate and Rhythm: Normal rate and regular rhythm.      Pulses: Normal pulses.      Heart sounds: Normal heart sounds. No murmur heard.     No friction rub. No gallop.   Pulmonary:      Effort: Pulmonary effort is normal. No respiratory distress.      Breath sounds: Normal breath sounds. No stridor. No wheezing or rales.   Chest:   Breasts:     Breasts are symmetrical.      Right: No inverted nipple, mass, nipple discharge or skin change.      Left: No inverted nipple, mass, nipple discharge or skin change.   Abdominal:      General: Bowel sounds are normal. There is no distension.      Palpations: Abdomen is soft. There is no hepatomegaly, splenomegaly or mass.      Tenderness: There is no abdominal tenderness. There is no guarding or rebound.      Hernia: No hernia is present.   Genitourinary:     General: Normal vulva.      Vagina: Normal. No vaginal discharge, erythema or tenderness.      Cervix: No cervical motion tenderness, discharge or lesion.      Uterus: Normal. Not enlarged and not tender.       Adnexa:  Right adnexa normal and left adnexa normal.        Right: No mass, tenderness or fullness.          Left: No mass, tenderness or fullness.     Musculoskeletal:         General: Normal range of motion.      Cervical back: Normal range of motion and neck supple.      Right lower leg: No edema.      Left lower leg: No edema.   Lymphadenopathy:      Cervical: No cervical adenopathy.      Upper Body:      Right upper body: No supraclavicular or axillary adenopathy.      Left upper body: No supraclavicular or axillary adenopathy.   Skin:     General: Skin is warm and dry.   Neurological:      Mental Status: She is alert and oriented to person, place, and time.   Psychiatric:         Behavior: Behavior normal.         Thought Content: Thought content normal.         Judgment: Judgment normal.           Diagnostics: reviewed in Epic, see orders pending in Epic       Signed Electronically by: Nena Schmidt PA-C

## 2024-07-17 LAB
ANA SER QL IF: NEGATIVE
HPV HR 12 DNA CVX QL NAA+PROBE: NEGATIVE
HPV16 DNA CVX QL NAA+PROBE: NEGATIVE
HPV18 DNA CVX QL NAA+PROBE: NEGATIVE
HUMAN PAPILLOMA VIRUS FINAL DIAGNOSIS: NORMAL

## 2024-07-17 NOTE — RESULT ENCOUNTER NOTE
Hello Triny    Your results were normal. FSH shows still pre-menopausal. Mild elevation in cholesterol but about the same as 3 years ago.     The results are attached for your review.       Dashawn Schmidt PA-C

## 2024-07-22 LAB
BKR LAB AP GYN ADEQUACY: NORMAL
BKR LAB AP GYN INTERPRETATION: NORMAL
BKR LAB AP PREVIOUS ABNORMAL: NORMAL
PATH REPORT.COMMENTS IMP SPEC: NORMAL
PATH REPORT.COMMENTS IMP SPEC: NORMAL
PATH REPORT.RELEVANT HX SPEC: NORMAL

## 2024-07-30 LAB — NONINV COLON CA DNA+OCC BLD SCRN STL QL: NEGATIVE

## 2024-08-07 ASSESSMENT — PATIENT HEALTH QUESTIONNAIRE - PHQ9
10. IF YOU CHECKED OFF ANY PROBLEMS, HOW DIFFICULT HAVE THESE PROBLEMS MADE IT FOR YOU TO DO YOUR WORK, TAKE CARE OF THINGS AT HOME, OR GET ALONG WITH OTHER PEOPLE: SOMEWHAT DIFFICULT
SUM OF ALL RESPONSES TO PHQ QUESTIONS 1-9: 5
SUM OF ALL RESPONSES TO PHQ QUESTIONS 1-9: 5

## 2024-08-08 ENCOUNTER — VIRTUAL VISIT (OUTPATIENT)
Dept: FAMILY MEDICINE | Facility: OTHER | Age: 54
End: 2024-08-08
Payer: COMMERCIAL

## 2024-08-08 DIAGNOSIS — E66.01 MORBID OBESITY (H): ICD-10-CM

## 2024-08-08 PROCEDURE — 99213 OFFICE O/P EST LOW 20 MIN: CPT | Mod: 95 | Performed by: PHYSICIAN ASSISTANT

## 2024-08-08 RX ORDER — TOPIRAMATE 50 MG/1
50 TABLET, FILM COATED ORAL AT BEDTIME
Qty: 30 TABLET | Refills: 1 | Status: SHIPPED | OUTPATIENT
Start: 2024-08-08

## 2024-08-08 NOTE — PROGRESS NOTES
Triny is a 54 year old who is being evaluated via a billable video visit.    How would you like to obtain your AVS? MyChart  If the video visit is dropped, the invitation should be resent by: Text to cell phone: [phone]  Will anyone else be joining your video visit? No    Assessment & Plan     ICD-10-CM    1. Morbid obesity (H)  E66.01 topiramate (TOPAMAX) 50 MG tablet          - Patient reports starting Topamax at 25 mg BID went well, but when went up to 50 mg BID had really bad side effects  - Switched to taking just 50 mg once a day at night and is having those side effects going or almost gone   - Did lose about 6 lbs   - Due to the side effects, I am going to recommend continuing on 50 mg at bedtime for another month      Did discuss other option would be to switch to another medication      Patient would like to continue on the 50 mg   - Reviewed medication use and side effects, refilled   - Recheck 1 month       Review of the result(s) of each unique test - None     Diagnosis or treatment significantly limited by social determinants of health - None     18 minutes spent on the date of the encounter doing chart review, history and exam, documentation and further activities as noted above    The patient indicates understanding of these issues and agrees with the plan.    Follow up: 1 month     Dashawn Amaro-KAREY Portillo  [facility] [place]   Triny is a 54 year old, presenting for the following health issues:  Weight Loss        8/8/2024    11:20 AM   Additional Questions   Roomed by carol   Accompanied by self     History of Present Illness       Reason for visit:  Prescription follow up    She eats 0-1 servings of fruits and vegetables daily.She consumes 0 sweetened beverage(s) daily.She exercises with enough effort to increase her heart rate 10 to 19 minutes per day.  She exercises with enough effort to increase her heart rate 3 or less days per week.   She is taking  "medications regularly.       Medication Followup of topamax  Taking Medication as prescribed: yes - 2 in the am and 2 in the pm  Side Effects:  zero energy and appetite, headache, irritable, tingling in elbows to hands and knees to toes. Patient says after she went back down to just twice a day it was better  Medication Helping Symptoms:  yes    - First 2 weeks weren't bad but when increased to 2 and 2 had side effects   - Very agitated and irritated   - 4 days went back to 2 tablets at night      Still has tingling but energy is coming back, irritation is gone   - First two weeks lost 5 lbs      Then hadn't lost any but this AM had lost another lb   - Feels like bloating is way better, arthritis joint pain is better, sugar cravings are way down         Review of Systems  Constitutional, neuro, ENT, endocrine, pulmonary, cardiac, gastrointestinal, genitourinary, musculoskeletal, integument and psychiatric systems are negative, except as otherwise noted.      Objective    Vitals - Patient Reported  Weight (Patient Reported): 112.9 kg (249 lb)  Height (Patient Reported): 175.3 cm (5' 9\")  BMI (Based on Pt Reported Ht/Wt): 36.77  Vitals:  No vitals were obtained today due to virtual visit.    Physical Exam   GENERAL: alert and no distress  EYES: Eyes grossly normal to inspection.  No discharge or erythema, or obvious scleral/conjunctival abnormalities.  RESP: No audible wheeze, cough, or visible cyanosis.    SKIN: Visible skin clear. No significant rash, abnormal pigmentation or lesions.  NEURO: Cranial nerves grossly intact.  Mentation and speech appropriate for age.  PSYCH: Appropriate affect but tearful, tone, and pace of words    Diagnostics: none           Video-Visit Details    Type of service:  Video Visit   Originating Location (pt. Location): Home  Distant Location (provider location):  On-site  Platform used for Video Visit: [place]  Signed Electronically by: Nena Schmidt PA-C    "

## 2024-09-05 ENCOUNTER — VIRTUAL VISIT (OUTPATIENT)
Dept: FAMILY MEDICINE | Facility: OTHER | Age: 54
End: 2024-09-05
Payer: COMMERCIAL

## 2024-09-05 ENCOUNTER — TELEPHONE (OUTPATIENT)
Dept: FAMILY MEDICINE | Facility: OTHER | Age: 54
End: 2024-09-05

## 2024-09-05 DIAGNOSIS — E66.01 MORBID OBESITY (H): ICD-10-CM

## 2024-09-05 PROCEDURE — 99212 OFFICE O/P EST SF 10 MIN: CPT | Mod: 95 | Performed by: PHYSICIAN ASSISTANT

## 2024-09-05 RX ORDER — PHENTERMINE HYDROCHLORIDE 15 MG/1
15 CAPSULE ORAL EVERY MORNING
Qty: 30 CAPSULE | Refills: 0 | Status: SHIPPED | OUTPATIENT
Start: 2024-09-05

## 2024-09-05 NOTE — TELEPHONE ENCOUNTER
phentermine 15 MG capsule     Prior Authorization Retail Medication Request    Medication/Dose: phentermine 15 MG capsule  Diagnosis and ICD code (if different than what is on RX):    New/renewal/insurance change PA/secondary ins. PA:  Previously Tried and Failed:    Rationale:      Insurance   Primary:   Insurance ID:      Secondary (if applicable):  Insurance ID:      Pharmacy Information (if different than what is on RX)  Name:    Phone:    Fax:

## 2024-09-05 NOTE — PROGRESS NOTES
Triny is a 54 year old who is being evaluated via a billable video visit.    How would you like to obtain your AVS? MyChart  If the video visit is dropped, the invitation should be resent by: Text to cell phone: [phone]  Will anyone else be joining your video visit? No    Assessment & Plan     ICD-10-CM    1. Morbid obesity (H)  E66.01 phentermine 15 MG capsule          - Patient here to recheck on weight loss      Started on Topiramate and had 5-6 lbs weight loss but a lot of side effects      Currently taking 75 mg at night   - Due to the side effects, recommend taper off, see patient instructions   - Recommend trial of Phentermine starting at 15 mg      Discussed use and side effects of this medication including can only do high dose for a max of 12 weeks   - Recheck 4-6 weeks   - Encouraged efforts in diet and exercise       Review of the result(s) of each unique test - None     Diagnosis or treatment significantly limited by social determinants of health - None     15 minutes spent on the date of the encounter doing chart review, history and exam, documentation and further activities as noted above    The patient indicates understanding of these issues and agrees with the plan.    Dashawn Schmidt PA-C  [facility] [place]   Triny is a 54 year old, presenting for the following health issues:  Weight Loss      9/5/2024     9:18 AM   Additional Questions   Roomed by carol   Accompanied by self     History of Present Illness       Reason for visit:  Medication follow up    She eats 0-1 servings of fruits and vegetables daily.She consumes 0 sweetened beverage(s) daily.She exercises with enough effort to increase her heart rate 9 or less minutes per day.  She exercises with enough effort to increase her heart rate 4 days per week.   She is taking medications regularly.       Medication Followup of Topamax   Taking Medication as prescribed: yes  Side Effects:   "headaches, numbness in hands and feet  Medication Helping Symptoms:  yes    - Lost 5-6 lbs and stayed off but hasn't lost any more  - Most side effects not better   - Trying to exercise more,  on board      Eating healthy   - Did try 1.5 tablets and didn't do anything, about 10 days now         Review of Systems  Constitutional, neuro, ENT, endocrine, pulmonary, cardiac, gastrointestinal, genitourinary, musculoskeletal, integument and psychiatric systems are negative, except as otherwise noted.      Objective    Vitals - Patient Reported  Weight (Patient Reported): 113.4 kg (250 lb)  Height (Patient Reported): 175.3 cm (5' 9\")  BMI (Based on Pt Reported Ht/Wt): 36.92    Physical Exam   GENERAL: alert and no distress  EYES: Eyes grossly normal to inspection.  No discharge or erythema, or obvious scleral/conjunctival abnormalities.  RESP: No audible wheeze, cough, or visible cyanosis.    SKIN: Visible skin clear. No significant rash, abnormal pigmentation or lesions.  NEURO: Cranial nerves grossly intact.  Mentation and speech appropriate for age.  PSYCH: Appropriate affect, tone, and pace of words    Diagnostics; none       Video-Visit Details    Type of service:  Video Visit   Originating Location (pt. Location): Home  Distant Location (provider location):  Off-site  Platform used for Video Visit: [place]  Signed Electronically by: Nena Schmidt PA-C    "

## 2024-09-05 NOTE — PATIENT INSTRUCTIONS
- Decrease to 1 tablet (50 mg) for 3-4 days   - Decrease to 1/2 tablet (25 mg) for 3-4 days   - Then stop medication     - Start Phentermine 15 mg - once daily in AM     - Recheck ~4-6 weeks

## 2024-09-09 NOTE — TELEPHONE ENCOUNTER
PA Initiation    Medication: PHENTERMINE HCL 15 MG PO CAPS  Insurance Company: [employer] - Phone [phone] Fax [phone]  Pharm[facility] Filling the Rx[id number] [place] [facility] [address] [place] [address]  Filling Pharmacy Phone: [phone]  Filling Pharmacy Fax: [phone]  Start Date: 9/9/2024

## 2024-09-09 NOTE — TELEPHONE ENCOUNTER
PRIOR AUTHORIZATION DENIED    Medication: PHENTERMINE HCL 15 MG PO CAPS    Insurance Company: [employer] - Phone [phone] Fax [phone]    Denial Date: 9/9/2024    Denial Reason(s): Excluded    Appeal Information:

## 2024-10-01 ENCOUNTER — MYC MEDICAL ADVICE (OUTPATIENT)
Dept: FAMILY MEDICINE | Facility: OTHER | Age: 54
End: 2024-10-01

## 2024-10-01 ENCOUNTER — HOSPITAL ENCOUNTER (EMERGENCY)
Facility: CLINIC | Age: 54
Discharge: HOME | End: 2024-10-01
Attending: STUDENT IN AN ORGANIZED HEALTH CARE EDUCATION/TRAINING PROGRAM | Admitting: STUDENT IN AN ORGANIZED HEALTH CARE EDUCATION/TRAINING PROGRAM
Payer: COMMERCIAL

## 2024-10-01 VITALS
SYSTOLIC BLOOD PRESSURE: 149 MMHG | DIASTOLIC BLOOD PRESSURE: 103 MMHG | HEART RATE: 88 BPM | WEIGHT: 251 LBS | TEMPERATURE: 98.2 F | RESPIRATION RATE: 20 BRPM | BODY MASS INDEX: 37.07 KG/M2 | OXYGEN SATURATION: 100 %

## 2024-10-01 DIAGNOSIS — R31.9 HEMATURIA, UNSPECIFIED TYPE: ICD-10-CM

## 2024-10-01 DIAGNOSIS — N39.0 ACUTE LOWER UTI: ICD-10-CM

## 2024-10-01 LAB
ALBUMIN UR-MCNC: 100 MG/DL
APPEARANCE UR: ABNORMAL
BACTERIA #/AREA URNS HPF: ABNORMAL /HPF
BILIRUB UR QL STRIP: NEGATIVE
COLOR UR AUTO: YELLOW
GLUCOSE UR STRIP-MCNC: NEGATIVE MG/DL
HGB UR QL STRIP: ABNORMAL
KETONES UR STRIP-MCNC: NEGATIVE MG/DL
LEUKOCYTE ESTERASE UR QL STRIP: ABNORMAL
MUCOUS THREADS #/AREA URNS LPF: PRESENT /LPF
NITRATE UR QL: NEGATIVE
PH UR STRIP: 6 [PH] (ref 5–7)
RBC URINE: >182 /HPF
SP GR UR STRIP: 1.02 (ref 1–1.03)
SQUAMOUS EPITHELIAL: 5 /HPF
TRANSITIONAL EPI: 3 /HPF
UROBILINOGEN UR STRIP-MCNC: NORMAL MG/DL
WBC CLUMPS #/AREA URNS HPF: PRESENT /HPF
WBC URINE: >182 /HPF

## 2024-10-01 PROCEDURE — 99283 EMERGENCY DEPT VISIT LOW MDM: CPT | Performed by: STUDENT IN AN ORGANIZED HEALTH CARE EDUCATION/TRAINING PROGRAM

## 2024-10-01 PROCEDURE — 87186 SC STD MICRODIL/AGAR DIL: CPT | Performed by: STUDENT IN AN ORGANIZED HEALTH CARE EDUCATION/TRAINING PROGRAM

## 2024-10-01 PROCEDURE — 81001 URINALYSIS AUTO W/SCOPE: CPT | Performed by: STUDENT IN AN ORGANIZED HEALTH CARE EDUCATION/TRAINING PROGRAM

## 2024-10-01 RX ORDER — CEPHALEXIN 500 MG/1
500 CAPSULE ORAL 2 TIMES DAILY
Qty: 14 CAPSULE | Refills: 0 | Status: SHIPPED | OUTPATIENT
Start: 2024-10-01 | End: 2024-10-08

## 2024-10-01 ASSESSMENT — COLUMBIA-SUICIDE SEVERITY RATING SCALE - C-SSRS
1. IN THE PAST MONTH, HAVE YOU WISHED YOU WERE DEAD OR WISHED YOU COULD GO TO SLEEP AND NOT WAKE UP?: NO
2. HAVE YOU ACTUALLY HAD ANY THOUGHTS OF KILLING YOURSELF IN THE PAST MONTH?: NO
6. HAVE YOU EVER DONE ANYTHING, STARTED TO DO ANYTHING, OR PREPARED TO DO ANYTHING TO END YOUR LIFE?: NO

## 2024-10-01 NOTE — Clinical Note
Triny Bower was seen and treated in our emergency department on 10/1/2024.  She may return to work on 10/03/2024.       If you have any questions or concerns, please don't hesitate to call.      Jayjay Peacock MD

## 2024-10-02 NOTE — ED TRIAGE NOTES
Pt presents with UTI symptoms. Burning and frequency . Abdomina pain. Started today however pt felt tired yesterday.

## 2024-10-02 NOTE — DISCHARGE INSTRUCTIONS
Your testing today is consistent with a significant urinary tract infection.  You also have a fair amount of blood in your urine, but I think that is from the infection itself.  A kidney stone or other abnormalities would be less likely without a fever or pain/tenderness.    Take the antibiotic as instructed until entirely gone.  Use Tylenol/ibuprofen for discomfort.  You can also try over-the-counter therapies such as Azo for other urinary symptoms.    Follow-up with your primary doctor for recheck.  Return to the emergency department sooner for any new or worsening symptoms, particularly any fevers, onset of abdominal or flank pain, vomiting.

## 2024-10-02 NOTE — ED PROVIDER NOTES
History     Chief Complaint   Patient presents with    Urinary Frequency     HPI  Triny Bower is a 54 year old female with history of depression, obesity who presents for evaluation of dysuria.  Patient developed some intermittent dysuria and fatigue yesterday.  The symptoms worsened around 4 PM tonight and she also has significant urinary urgency.  Then over the last hour she has also developed some blood-tinged urine.  No history of UTIs or kidney stones.  She had some abdominal pain a few days ago but none since.  She otherwise feels well, denies any fevers, chills, nausea or vomiting, vaginal pain or discharge, other complaints today.    Allergies:  No Known Allergies    Problem List:    Patient Active Problem List    Diagnosis Date Noted    Atrophic vaginitis 07/16/2024     Priority: Medium    Morbid obesity (H) 07/08/2021     Priority: Medium    Tendinitis of left wrist 04/04/2018     Priority: Medium    Hyperlipidemia LDL goal <130 03/22/2018     Priority: Medium    Gastroesophageal reflux disease without esophagitis 06/29/2015     Priority: Medium    Constipation, unspecified constipation type 06/29/2015     Priority: Medium    Major depressive disorder, recurrent episode, mild (H) 10/04/2012     Priority: Medium    Displacement of lumbar intervertebral disc without myelopathy 08/21/2012     Priority: Medium    Herniated nucleus pulposus, L5-S1, right 06/06/2012     Priority: Medium     historically      Lumbar radiculopathy 12/17/2010     Priority: Medium      Past Medical History:    Past Medical History:   Diagnosis Date    Displacement of lumbar intervertebral disc without myelopathy 01/20/10    Hyperlipidemia LDL goal <130 3/22/2018     Past Surgical History:    Past Surgical History:   Procedure Laterality Date    BACK SURGERY  07/2000    DILATE CERVIX, HYSTEROSCOPY, ABLATE ENDOMETRIUM NOVASURE, COMBINED N/A 07/31/2019    Procedure: Dilateand curretage cervix, hysteroscopy, ablate endometrium  novasure, combined;  Surgeon: Viral Hodges MD;  Location: [facility]    ESOPHAGOSCOPY, GASTROSCOPY, DUODENOSCOPY (EGD), COMBINED N/A 2023    Procedure: ESOPHAGOGASTRODUODENOSCOPY, WITH BIOPSY;  Surgeon: Adryan Gaitan MD;  Location:  GI    GENITOURINARY SURGERY  2018    HEMILAMINECTOMY, DISCECTOMY LUMBAR TWO LEVELS, COMBINED  2012    Procedure: COMBINED HEMILAMINECTOMY, DISCECTOMY LUMBAR TWO LEVELS;  Discectomy right Lumbar 4-Sacral 1, Hemilaminectomy bilateral Lumbar 4-Sacral 1;  Surgeon: Shree Martinez MD;  Location: [facility] OR    LAMINECT/DISCECTOMY, LUMBAR  2010    L4-5    LAPAROSCOPIC CHOLECYSTECTOMY N/A 2023    Procedure: CHOLECYSTECTOMY, LAPAROSCOPIC;  Surgeon: Adryan Gaitan MD;  Location: [facility]    OPERATIVE HYSTEROSCOPY WITH MORCELLATOR N/A 2019    Procedure: Diagnostic hysteroscopy, myosure myomectomy;  Surgeon: Viral Hodges MD;  Location: [facility] OR    ORTHOPEDIC SURGERY      Rt Knee    TUBAL LIGATION       Family History:    Family History   Problem Relation Age of Onset    Breast Cancer Mother         70's    Hypertension Mother     Cancer Maternal Grandmother         cervical cancer    Gynecology Maternal Grandmother         cervical cancer    Arthritis Paternal Grandmother     Osteoporosis Paternal Grandmother     Colon Cancer No family hx of      Social History:  Marital Status:   [2]  Social History     Tobacco Use    Smoking status: Former     Current packs/day: 0.00     Types: Cigarettes     Quit date: 2003     Years since quittin.2    Smokeless tobacco: Never   Vaping Use    Vaping status: Never Used   Substance Use Topics    Alcohol use: Not Currently     Comment: Very Rare    Drug use: No      Medications:    cephALEXin (KEFLEX) 500 MG capsule  omeprazole (PRILOSEC OTC) 20 MG EC tablet  phentermine 15 MG capsule  topiramate (TOPAMAX) 50 MG tablet      Review of Systems   All other systems reviewed and are negative.  See  HPI.    Physical Exam   BP: (!) 149/103  Pulse: 88  Temp: 98.2  F (36.8  C)  Resp: 20  Weight: 113.9 kg (251 lb)  SpO2: 100 %    Physical Exam  Vitals and nursing note reviewed.   Constitutional:       General: She is not in acute distress.     Appearance: Normal appearance. She is not diaphoretic.      Comments: Anxious, uncomfortable, stating she needs to void.  Otherwise nontoxic.   HENT:      Head: Normocephalic and atraumatic.      Mouth/Throat:      Mouth: Mucous membranes are moist.   Eyes:      General: No scleral icterus.     Conjunctiva/sclera: Conjunctivae normal.   Cardiovascular:      Rate and Rhythm: Normal rate and regular rhythm.      Pulses: Normal pulses.      Heart sounds: Normal heart sounds.   Pulmonary:      Effort: No respiratory distress.      Breath sounds: Normal breath sounds.   Abdominal:      General: Abdomen is flat.      Tenderness: There is no abdominal tenderness. There is no right CVA tenderness, left CVA tenderness, guarding or rebound.      Comments: No focal areas of tenderness.   Musculoskeletal:         General: Normal range of motion.      Cervical back: Normal range of motion and neck supple.   Skin:     General: Skin is warm.      Capillary Refill: Capillary refill takes less than 2 seconds.      Coloration: Skin is not pale.      Findings: No rash.   Neurological:      General: No focal deficit present.      Mental Status: She is alert and oriented to person, place, and time.   Psychiatric:         Mood and Affect: Mood normal.         ED Course        Procedures            Results for orders placed or performed during the hospital encounter of 10/01/24 (from the past 24 hour(s))   UA with Microscopic reflex to Culture    Specimen: Urine, Clean Catch   Result Value Ref Range    Color Urine Yellow Colorless, Straw, Light Yellow, Yellow    Appearance Urine Cloudy (A) Clear    Glucose Urine Negative Negative mg/dL    Bilirubin Urine Negative Negative    Ketones Urine Negative  Negative mg/dL    Specific Gravity Urine 1.016 1.003 - 1.035    Blood Urine Large (A) Negative    pH Urine 6.0 5.0 - 7.0    Protein Albumin Urine 100 (A) Negative mg/dL    Urobilinogen Urine Normal Normal, 2.0 mg/dL    Nitrite Urine Negative Negative    Leukocyte Esterase Urine Large (A) Negative    Bacteria Urine Few (A) None Seen /HPF    WBC Clumps Urine Present (A) None Seen /HPF    Mucus Urine Present (A) None Seen /LPF    RBC Urine >182 (H) <=2 /HPF    WBC Urine >182 (H) <=5 /HPF    Squamous Epithelials Urine 5 (H) <=1 /HPF    Transitional Epithelials Urine 3 (H) <=1 /HPF    Narrative    Urine Culture ordered based on laboratory criteria       Medications - No data to display    Assessments & Plan (with Medical Decision Making)     I have reviewed the nursing notes.    I have reviewed the findings, diagnosis, plan and need for follow up with the patient.  Medical Decision Making  Triny Bower is a 54 year old female with history of depression, obesity who presents for evaluation of dysuria.  Hypertensive on arrival, 149/103, though patient is anxious, stating that she needs to use the restroom/urinate.  Otherwise nontoxic.  Abdomen is entirely nontender, no rebound or guarding, no CVA tenderness.  Her symptoms seem most consistent with a urinary tract infection, given dysuria and frequency/urgency.  With no tenderness today, I doubt more sinister causes such as appendicitis or ovarian pathology.  Urinalysis showed greater than 182 WBCs and RBCs, positive leukocyte esterase, large amounts of blood.  I still think these findings are due to the infection directly.  Less likely to represent a stone based on lack of pain or tenderness.  Will start the patient on Keflex outpatient for now, but also discussed PCP follow-up and return precautions to include any onset of fever, abdominal or flank discomfort, vomiting, other new or worsening symptoms.    New Prescriptions    CEPHALEXIN (KEFLEX) 500 MG CAPSULE    Take  1 capsule (500 mg) by mouth 2 times daily for 7 days.     Final diagnoses:   Acute lower UTI   Hematuria, unspecified type     10/1/2024   [facility] EMERGENCY DEPT       Jayjay Peacock MD  10/01/24 1950

## 2024-10-02 NOTE — ED NOTES
Patient states she is concerned about yeast infection after antibiotics, advised we can speak with provider about adding an anti-yeast medicine but patient declined at this time. States she will reach out to PCP if she has any issues.

## 2024-10-03 LAB — BACTERIA UR CULT: ABNORMAL

## 2024-10-04 ENCOUNTER — TELEPHONE (OUTPATIENT)
Dept: NURSING | Facility: CLINIC | Age: 54
End: 2024-10-04
Payer: COMMERCIAL

## 2024-10-04 NOTE — TELEPHONE ENCOUNTER
[facility]    Reason for call: Lab Result Notification     Lab Result (including Rx patient on, if applicable).  If culture, copy of lab report at bottom.  Lab Result: Urine Culture - See Below    ED Rx: cephALEXin (KEFLEX) 500 MG capsule - Take 1 capsule (500 mg) by mouth 2 times daily for 7 days. (SUSCEPTIBLE)    Creatinine Level (mg/dl)   Creatinine   Date Value Ref Range Status   07/16/2024 0.93 0.51 - 0.95 mg/dL Final   07/12/2019 0.92 0.52 - 1.04 mg/dL Final    Creatinine clearance (ml/min), if applicable    Serum creatinine: 0.93 mg/dL 07/16/24 0942  Estimated creatinine clearance: 94.2 mL/min     ED Symptoms: Presented to the ED with dysuria, hematuria, and fatigue.     Current Symptoms: Had a fever yesterday but gone today. Still having dysuria but states it is improved. Denies any new or worsening symptoms.     RN Recommendations/Instructions per [facility] ED lab result protocol:   [facility] ED lab result protocol utilized: Urine Culture    Patient/care giver notified to contact your PCP clinic or return to the Emergency department if your:  Symptoms do not improve after 3 days on antibiotic.  Symptoms do not resolve after completing antibiotic.  Symptoms worsen or other concerning symptoms.       LUCA SAWYER RN

## 2024-10-08 ENCOUNTER — VIRTUAL VISIT (OUTPATIENT)
Dept: FAMILY MEDICINE | Facility: OTHER | Age: 54
End: 2024-10-08
Payer: COMMERCIAL

## 2024-10-08 DIAGNOSIS — E66.01 MORBID OBESITY (H): Primary | ICD-10-CM

## 2024-10-08 DIAGNOSIS — N30.01 ACUTE CYSTITIS WITH HEMATURIA: ICD-10-CM

## 2024-10-08 PROCEDURE — 99214 OFFICE O/P EST MOD 30 MIN: CPT | Mod: 95 | Performed by: PHYSICIAN ASSISTANT

## 2024-10-08 RX ORDER — PHENTERMINE HYDROCHLORIDE 30 MG/1
30 CAPSULE ORAL EVERY MORNING
Qty: 30 CAPSULE | Refills: 0 | Status: SHIPPED | OUTPATIENT
Start: 2024-10-08 | End: 2024-11-06 | Stop reason: DRUGHIGH

## 2024-10-08 RX ORDER — CEPHALEXIN 500 MG/1
500 CAPSULE ORAL 2 TIMES DAILY
Qty: 14 CAPSULE | Refills: 0 | Status: SHIPPED | OUTPATIENT
Start: 2024-10-08 | End: 2024-11-06

## 2024-10-08 NOTE — PROGRESS NOTES
Triny is a 54 year old who is being evaluated via a billable video visit.    How would you like to obtain your AVS? MyChart  If the video visit is dropped, the invitation should be resent by: Text to cell phone: [phone]  Will anyone else be joining your video visit? No    Assessment & Plan     ICD-10-CM    1. Morbid obesity (H)  E66.01 phentermine 30 MG capsule      2. Acute cystitis with hematuria  N30.01 cephALEXin (KEFLEX) 500 MG capsule        Weight loss  - Patient here to recheck on weight loss, failed on Topamax due to side effects      Started on Topiramate and had 5-6 lbs weight loss but a lot of side effects   - Trial of Phentermine starting at 15 mg went ok     Lost about 5 lbs but then didn't lose after that      Tolerated well   - Recommend increase to 30 mg     Reviewed use and side effects   - Recheck 4-6 weeks   - Encouraged efforts in diet and exercise     2. UTI   - Reviewed ED note and labs   - Was put on Keflex, UC reviewed and is appropriate   - Patient states still some pain, often needs longer courses of antibiotics  - Recommend giving 24 hours, if still not better then can repeat Keflex course, this was sent for her  - Discussed antibiotic use, duration, and side effects  - If symptoms persist, will need repeat UA/UC      Review of the result(s) of each unique test - See list           10/1/24 - UA/UC   Diagnosis or treatment significantly limited by social determinants of health - None     15 minutes spent on the date of the encounter doing chart review, history and exam, documentation and further activities as noted above    The patient indicates understanding of these issues and agrees with the plan.    Follow up: 1 month     Dashawn Schmidt PA-C  [facility]   Triny is a 54 year old, presenting for the following health issues:  Weight Loss      10/8/2024     9:17 AM   Additional Questions   Roomed by silvana   Accompanied by cathryn          10/8/2024     9:17 AM   Patient Reported Additional Medications   Patient reports taking the following new medications none     History of Present Illness       Reason for visit:  Rx follow up, ER  Visit follow up - UTI    She eats 0-1 servings of fruits and vegetables daily.She consumes 0 sweetened beverage(s) daily.She exercises with enough effort to increase her heart rate 9 or less minutes per day.  She exercises with enough effort to increase her heart rate 3 or less days per week. She is missing 1 dose(s) of medications per week.  She is not taking prescribed medications regularly due to other.       ED/UC Followup:    Facility:  [facility]  Date of visit: 10/01/2024  Reason for visit: uti  Current Status: better but still present  - Urgency and urinary tract pain is gone   - But still some bladder irritation   - Took last antibiotic this morning   - Achiness is better   - Fatigue is better       Medication Followup of phentermine  Taking Medication as prescribed: yes  Side Effects:  little swelling but got better  Medication Helping Symptoms:  NO    - Missed 1 day   - First lost 4-5 lbs but then nothing   - Weight today - 245           Review of Systems  Constitutional, neuro, ENT, endocrine, pulmonary, cardiac, gastrointestinal, genitourinary, musculoskeletal, integument and psychiatric systems are negative, except as otherwise noted.      Objective    Vitals - Patient Reported  Pain Score: Moderate Pain (4)  Pain Loc: Other - see comment (bladder)  Physical Exam   GENERAL: alert and no distress  EYES: Eyes grossly normal to inspection.  No discharge or erythema, or obvious scleral/conjunctival abnormalities.  RESP: No audible wheeze, cough, or visible cyanosis.    SKIN: Visible skin clear. No significant rash, abnormal pigmentation or lesions.  NEURO: Cranial nerves grossly intact.  Mentation and speech appropriate for age.  PSYCH: Appropriate affect, tone, and pace of words    Diagnostics: reviewed  in Epic           Video-Visit Details    Type of service:  Video Visit   Originating Location (pt. Location): Home  Distant Location (provider location):  Off-site  Platform used for Video Visit: [place]  Signed Electronically by: Nena Schmidt PA-C

## 2024-11-06 ENCOUNTER — VIRTUAL VISIT (OUTPATIENT)
Dept: FAMILY MEDICINE | Facility: OTHER | Age: 54
End: 2024-11-06
Payer: COMMERCIAL

## 2024-11-06 DIAGNOSIS — E66.01 MORBID OBESITY (H): ICD-10-CM

## 2024-11-06 PROCEDURE — G2211 COMPLEX E/M VISIT ADD ON: HCPCS | Mod: 95 | Performed by: PHYSICIAN ASSISTANT

## 2024-11-06 PROCEDURE — 99213 OFFICE O/P EST LOW 20 MIN: CPT | Mod: 95 | Performed by: PHYSICIAN ASSISTANT

## 2024-11-06 RX ORDER — PHENTERMINE HYDROCHLORIDE 37.5 MG/1
37.5 TABLET ORAL
Qty: 30 TABLET | Refills: 1 | Status: SHIPPED | OUTPATIENT
Start: 2024-11-06

## 2024-11-06 RX ORDER — PHENTERMINE HYDROCHLORIDE 37.5 MG/1
37.5 TABLET ORAL
Qty: 30 TABLET | Refills: 1 | Status: SHIPPED | OUTPATIENT
Start: 2024-11-06 | End: 2024-11-06

## 2024-11-06 NOTE — PROGRESS NOTES
Triny is a 54 year old who is being evaluated via a billable video visit.    How would you like to obtain your AVS? MyChart  If the video visit is dropped, the invitation should be resent by: Text to cell phone: [phone]  Will anyone else be joining your video visit? No    Assessment & Plan     ICD-10-CM    1. Morbid obesity (H)  E66.01 phentermine (ADIPEX-P) 37.5 MG tablet        - Patient here to recheck on weight loss, failed on Topamax due to side effects      Started on Topiramate and had 5-6 lbs weight loss but a lot of side effects, was started on Phentermine 15 mg, about 5 lbs weight loss, was increased to 30 mg at last visit      Reports this has gone well with minimal side effects but only lost 10 lbs the first 2 weeks and then nothing   - Recommend increase to 37.5 mg     Reviewed use and side effects   - Recheck 4  - Encouraged efforts in diet and exercise       Review of the result(s) of each unique test - none    Diagnosis or treatment significantly limited by social determinants of health - None     12 minutes spent on the date of the encounter doing chart review, history and exam, documentation and further activities as noted above    The patient indicates understanding of these issues and agrees with the plan.    Follow up: 1 month    Dashawn Amaro-KAREY Portillo  [facility]   Triny is a 54 year old, presenting for the following health issues:  Weight Loss        11/6/2024    10:04 AM   Additional Questions   Roomed by cielo   Accompanied by self     History of Present Illness       Reason for visit:  RX renewal    She eats 0-1 servings of fruits and vegetables daily.She consumes 0 sweetened beverage(s) daily.She exercises with enough effort to increase her heart rate 9 or less minutes per day.  She exercises with enough effort to increase her heart rate 3 or less days per week.   She is taking medications regularly.     Medication Followup of  Phenermine  Taking Medication as prescribed: yes  Side Effects:  constipation   Medication Helping Symptoms:  staying at the same weight for the last 2 weeks, patient would like to go up in dose    - Been good   - The first 2 weeks were really good, lost 10 lbs   - Was down to 242 this am      But has been at this number for 10-14 days         Review of Systems  Constitutional, neuro, ENT, endocrine, pulmonary, cardiac, gastrointestinal, genitourinary, musculoskeletal, integument and psychiatric systems are negative, except as otherwise noted.      Objective    Vitals - Patient Reported  Weight (Patient Reported): 109.8 kg (242 lb)  Vitals:  No vitals were obtained today due to virtual visit.    Physical Exam   GENERAL: alert and no distress  EYES: Eyes grossly normal to inspection.  No discharge or erythema, or obvious scleral/conjunctival abnormalities.  RESP: No audible wheeze, cough, or visible cyanosis.    SKIN: Visible skin clear. No significant rash, abnormal pigmentation or lesions.  NEURO: Cranial nerves grossly intact.  Mentation and speech appropriate for age.  PSYCH: Appropriate affect, tone, and pace of words    Diagnostics; none         Video-Visit Details    Type of service:  Video Visit   Originating Location (pt. Location): Home  Distant Location (provider location):  Off-site  Platform used for Video Visit: [place]  Signed Electronically by: Nena Schmidt PA-C

## 2024-11-22 ENCOUNTER — NURSE TRIAGE (OUTPATIENT)
Dept: FAMILY MEDICINE | Facility: OTHER | Age: 54
End: 2024-11-22
Payer: COMMERCIAL

## 2024-11-25 NOTE — TELEPHONE ENCOUNTER
Nurse Triage SBAR    Is this a 2nd Level Triage? NO    Situation: Patient reports she has had some continuous dull pain to the R side and middle of her rib cage.     Background: Pain has been ongoing x 2 weeks, recent UTI requiring two rounds of antibiotics    Assessment: Patient reports pain is constant in the R side of her back ( kidney area) and middle of her rib cage. She states at times when she is up and moving, it increase to a very sharp pain and it will radiate to her R hip and front of her rib cage.  No recent injuries. No urinary symptoms or fevers. She is concerned it could be another UTI. Has tried ibuprofen and heat with no relief.    Discussed red flag symptoms with her and when to go to ED.     Protocol Recommended Disposition:   See in Office Today or Tomorrow    Recommendation: Patient scheduled tomorrow with same day provider, due to increased severe pain at times, do you think she needs to be seen sooner? She is currently in [place] and declined ED or UC at this time but could be seen late afternoon today.    Routed to provider    Does the patient meet one of the following criteria for ADS visit consideration? No    DANGELO BullockN, RN        Reason for Disposition   Age > 50 and no history of prior similar back pain    Additional Information   Negative: Passed out (e.g., fainted, lost consciousness, blacked out and was not responding)   Negative: Shock suspected (e.g., cold/pale/clammy skin, too weak to stand, low BP, rapid pulse)   Negative: Sounds like a life-threatening emergency to the triager   Negative: Major injury to the back (e.g., MVA, fall > 10 feet or 3 meters, penetrating injury, etc.)   Negative: Pain in the upper back over the ribs (rib cage) that radiates (travels) into the chest   Negative: Pain in the upper back over the ribs (rib cage) and worsened by coughing (or clearly increases with breathing)   Negative: Back pain during pregnancy   Negative: SEVERE back pain of  sudden onset and age > 60 years   Negative: SEVERE abdominal pain (e.g., excruciating)   Negative: Abdominal pain and age > 60 years   Negative: Unable to urinate (or only a few drops) and bladder feels very full   Negative: Loss of bladder or bowel control (urine or bowel incontinence; wetting self, leaking stool) of new-onset   Negative: Numbness (loss of sensation) in groin or rectal area   Negative: Blood in urine (red, pink, or tea-colored)   Negative: Weakness of a leg or foot (e.g., unable to bear weight, dragging foot)   Negative: Pain radiates into groin, scrotum   Negative: Vomiting and pain over lower ribs of back (i.e., flank - kidney area)   Negative: Patient sounds very sick or weak to the triager   Negative: Fever > 100.4 F (38.0 C) and flank pain   Negative: Pain or burning with passing urine (urination)   Negative: SEVERE back pain (e.g., excruciating, unable to do any normal activities) and not improved after pain medicine and CARE ADVICE   Negative: Numbness in an arm or hand (i.e., loss of sensation) and upper back pain   Negative: Numbness in a leg or foot (i.e., loss of sensation)   Negative: High-risk adult (e.g., history of cancer, history of HIV, or history of IV Drug Use)   Negative: Soft tissue infection (e.g., abscess, cellulitis) or other serious infection (e.g., bacteremia) in last 2 weeks   Negative: Painful rash with multiple small blisters grouped together (i.e., dermatomal distribution or 'band' or 'stripe')   Negative: Pain radiates into the thigh or further down the leg, and in both legs    Protocols used: Back Pain-A-[place]

## 2024-11-25 NOTE — CONFIDENTIAL NOTE
OK to see same day provider tomorrow as long as educated on red flag symptoms and when to see emergency care.    Dashawn Amaro-KAREY Portillo  [facility]

## 2024-11-25 NOTE — TELEPHONE ENCOUNTER
Spoke with patient. Per provider, okay to be seen in clinic tomorrow for appointment as scheduled. RN reviewed red flag symptoms with patient and when to see emergency care. She agreed and understood.         Adrienne Dos Santos RN, BSN

## 2024-11-25 NOTE — TELEPHONE ENCOUNTER
Started speaking with patient regarding her mychart concerns but phone cut off.  I called her back and went to voicemail.  Left message on her voicemail to call back [phone] to speak with any RN on her care team regarding her mychart message.  RN needing to gather more information on her symptoms.  Herlinda SPENCER RN

## 2024-11-26 ENCOUNTER — OFFICE VISIT (OUTPATIENT)
Dept: FAMILY MEDICINE | Facility: OTHER | Age: 54
End: 2024-11-26
Payer: COMMERCIAL

## 2024-11-26 VITALS
BODY MASS INDEX: 36.43 KG/M2 | TEMPERATURE: 97.1 F | WEIGHT: 246 LBS | OXYGEN SATURATION: 98 % | DIASTOLIC BLOOD PRESSURE: 80 MMHG | SYSTOLIC BLOOD PRESSURE: 130 MMHG | RESPIRATION RATE: 18 BRPM | HEART RATE: 77 BPM | HEIGHT: 69 IN

## 2024-11-26 DIAGNOSIS — M54.6 ACUTE RIGHT-SIDED THORACIC BACK PAIN: Primary | ICD-10-CM

## 2024-11-26 DIAGNOSIS — R07.89 CHEST WALL PAIN: ICD-10-CM

## 2024-11-26 PROCEDURE — 99214 OFFICE O/P EST MOD 30 MIN: CPT

## 2024-11-26 RX ORDER — METHOCARBAMOL 500 MG/1
500 TABLET, FILM COATED ORAL 4 TIMES DAILY PRN
Qty: 40 TABLET | Refills: 0 | Status: SHIPPED | OUTPATIENT
Start: 2024-11-26

## 2024-11-26 RX ORDER — PREDNISONE 20 MG/1
40 TABLET ORAL DAILY
Qty: 10 TABLET | Refills: 0 | Status: SHIPPED | OUTPATIENT
Start: 2024-11-26 | End: 2024-12-01

## 2024-11-26 ASSESSMENT — PAIN SCALES - GENERAL: PAINLEVEL_OUTOF10: MILD PAIN (3)

## 2024-11-26 ASSESSMENT — ENCOUNTER SYMPTOMS: BACK PAIN: 1

## 2024-11-26 NOTE — PROGRESS NOTES
Assessment & Plan     Acute right-sided thoracic back pain (Primary)  Chest wall pain  Patient is a 54 year-old female with GERD and MDD presenting with concerns of ongoing right thoracic back pain that radiates into right lateral/mid-axillary chest wall. High suspicion for muscular process given reproducible pain with palpation. Do not feel that imaging is necessary as there was no preceding accident, injury, or trauma to suspect fracture. Urinary symptoms have resolved and there is no CVA tenderness on exam, so low suspicion for nephrolithiasis vs pyelonephritis.  Denies exertional chest pain, dyspnea, or orthopnea and cardiac/resp exam was normal. Poor tolerance of NSAIDs, prescribed 5 day course of 40 mg prednisone to aid in reducing inflammation. Also prescribed PRN methocarbamol. Discussed conservative management including as needed acetaminophen, ice/heat, gentle stretches, and topical analgesics. Referral placed to physical therapy for further evaluation of current symptoms. Follow-up if symptoms fail to improve despite above interventions. Patient understands and is agreeable to plan as discussed in clinic.  - predniSONE (DELTASONE) 20 MG tablet; Take 2 tablets (40 mg) by mouth daily for 5 days.  - methocarbamol (ROBAXIN) 500 MG tablet; Take 1 tablet (500 mg) by mouth 4 times daily as needed for muscle spasms.  - Physical Therapy  Referral; Future      Subjective   Triny is a 54 year old, presenting for the following health issues:  Back Pain (Mid back)      11/26/2024     9:47 AM   Additional Questions   Roomed by Rose HOOK     History of Present Illness       Back Pain:  She presents for follow up of back pain. Patient's back pain is a new problem.    Original cause of back pain: not sure  First noticed back pain: 1-4 weeks ago  Patient feels back pain: constantlyLocation of back pain:  Right upper back  Description of back pain: dull ache, sharp and other  Back pain spreads: nowhere    Since  "patient first noticed back pain, pain is: gradually worsening  Does back pain interfere with her job:  No  On a scale of 1-10 (10 being the worst), patient describes pain as:  7  What makes back pain worse: bending, certain positions, sitting, standing and twisting   Acupuncture: not tried  Acetaminophen: not helpful  Activity or exercise: not helpful  Chiropractor:  Not tried  Cold: not helpful  Heat: not helpful  Massage: not helpful  Muscle relaxants: not tried  NSAIDS: not helpful  Opioids: not tried  Physical Therapy: not tried  Rest: helpful  Steroid Injection: not tried  Stretching: not helpful  Surgery: not tried  TENS unit: not tried  Topical pain relievers: not tried  Other healthcare providers patient is seeing for back pain: None She is missing 1 dose(s) of medications per week.  She is not taking prescribed medications regularly due to other.     Presents with concerns of ongoing right mid thoracic back pain that radiates around to right lateral/mid-axillary chest wall. Pain has been ongoing for the past 2 weeks. Has attempted acetaminophen, ibuprofen, massage, and ice/heat without symptom improvement. Reports that stretching feels good but does not improve the pain. Pain progresses as the day goes on and is aggravated by movement. Denies preceding accident, injury, or trauma.     Was seen at [facility] on 10/1/24 and diagnosed with UTI and started on Keflex. Completed x2 rounds of Keflex. Urinary symptoms have resolved.     Denies fever, chills, URI symptoms, exertional chest pain, dyspnea, or orthopnea.         Objective    /80   Pulse 77   Temp 97.1  F (36.2  C) (Temporal)   Resp 18   Ht 1.753 m (5' 9.02\")   Wt 111.6 kg (246 lb)   LMP  (LMP Unknown)   SpO2 98%   BMI 36.31 kg/m    Body mass index is 36.31 kg/m .  Physical Exam  Vitals reviewed.   Constitutional:       General: She is not in acute distress.     Appearance: Normal appearance. She is not ill-appearing.   Cardiovascular:      " Rate and Rhythm: Normal rate and regular rhythm.      Heart sounds: Normal heart sounds, S1 normal and S2 normal. No murmur heard.  Pulmonary:      Effort: Pulmonary effort is normal. No respiratory distress.      Breath sounds: Normal breath sounds. No wheezing.   Abdominal:      General: Abdomen is flat. There is no distension.      Palpations: Abdomen is soft.      Tenderness: There is no abdominal tenderness. There is no right CVA tenderness, left CVA tenderness, guarding or rebound.   Musculoskeletal:      Cervical back: Normal.      Thoracic back: Tenderness (Tenderness to palpation along lower thoracic paraspinal muscles that radiates into right ribs.) present. Normal range of motion.      Right lower leg: No edema.      Left lower leg: No edema.   Skin:     General: Skin is warm and dry.      Capillary Refill: Capillary refill takes less than 2 seconds.      Findings: No lesion or rash.   Neurological:      Mental Status: She is alert.   Psychiatric:         Attention and Perception: Attention and perception normal.         Mood and Affect: Mood and affect normal.              Signed Electronically by: BINU Canas CNP

## 2024-11-26 NOTE — PATIENT INSTRUCTIONS
I do suspect a muscular nature for your current symptoms based on being able to reproduce pain with palpation and pressure. I prescribed a prednisone burst to help reduce inflammation as NSAIDs are not well tolerated. Please take the prednisone in the morning with food to prevent stomach upset and sleep disturbance. I have also prescribed a muscle relaxer called methocarbamol (Robaxin). You can take up to 4 times per day as needed for muscle spasms or tightness. This medication can make you sleepy so do not drive or operate machinery while taking. Continue with as needed acetaminophen (Tylenol), topical pain relievers (lidocaine, icy hot, biofreeze), and ice/heat applications for additional pain relief. I placed a referral to physical therapy for further evaluation if symptoms persist despite above interventions.

## 2024-12-04 ENCOUNTER — VIRTUAL VISIT (OUTPATIENT)
Dept: FAMILY MEDICINE | Facility: OTHER | Age: 54
End: 2024-12-04
Payer: COMMERCIAL

## 2024-12-04 ENCOUNTER — TELEPHONE (OUTPATIENT)
Dept: FAMILY MEDICINE | Facility: OTHER | Age: 54
End: 2024-12-04

## 2024-12-04 DIAGNOSIS — M54.6 ACUTE RIGHT-SIDED THORACIC BACK PAIN: ICD-10-CM

## 2024-12-04 DIAGNOSIS — E66.01 MORBID OBESITY (H): Primary | ICD-10-CM

## 2024-12-04 PROCEDURE — 99214 OFFICE O/P EST MOD 30 MIN: CPT | Mod: 95 | Performed by: PHYSICIAN ASSISTANT

## 2024-12-04 NOTE — TELEPHONE ENCOUNTER
Patient calling stating  orlistat medication was not at pharmacy.  PA started.  Informed patient we will reach out once we hear back from insurance.

## 2024-12-04 NOTE — TELEPHONE ENCOUNTER
Prior Authorization Retail Medication Request    Medication/Dose: orlistat (CHRISTINE) 60 MG capsule   Diagnosis and ICD code (if different than what is on RX):     New/renewal/insurance change PA/secondary ins. PA:  Previously Tried and Failed:     Rationale:       Insurance   Primary:    Insurance ID:       Secondary (if applicable):   Insurance ID:       Pharmacy Information (if different than what is on RX)  Name:     Phone:     Fax:     Clinic Information  Preferred routing pool for dept communication: [facility] pool

## 2024-12-04 NOTE — TELEPHONE ENCOUNTER
Rec'd called from insurance rep - Drug is not on preferred list need to complete formulary/coverage exception forms to be completion on [email or web address]

## 2024-12-04 NOTE — PROGRESS NOTES
Triny is a 54 year old who is being evaluated via a billable video visit.    How would you like to obtain your AVS? MyChart  If the video visit is dropped, the invitation should be resent by: Text to cell phone: [phone]  Will anyone else be joining your video visit? No    Assessment & Plan     ICD-10-CM    1. Morbid obesity (H)  E66.01       2. Acute right-sided thoracic back pain  M54.6         1. Obesity  Patient has failed on topiramate in the past due to side effects, and now has discontinued phentermine at a dose of 37.5 mg daily due to palpitations, tachycardia, and lightheadedness. She had not lost any weight at this dose when she was taking it as prescribed as she is still 239-240 lbs. She is feeling discouraged as she has continued to follow a strict diet. The use of orlistat was discussed with the patient as she has not tried this before. Patient was educated this this medication can help patients lose weight through stools by blocking fat absorption and she agreed to try it. Orlistat 60 mg at every meal was prescribed. Medication use and side effects including GI discomfort and loose stools were reviewed with the patient. Recheck 1-2 months      2. Back Pain  Patient was educated that back pain often takes awhile to resolve, but it is a good sign that she is improving. Continued stretching was encouraged. Patient voiced their understanding. Reviewed visit note from when she was seen in clinic       Review of the result(s) of each unique test - None     Diagnosis or treatment significantly limited by social determinants of health - None     18 minutes spent on the date of the encounter doing chart review, history and exam, documentation and further activities as noted above    The patient indicates understanding of these issues and agrees with the plan.    Follow up: 1-2 months     Dashawn PURCELL PA-C,  was present with the PA student who participated in the service and in the  documentation of the note.  I have verified the history and personally performed the physical exam and medical decision making.  I agree with the assessment and plan of care as documented in the note.     SARBJIT WarrenS   [facility]     Dashawn Schmidt PA-C  [facility]     Alvin Agosto is a 54 year old, presenting for the following health issues:  Weight Loss        12/4/2024     8:59 AM   Additional Questions   Roomed by Marisol HARVEY         12/4/2024     8:59 AM   Patient Reported Additional Medications   Patient reports taking the following new medications multi vitamin     History of Present Illness       Back Pain:  She presents for follow up of back pain. Patient's back pain is a new problem.    Original cause of back pain: not sure  First noticed back pain: 1-4 weeks ago  Patient feels back pain: constantlyLocation of back pain:  Right upper back  Description of back pain: dull ache, sharp and other  Back pain spreads: nowhere    Since patient first noticed back pain, pain is: gradually worsening  Does back pain interfere with her job:  No  On a scale of 1-10 (10 being the worst), patient describes pain as:  7  What makes back pain worse: bending, certain positions, sitting, standing and twisting   Acupuncture: not tried  Acetaminophen: not helpful  Activity or exercise: not helpful  Chiropractor:  Not tried  Cold: not helpful  Heat: not helpful  Massage: not helpful  Muscle relaxants: not tried  NSAIDS: not helpful  Opioids: not tried  Physical Therapy: not tried  Rest: helpful  Steroid Injection: not tried  Stretching: not helpful  Surgery: not tried  TENS unit: not tried  Topical pain relievers: not tried  Other healthcare providers patient is seeing for back pain: None She is missing 1 dose(s) of medications per week.  She is not taking prescribed medications regularly due to other.       Medication Followup of Phentermine  Taking Medication as  "prescribed: NO-stopped 3 days ago  Side Effects:  lightheadedness, tachycardia, palpitations  Medication Helping Symptoms:  No weight loss    Triny is a 54-year-old presenting for weight loss and back pain re-check. She states she stopped phentermine three days ago due to worsening palpitations and lightheadedness to the point she didn't think she should drive. She has not lost any weight on this third dose of phentermine as she is 239-240 lbs currently. Her appetite is way down, feels this is under control as she is fasting with one main meal and one smaller meal with no snacking. She is continuing to try to increase exercise. She is wondering what to do next for weight loss as she feels like she wants to give up. States she would be very happy with another 20 lb weight loss, although this isn't her ultimate goal.  Patient has also had continued right sided thoracic pain although it is improving. Patient took five days of prednisone but discontinued methocarbamol due to making her lightheadedness worse. She does state it is improving as it is becoming easier to move around recently and she is able to stretch.    Review of Systems  Constitutional, neuro, ENT, endocrine, pulmonary, cardiac, gastrointestinal, genitourinary, musculoskeletal, integument and psychiatric systems are negative, except as otherwise noted.      Objective    Vitals - Patient Reported  Weight (Patient Reported): 108.9 kg (240 lb)  Height (Patient Reported): 175.3 cm (5' 9\")  BMI (Based on Pt Reported Ht/Wt): 35.44    Physical Exam   GENERAL: alert and no distress  EYES: Eyes grossly normal to inspection.  No discharge or erythema, or obvious scleral/conjunctival abnormalities.  RESP: No audible wheeze, cough, or visible cyanosis.    SKIN: Visible skin clear. No significant rash, abnormal pigmentation or lesions.  NEURO: Cranial nerves grossly intact.  Mentation and speech appropriate for age.  PSYCH: Appropriate affect, tone, and pace of " words    Diagnostics: none       Video-Visit Details    Type of service:  Video Visit   Originating Location (pt. Location): Home  Distant Location (provider location):  Off-site  Platform used for Video Visit: [place]  Signed Electronically by: Nena Schmidt PA-C

## 2024-12-05 NOTE — TELEPHONE ENCOUNTER
Retail Pharmacy Prior Authorization Team  Phone: [phone]    PRIOR AUTHORIZATION DENIED    Medication: CHRISTINE 60 MG PO CAPS  Insurance Company: [employer] - Phone [phone] Fax [phone]  Denial Date: 12/5/2024  Denial Reason(s):         Appeal Information:         Patient Notified: NO- Unfortunately, we cannot call the patient with denials because we do not know what next steps the MD will take nor can we give medical advice, please notify the patient of what they are to expect for the continuation of their therapy from the provider.

## 2024-12-08 NOTE — TELEPHONE ENCOUNTER
Please let patient know they want her to try the OTC version and pay out of pocket first, which I am in agreement with as going right to the top dose could have adverse events. So she should try that for 1 month and then we can send the increased prescription dose.     Dashawn Schmidt PA-C  [facility]

## 2025-01-08 ENCOUNTER — THERAPY VISIT (OUTPATIENT)
Dept: PHYSICAL THERAPY | Facility: CLINIC | Age: 55
End: 2025-01-08
Payer: COMMERCIAL

## 2025-01-08 DIAGNOSIS — R07.89 CHEST WALL PAIN: ICD-10-CM

## 2025-01-08 DIAGNOSIS — M54.6 ACUTE RIGHT-SIDED THORACIC BACK PAIN: ICD-10-CM

## 2025-01-08 PROCEDURE — 97161 PT EVAL LOW COMPLEX 20 MIN: CPT | Mod: GP | Performed by: PHYSICAL THERAPIST

## 2025-01-08 PROCEDURE — 97110 THERAPEUTIC EXERCISES: CPT | Mod: GP | Performed by: PHYSICAL THERAPIST

## 2025-01-08 NOTE — PROGRESS NOTES
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 Overall is seems like the more she moves the worse it gets , PMH none reported has lost 14 lbs had right shoulder and rib out sitting at desk 12 hours a day . Has had 3 back surgeries 2012 , and 2 prior to that in low back and no fusions  has constant left to 2,3 toe tingling ,      Presenting condition or subjective complaint: (Patient-Rptd) refered by   Date of onset: 11/08/24    Relevant medical history: (Patient-Rptd) Overweight   Dates & types of surgery: (Patient-Rptd) see med history    Prior diagnostic imaging/testing results:       Prior therapy history for the same diagnosis, illness or injury: (Patient-Rptd) No      Prior Level of Function  Transfers: Independent  Ambulation: Independent  ADL: Independent  IADL:  independent     Living Environment  Social support: (Patient-Rptd) With a significant other or spouse   Type of home: (Patient-Rptd) House; Multi-level   Stairs to enter the home:         Ramp: (Patient-Rptd) No   Stairs inside the home: (Patient-Rptd) Yes (Patient-Rptd) 18 Is there a railing: (Patient-Rptd) Yes     Help at home:    Equipment owned:       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  Hobbies/Interests:      Patient goals for therapy: (Patient-Rptd) move without pain    Pain assessment:      Objective   CERVICAL SPINE EVALUATION  PAIN: mild to severe  pain right mid back   INTEGUMENTARY (edema, incisions):   POSTURE: reasonable good does sit slightly slouched but corrects easy   GAIT:   Weightbearing Status:   Assistive Device(s):   Gait Deviations:   BALANCE/PROPRIOCEPTION:   WEIGHTBEARING ALIGNMENT:   ROM: all motions feel stiff and tight but better after movement and does have full AROM   Strength able to stand on heels and toes   MYOTOMES:   DTR S:   CORD SIGNS:   DERMATOMES: tingling right 2,3 toes constant x 12 years   NEURAL TENSION:   FLEXIBILITY: 90/90 left 0 right 15    SPECIAL TESTS: SLR neg   PALPATION: right mid back to laterial trunk   SPINAL SEGMENTAL CONCLUSIONS:       Assessment & Plan   CLINICAL IMPRESSIONS  Medical Diagnosis: acute right sided thoracic back pain M54.6 chest wall pain R07.89    Treatment Diagnosis: right low mid back   Impression/Assessment: Patient is a 54 year old female with right mid back  complaints.  The following significant findings have been identified: Pain, Decreased ROM/flexibility, Decreased strength, Impaired muscle performance, and Decreased activity tolerance. These impairments interfere with their ability to perform self care tasks, work tasks, recreational activities, and household chores as compared to previous level of function.     Clinical Decision Making (Complexity):  Clinical Presentation: Stable/Uncomplicated  Clinical Presentation Rationale: based on medical and personal factors listed in PT evaluation  Clinical Decision Making (Complexity): Low complexity    PLAN OF CARE  Treatment Interventions:  Modalities:  as needed   Interventions: Manual Therapy, Neuromuscular Re-education, Therapeutic Activity, Therapeutic Exercise    Long Term Goals     PT Goal 1  Goal Identifier: 1  Goal Description: instruction in HEP and compliant with it 5 of 7 days to improve quality of life  Target Date: 04/07/25  PT Goal 2  Goal Identifier: 2  Goal Description: patient to have reduction in pain level currently mild to  severe goal is mild only pain  Target Date: 04/07/25  PT Goal 3  Goal Identifier: 3  Goal Description: patient to have improved tolerance to activity currently BRUNO 18 goal is less than 15  Target Date: 04/07/25      Frequency of Treatment: 4 Rx over 90 days  Duration of Treatment:      Recommended Referrals to Other Professionals:   Education Assessment:   Learner/Method: Patient;Listening;Reading;Demonstration;Pictures/Video;No Barriers to Learning    Risks and benefits of evaluation/treatment have been explained.   Patient/Family/caregiver agrees with Plan of Care.     Evaluation Time:     PT Eval, Low Complexity Minutes ([id number]): 15       Signing Clinician: Ruthie Pena, PT

## 2025-04-09 ASSESSMENT — PATIENT HEALTH QUESTIONNAIRE - PHQ9
10. IF YOU CHECKED OFF ANY PROBLEMS, HOW DIFFICULT HAVE THESE PROBLEMS MADE IT FOR YOU TO DO YOUR WORK, TAKE CARE OF THINGS AT HOME, OR GET ALONG WITH OTHER PEOPLE: SOMEWHAT DIFFICULT
SUM OF ALL RESPONSES TO PHQ QUESTIONS 1-9: 4
SUM OF ALL RESPONSES TO PHQ QUESTIONS 1-9: 4

## 2025-04-10 ENCOUNTER — OFFICE VISIT (OUTPATIENT)
Dept: FAMILY MEDICINE | Facility: CLINIC | Age: 55
End: 2025-04-10
Payer: COMMERCIAL

## 2025-04-10 ENCOUNTER — HOSPITAL ENCOUNTER (OUTPATIENT)
Dept: GENERAL RADIOLOGY | Facility: CLINIC | Age: 55
Discharge: HOME | End: 2025-04-10
Payer: COMMERCIAL

## 2025-04-10 VITALS
RESPIRATION RATE: 18 BRPM | HEIGHT: 69 IN | TEMPERATURE: 97.9 F | BODY MASS INDEX: 34.16 KG/M2 | HEART RATE: 72 BPM | DIASTOLIC BLOOD PRESSURE: 70 MMHG | OXYGEN SATURATION: 100 % | SYSTOLIC BLOOD PRESSURE: 128 MMHG | WEIGHT: 230.6 LBS

## 2025-04-10 DIAGNOSIS — M54.6 ACUTE RIGHT-SIDED THORACIC BACK PAIN: ICD-10-CM

## 2025-04-10 DIAGNOSIS — M54.6 ACUTE RIGHT-SIDED THORACIC BACK PAIN: Primary | ICD-10-CM

## 2025-04-10 DIAGNOSIS — R39.11 HESITANCY OF MICTURITION: ICD-10-CM

## 2025-04-10 DIAGNOSIS — N30.00 ACUTE CYSTITIS WITHOUT HEMATURIA: ICD-10-CM

## 2025-04-10 DIAGNOSIS — R11.2 NAUSEA AND VOMITING, UNSPECIFIED VOMITING TYPE: ICD-10-CM

## 2025-04-10 LAB
ALBUMIN UR-MCNC: NEGATIVE MG/DL
APPEARANCE UR: ABNORMAL
BACTERIA #/AREA URNS HPF: ABNORMAL /HPF
BILIRUB UR QL STRIP: NEGATIVE
COLOR UR AUTO: YELLOW
GLUCOSE UR STRIP-MCNC: NEGATIVE MG/DL
HGB UR QL STRIP: NEGATIVE
KETONES UR STRIP-MCNC: NEGATIVE MG/DL
LEUKOCYTE ESTERASE UR QL STRIP: ABNORMAL
MUCOUS THREADS #/AREA URNS LPF: PRESENT /LPF
NITRATE UR QL: NEGATIVE
PH UR STRIP: 5.5 [PH] (ref 5–7)
RBC URINE: 2 /HPF
SP GR UR STRIP: 1.02 (ref 1–1.03)
SQUAMOUS EPITHELIAL: 4 /HPF
UROBILINOGEN UR STRIP-MCNC: NORMAL MG/DL
WBC URINE: 6 /HPF

## 2025-04-10 PROCEDURE — 72070 X-RAY EXAM THORAC SPINE 2VWS: CPT

## 2025-04-10 RX ORDER — CELECOXIB 100 MG/1
100 CAPSULE ORAL 2 TIMES DAILY
Qty: 30 CAPSULE | Refills: 0 | Status: SHIPPED | OUTPATIENT
Start: 2025-04-10

## 2025-04-10 RX ORDER — METHYLPREDNISOLONE 4 MG/1
TABLET ORAL
Qty: 21 TABLET | Refills: 0 | Status: SHIPPED | OUTPATIENT
Start: 2025-04-10

## 2025-04-10 RX ORDER — ONDANSETRON 4 MG/1
4 TABLET, ORALLY DISINTEGRATING ORAL EVERY 8 HOURS PRN
Qty: 30 TABLET | Refills: 0 | Status: SHIPPED | OUTPATIENT
Start: 2025-04-10

## 2025-04-10 RX ORDER — NITROFURANTOIN 25; 75 MG/1; MG/1
100 CAPSULE ORAL 2 TIMES DAILY
Qty: 10 CAPSULE | Refills: 0 | Status: SHIPPED | OUTPATIENT
Start: 2025-04-10 | End: 2025-04-15

## 2025-04-10 ASSESSMENT — ENCOUNTER SYMPTOMS: BACK PAIN: 1

## 2025-04-10 ASSESSMENT — PAIN SCALES - GENERAL: PAINLEVEL_OUTOF10: MODERATE PAIN (5)

## 2025-04-10 NOTE — PATIENT INSTRUCTIONS
ASSESSMENT & PLAN    Right mid back pain  - Chronic and constant pain, possibly not muscular as previous treatments have been ineffective. Concern for potential kidney involvement.  - Prescribe Celebrex for pain management and a methylprednisolone pack as an anti-inflammatory. Order X-ray imaging and urinalysis to investigate further. Referral to a spine specialist as a contingency plan if current measures do not improve the condition.    Nausea and vomiting  - Nausea and vomiting potentially related to weight loss medication.  - Prescribe Zofran for nausea. Adjust weight loss medication dosage as per previous instructions from the patient's physician.      Celebrex twice a day     Zofran as needed for nasuea    Follow up with spine specialist    Xray back    Discussed side effects due to zepbound     Lidocaine patches for 12 hours on, 12 hours off     Tylenol as needed     Diclofenac gel 4 times a day scheduled     Heat pack for 10 minutes followed by exercises 4-5 times a day     Follow up with any new, worsening, or persistent symptoms     Go to the ER in the case of an emergency

## 2025-04-10 NOTE — RESULT ENCOUNTER NOTE
Hello -    Here are my comments about the recent results.  Your urine is cloudy and shows evidence of urinary tract infection.  I recommend treat with Macrobid twice a day for 5 days.  This medication has been sent to your pharmacy.    Please let us know if you have any questions or concerns.    Regards,  Linda Cisneros PA-C

## 2025-04-10 NOTE — PROGRESS NOTES
"  Assessment & Plan     Acute right-sided thoracic back pain  - celecoxib (CELEBREX) 100 MG capsule; Take 1 capsule (100 mg) by mouth 2 times daily.  - XR Thoracic Spine 2 Views; Future  - methylPREDNISolone (MEDROL DOSEPAK) 4 MG tablet therapy pack; Follow Package Directions  - Spine  Referral; Future    Hesitancy of micturition  - UA Macroscopic with reflex to Microscopic and Culture - Lab Collect; Future  - UA Macroscopic with reflex to Microscopic and Culture - Lab Collect    Nausea and vomiting, unspecified vomiting type  - ondansetron (ZOFRAN ODT) 4 MG ODT tab; Take 1 tablet (4 mg) by mouth every 8 hours as needed for nausea.          BMI  Estimated body mass index is 34.05 kg/m  as calculated from the following:    Height as of this encounter: 1.753 m (5' 9\").    Weight as of this encounter: 104.6 kg (230 lb 9.6 oz).         See Patient Instructions  Patient Instructions   ASSESSMENT & PLAN    Right mid back pain  - Chronic and constant pain, possibly not muscular as previous treatments have been ineffective. Concern for potential kidney involvement.  - Prescribe Celebrex for pain management and a methylprednisolone pack as an anti-inflammatory. Order X-ray imaging and urinalysis to investigate further. Referral to a spine specialist as a contingency plan if current measures do not improve the condition.    Nausea and vomiting  - Nausea and vomiting potentially related to weight loss medication.  - Prescribe Zofran for nausea. Adjust weight loss medication dosage as per previous instructions from the patient's physician.      Celebrex twice a day     Zofran as needed for nasuea    Follow up with spine specialist    Xray back    Discussed side effects due to zepbound     Lidocaine patches for 12 hours on, 12 hours off     Tylenol as needed     Diclofenac gel 4 times a day scheduled     Heat pack for 10 minutes followed by exercises 4-5 times a day     Follow up with any new, worsening, or persistent " symptoms     Go to the ER in the case of an emergency      Subjective   Triny is a 54 year old, presenting for the following health issues:  Back Pain (ongoing)      4/10/2025    12:48 PM   Additional Questions   Roomed by Xuan LAURENT         4/10/2025    12:48 PM   Patient Reported Additional Medications   Patient reports taking the following new medications probiotic, multivitamin     Via the Health Maintenance questionnaire, the patient has reported the following services have been completed -Mammogram: [facility] 2023-06-10, this information has not been sent to the abstraction team.  Back Pain     History of Present Illness       Back Pain:  She presents for follow up of back pain. Patient's back pain is a chronic problem.  Location of back pain:  Right middle of back  Description of back pain: dull ache and sharp  Back pain spreads: nowhere    Since patient first noticed back pain, pain is: always present, but gets better and worse  Does back pain interfere with her job:  No      She is taking medications regularly.      SUBJECTIVE    The patient is a 54-year-old female experiencing persistent right mid back pain that has been ongoing for several months. She describes the pain as constant and unresponsive to various treatments, including hot and cold therapy, lidocaine, stretching, Tylenol, ibuprofen, and muscle relaxants. The pain does not cause sharp discomfort when pressure is applied but is bothersome in various positions, such as standing, sitting, and lying down. Despite the discomfort, it does not disturb her sleep.    In addition to back pain, she reports feeling fatigued with no energy, describing it as a lack of energy rather than sleepiness. She also experiences nausea and has vomited more in the past two months than in her previous 50 years. The vomiting began around March 1, 2025. She has been on various weight loss medications, which she believes may have contributed to her nausea and  "vomiting. Her last dose of the medication was a week ago, and she has since stopped taking it due to feeling unwell. She has lost 36 pounds through the combination of medications and other efforts.    The patient mentions a past urinary tract infection (UTI) that required two rounds of antibiotics, after which her back pain began. She is currently urinating less frequently and has reduced her fluid intake from 40-60 ounces to about 32 ounces per day. She finds it difficult to consume protein, particularly meat, as her body seems to reject it.    She has a history of bronchitis, which has required steroid treatment in the past. She has undergone three back surgeries previously and expresses a lack of enthusiasm for seeing spine specialists due to her past experiences.  OBJECTIVE    Physical exam:  - Pain in right mid-back, described as deeper rather than superficial  - No sharp pain upon palpation    Imaging results:   - X-ray of the back ordered    Test results:   - Urine test ordered                    Objective    /70   Pulse 72   Temp 97.9  F (36.6  C) (Temporal)   Resp 18   Ht 1.753 m (5' 9\")   Wt 104.6 kg (230 lb 9.6 oz)   SpO2 100%   BMI 34.05 kg/m    Body mass index is 34.05 kg/m .  Physical Exam   GENERAL: alert and no distress  EYES: Eyes grossly normal to inspection, PERRL and conjunctivae and sclerae normal  MS: no gross musculoskeletal defects noted, no edema.  Right thoracic back is tender to palpation  SKIN: no suspicious lesions or rashes  NEURO: Normal strength and tone, mentation intact and speech normal  PSYCH: mentation appears normal, affect normal/bright    Admission on 10/01/2024, Discharged on 10/01/2024   Component Date Value Ref Range Status    Color Urine 10/01/2024 Yellow  Colorless, Straw, Light Yellow, Yellow Final    Appearance Urine 10/01/2024 Cloudy (A)  Clear Final    Glucose Urine 10/01/2024 Negative  Negative mg/dL Final    Bilirubin Urine 10/01/2024 Negative  Negative " Final    Ketones Urine 10/01/2024 Negative  Negative mg/dL Final    Specific Gravity Urine 10/01/2024 1.016  1.003 - 1.035 Final    Blood Urine 10/01/2024 Large (A)  Negative Final    pH Urine 10/01/2024 6.0  5.0 - 7.0 Final    Protein Albumin Urine 10/01/2024 100 (A)  Negative mg/dL Final    Urobilinogen Urine 10/01/2024 Normal  Normal, 2.0 mg/dL Final    Nitrite Urine 10/01/2024 Negative  Negative Final    Leukocyte Esterase Urine 10/01/2024 Large (A)  Negative Final    Bacteria Urine 10/01/2024 Few (A)  None Seen /HPF Final    WBC Clumps Urine 10/01/2024 Present (A)  None Seen /HPF Final    Mucus Urine 10/01/2024 Present (A)  None Seen /LPF Final    RBC Urine 10/01/2024 >182 (H)  <=2 /HPF Final    WBC Urine 10/01/2024 >182 (H)  <=5 /HPF Final    Squamous Epithelials Urine 10/01/2024 5 (H)  <=1 /HPF Final    Transitional Epithelials Urine 10/01/2024 3 (H)  <=1 /HPF Final    Culture 10/01/2024 >100,000 CFU/mL Escherichia coli (A)   Final     No results found for any visits on 04/10/25.  No results found.        Signed Electronically by: Linda Cisneros PA-C

## 2025-04-12 ENCOUNTER — MYC MEDICAL ADVICE (OUTPATIENT)
Dept: FAMILY MEDICINE | Facility: CLINIC | Age: 55
End: 2025-04-12
Payer: COMMERCIAL

## 2025-04-14 ENCOUNTER — PATIENT OUTREACH (OUTPATIENT)
Dept: CARE COORDINATION | Facility: CLINIC | Age: 55
End: 2025-04-14
Payer: COMMERCIAL

## 2025-04-14 NOTE — TELEPHONE ENCOUNTER
RN Triage    Patient Contact    Attempt # 1    Was call answered?  No.  Left message on voicemail with information to call me back and sent MCM.    Patient seen 4/10/25 for acute right-sided back pain. Diagnosed with UTI and prescribed Macrobid BID for 5 days. Xray of back (-).    Jeannette Villa RN on 4/14/2025 at 9:37 AM

## 2025-04-15 NOTE — TELEPHONE ENCOUNTER
Patient feels like her UTI is better, she reports her symptoms are much better. She also feels as though her back pain is improving with prednisone.    She would like a follow up UA to ensure the UTI has cleared. Please advise.    Lauren Lagunas RN on 4/15/2025 at 12:43 PM

## 2025-04-20 ENCOUNTER — HEALTH MAINTENANCE LETTER (OUTPATIENT)
Age: 55
End: 2025-04-20

## 2025-06-16 ENCOUNTER — PATIENT OUTREACH (OUTPATIENT)
Dept: CARE COORDINATION | Facility: CLINIC | Age: 55
End: 2025-06-16
Payer: COMMERCIAL

## 2025-06-23 ENCOUNTER — RESULTS FOLLOW-UP (OUTPATIENT)
Dept: FAMILY MEDICINE | Facility: CLINIC | Age: 55
End: 2025-06-23

## 2025-06-23 ENCOUNTER — OFFICE VISIT (OUTPATIENT)
Dept: FAMILY MEDICINE | Facility: CLINIC | Age: 55
End: 2025-06-23
Payer: COMMERCIAL

## 2025-06-23 VITALS
HEART RATE: 77 BPM | WEIGHT: 220.2 LBS | SYSTOLIC BLOOD PRESSURE: 124 MMHG | OXYGEN SATURATION: 99 % | BODY MASS INDEX: 32.52 KG/M2 | TEMPERATURE: 98.3 F | RESPIRATION RATE: 18 BRPM | DIASTOLIC BLOOD PRESSURE: 86 MMHG

## 2025-06-23 DIAGNOSIS — R10.11 RUQ ABDOMINAL PAIN: Primary | ICD-10-CM

## 2025-06-23 DIAGNOSIS — M54.6 CHRONIC RIGHT-SIDED THORACIC BACK PAIN: ICD-10-CM

## 2025-06-23 DIAGNOSIS — G89.29 CHRONIC RIGHT-SIDED THORACIC BACK PAIN: ICD-10-CM

## 2025-06-23 DIAGNOSIS — R79.89 ELEVATED SERUM CREATININE: Primary | ICD-10-CM

## 2025-06-23 DIAGNOSIS — K21.9 GASTROESOPHAGEAL REFLUX DISEASE WITHOUT ESOPHAGITIS: ICD-10-CM

## 2025-06-23 DIAGNOSIS — R10.13 EPIGASTRIC PAIN: ICD-10-CM

## 2025-06-23 PROBLEM — E66.01 MORBID OBESITY (H): Status: RESOLVED | Noted: 2021-07-08 | Resolved: 2025-06-23

## 2025-06-23 LAB
ALBUMIN SERPL BCG-MCNC: 3.9 G/DL (ref 3.5–5.2)
ALP SERPL-CCNC: 50 U/L (ref 40–150)
ALT SERPL W P-5'-P-CCNC: 16 U/L (ref 0–50)
ANION GAP SERPL CALCULATED.3IONS-SCNC: 8 MMOL/L (ref 7–15)
AST SERPL W P-5'-P-CCNC: 16 U/L (ref 0–45)
BILIRUB SERPL-MCNC: 0.2 MG/DL
BUN SERPL-MCNC: 13.2 MG/DL (ref 6–20)
CALCIUM SERPL-MCNC: 9.2 MG/DL (ref 8.8–10.4)
CHLORIDE SERPL-SCNC: 104 MMOL/L (ref 98–107)
CREAT SERPL-MCNC: 1.13 MG/DL (ref 0.51–0.95)
EGFRCR SERPLBLD CKD-EPI 2021: 58 ML/MIN/1.73M2
GLUCOSE SERPL-MCNC: 97 MG/DL (ref 70–99)
HCO3 SERPL-SCNC: 27 MMOL/L (ref 22–29)
LIPASE SERPL-CCNC: 60 U/L (ref 13–60)
POTASSIUM SERPL-SCNC: 3.9 MMOL/L (ref 3.4–5.3)
PROT SERPL-MCNC: 7 G/DL (ref 6.4–8.3)
SODIUM SERPL-SCNC: 139 MMOL/L (ref 135–145)

## 2025-06-23 PROCEDURE — 99214 OFFICE O/P EST MOD 30 MIN: CPT | Performed by: FAMILY MEDICINE

## 2025-06-23 PROCEDURE — 3074F SYST BP LT 130 MM HG: CPT | Performed by: FAMILY MEDICINE

## 2025-06-23 PROCEDURE — 3079F DIAST BP 80-89 MM HG: CPT | Performed by: FAMILY MEDICINE

## 2025-06-23 PROCEDURE — 1125F AMNT PAIN NOTED PAIN PRSNT: CPT | Performed by: FAMILY MEDICINE

## 2025-06-23 PROCEDURE — 83690 ASSAY OF LIPASE: CPT | Performed by: FAMILY MEDICINE

## 2025-06-23 PROCEDURE — G2211 COMPLEX E/M VISIT ADD ON: HCPCS | Performed by: FAMILY MEDICINE

## 2025-06-23 PROCEDURE — 80053 COMPREHEN METABOLIC PANEL: CPT | Performed by: FAMILY MEDICINE

## 2025-06-23 PROCEDURE — 36415 COLL VENOUS BLD VENIPUNCTURE: CPT | Performed by: FAMILY MEDICINE

## 2025-06-23 RX ORDER — PANTOPRAZOLE SODIUM 40 MG/1
40 TABLET, DELAYED RELEASE ORAL DAILY
Qty: 30 TABLET | Refills: 1 | Status: SHIPPED | OUTPATIENT
Start: 2025-06-23

## 2025-06-23 ASSESSMENT — ENCOUNTER SYMPTOMS: BACK PAIN: 1

## 2025-06-23 ASSESSMENT — PAIN SCALES - GENERAL: PAINLEVEL_OUTOF10: MILD PAIN (3)

## 2025-06-23 NOTE — PATIENT INSTRUCTIONS
Based on our discussion, I have outlined the following instructions for you:      - You need to have some lab tests done. These tests will check how your liver is working and measure certain enzymes. You will also have a procedure called an EGD, which looks at your stomach, esophagus, and small intestines.  - After the EGD, you should have a follow-up appointment to talk about the results and what to do next.  - You will start taking a new medication to help with acid in your stomach. Take 40 mg of this medication once a day in the morning before eating anything, then eat 30 min later.    Thank you again for your visit, and we look forward to supporting you in your journey to better health.

## 2025-06-23 NOTE — PROGRESS NOTES
Assessment & Plan     ASSESSMENT/ORDERS:    ICD-10-CM    1. RUQ abdominal pain  R10.11 Comprehensive metabolic panel (BMP + Alb, Alk Phos, ALT, AST, Total. Bili, TP)     Lipase     pantoprazole (PROTONIX) 40 MG EC tablet     Adult GI  Referral - Procedure Only     Lipase     Comprehensive metabolic panel (BMP + Alb, Alk Phos, ALT, AST, Total. Bili, TP)      2. Chronic right-sided thoracic back pain  M54.6 Comprehensive metabolic panel (BMP + Alb, Alk Phos, ALT, AST, Total. Bili, TP)    G89.29 Lipase     pantoprazole (PROTONIX) 40 MG EC tablet     Adult GI  Referral - Procedure Only     Lipase     Comprehensive metabolic panel (BMP + Alb, Alk Phos, ALT, AST, Total. Bili, TP)      3. Epigastric pain  R10.13 Comprehensive metabolic panel (BMP + Alb, Alk Phos, ALT, AST, Total. Bili, TP)     Lipase     pantoprazole (PROTONIX) 40 MG EC tablet     Adult GI  Referral - Procedure Only     Lipase     Comprehensive metabolic panel (BMP + Alb, Alk Phos, ALT, AST, Total. Bili, TP)      4. Gastroesophageal reflux disease without esophagitis  K21.9 Comprehensive metabolic panel (BMP + Alb, Alk Phos, ALT, AST, Total. Bili, TP)     Lipase     pantoprazole (PROTONIX) 40 MG EC tablet     Adult GI  Referral - Procedure Only     Lipase     Comprehensive metabolic panel (BMP + Alb, Alk Phos, ALT, AST, Total. Bili, TP)          Assessment & Plan  RUQ abdominal pain:  - Pain may be related to the digestive tract, possibly involving the stomach or small intestines.  - Conduct lab work including a metabolic panel with liver function tests and lipase. Repeat EGD to assess stomach, esophagus, and small intestines.  - Follow-up recommended after EGD to discuss results and further management.    Epigastric pain:  - Epigastric area is very tender, possibly related to digestive tract issues.  - Conduct lab work including a metabolic panel with liver function tests and lipase. Repeat EGD to assess stomach,  "esophagus, and small intestines.  - Follow-up recommended after EGD to discuss results and further management.    Gastroesophageal reflux disease without esophagitis:  - Symptoms not well controlled with current medication.  - Prescribe a different acid blocker, 40 mg once daily, to be taken on an empty stomach in the morning.  - Follow-up recommended to assess effectiveness of new medication.    The longitudinal plan of care for the diagnosis(es)/condition(s) as documented were addressed during this visit. Due to the added complexity in care, I will continue to support Triny in the subsequent management and with ongoing continuity of care.            BMI  Estimated body mass index is 32.52 kg/m  as calculated from the following:    Height as of 4/10/25: 1.753 m (5' 9\").    Weight as of this encounter: 99.9 kg (220 lb 3.2 oz).             Alvin Agosto is a 55 year old, presenting for the following health issues:  Back Pain        6/23/2025     1:46 PM   Additional Questions   Roomed by Darrel ARANDA     Back Pain     History of Present Illness       Reason for visit:  Consistent, right side, back,/side, pain.   She is taking medications regularly.   - Triny Bower, 55-year-old female  - Consistent right-sided back pain, ongoing for several months  - Pain does not improve with any activity or treatment  - Initial visit to provider in [place], suspected muscle-related back pain, referred to physical therapy  - Physical therapy did not provide relief; therapist unable to identify the cause  - Subsequent visit to different provider in [place], underwent X-rays and urine tests to rule out kidney issues; results were normal  - History of a severe UTI in late October, onset was rapid, leading to emergency room visit due to peeing blood  - Follow-up with colleague revealed another UTI, treated with antibiotics  - Three past back surgeries  - Struggles with constipation intermittently  - Dietary changes made, " including reducing processed foods and sugar intake  - Experiences severe heartburn, exacerbated by high sugar or salty foods; omeprazole provides partial relief  - Pain level reached 11 two days ago, resulting in early bedtime due to severe discomfort  - Previous prescription of prednisone and pain relievers provided no relief  - Muscle relaxants tried without effect  - Gallbladder removed approximately 2.5 years ago; chronic cholecystitis noted in pathology report, no gallstones present  - Post-gallbladder removal symptoms include persistent heartburn and dietary intolerance  - EGD performed prior to gallbladder removal showed irregularity at the esophagus-stomach junction and removal of two stomach polyps  - Recent X-rays showed no abnormalities related to back pain  - History of weight loss medication causing sickness, discontinued after losing 42 lbs due to frequent vomiting  - Inconsistent use of over-the-counter omeprazole, sometimes taking two pills a day instead of one  - Pertinent negatives: No black, sticky, tarry stools; no dark stools; not throwing up                  Objective    /86   Pulse 77   Temp 98.3  F (36.8  C) (Temporal)   Resp 18   Wt 99.9 kg (220 lb 3.2 oz)   SpO2 99%   BMI 32.52 kg/m    Body mass index is 32.52 kg/m .  Physical Exam   - CARDIOVASCULAR: Heart regular rate and rhythm, no murmurs.  - LUNGS: Lungs clear, no wheezes.  - ABDOMEN: Epigastric tenderness, diffuse abdominal tenderness. The patient reports that the entire abdomen feels tender, with specific soreness in the epigastric area. The tenderness is not sharp or shooting but is described as deep and persistent. The patient winced during palpation of the epigastric region, indicating significant discomfort. RUQ also present but not as severe. The tenderness is noted to be worse in the epigastric area, with the patient expressing that it has been consistently sore since the gallbladder removal.    Results    -  X-rays: Normal, no significant findings related to back issues.  - Urinalysis: Showed presence of a urinary tract infection (UTI) during a previous visit.  - EGD (Esophagogastroduodenoscopy) prior to gallbladder removal: Showed irregularity at the esophagus-stomach junction and two polyps were removed from the stomach.             Signed Electronically by: Dallas Garcia MD

## 2025-06-24 ENCOUNTER — TELEPHONE (OUTPATIENT)
Dept: GASTROENTEROLOGY | Facility: CLINIC | Age: 55
End: 2025-06-24
Payer: COMMERCIAL

## 2025-06-24 NOTE — TELEPHONE ENCOUNTER
"Endoscopy Scheduling Screen    Caller: patient    Have you had any respiratory illness or flu-like symptoms in the last 10 days?  No    Patient is ACTIVE on [employer].  Inform patient that all appointment instructions will be sent via [employer].    Review patient's insurance for any non participating payor.    Ordering/Referring Provider: NADINE MARIN    (If ordering provider performs procedure, schedule with ordering provider unless otherwise instructed. )    BMI: Estimated body mass index is 32.52 kg/m  as calculated from the following:    Height as of 4/10/25: 1.753 m (5' 9\").    Weight as of 6/23/25: 99.9 kg (220 lb 3.2 oz).     Sedation Ordered  moderate sedation.   If patient BMI > 50 do not schedule in [facility].    If patient BMI > 45 do not schedule at [facility].    Are you taking methadone or Suboxone?  NO, No RN review required.    Have you been diagnosed and are being treated for severe PTSD or severe anxiety?  NO, No RN review required.    Are you taking any prescription medications for pain 3 or more times per week?   NO, No RN review required.    Do you have a history of malignant hyperthermia?  No    (Females) Are you currently pregnant?   No     Have you been diagnosed or told you have pulmonary hypertension?   No    Do you have an LVAD?  No    Have you been told you have moderate to severe sleep apnea?  No.    Have you been told you have COPD, asthma, or any other lung disease?  No    Has your doctor ordered any cardiac tests like echo, angiogram, stress test, ablation, or EKG, that you have not completed yet?  No    Do you  have a history of any heart conditions?  No     Have you ever had or are you waiting for an organ transplant?  No. Continue scheduling, no site restrictions.    Have you had a stroke or transient ischemic attack (TIA aka \"mini stroke\") in the last 2 years?   No.    Have you been diagnosed with or been told you have cirrhosis of the liver?   No.    Are you currently on dialysis?   No    Do " "you need assistance transferring?   No    BMI: Estimated body mass index is 32.52 kg/m  as calculated from the following:    Height as of 4/10/25: 1.753 m (5' 9\").    Weight as of 6/23/25: 99.9 kg (220 lb 3.2 oz).     Is patients BMI > 40 and scheduling location [facility]?  No    Do you take an injectable or oral medication for weight loss or diabetes (excluding insulin)?  No    Do you take the medication Naltrexone?  No    Do you take blood thinners?  No       Prep   Are you currently on dialysis or do you have chronic kidney disease?  No    Do you have a diagnosis of diabetes?  No    Do you have a diagnosis of cystic fibrosis (CF)?  No    On a regular basis do you go 3 -5 days between bowel movements?  No    BMI > 40?  No    Preferred Pharmacy:    [id number] - [address] [address]  Phone: [phone] Fax: [phone]    Final Scheduling Details     Procedure scheduled  Upper endoscopy (EGD)    Surgeon:  HUSAM     Date of procedure:  7/9/25     Pre-OP / PAC:   No - Not required for this site.    Location  [facility] - Per order.    Sedation   MAC/Deep Sedation Per location.      Patient Reminders:   You will receive a call from a Nurse to review instructions and health history.  This assessment must be completed prior to your procedure.  Failure to complete the Nurse assessment may result in the procedure being cancelled.      On the day of your procedure, please designate an adult(s) who can drive you home stay with you for the next 24 hours. The medicines used in the exam will make you sleepy. You will not be able to drive.      You cannot take public transportation, ride share services, or non-medical taxi service without a responsible caregiver.  Medical transport services are allowed with the requirement that a responsible caregiver will receive you at your destination.  We require that drivers and caregivers are confirmed prior to your procedure.    "

## 2025-06-25 ENCOUNTER — TELEPHONE (OUTPATIENT)
Dept: GASTROENTEROLOGY | Facility: CLINIC | Age: 55
End: 2025-06-25
Payer: COMMERCIAL

## 2025-06-25 NOTE — TELEPHONE ENCOUNTER
Pre assessment completed for upcoming procedure.   (Please see previous telephone encounter notes for complete details)      Procedure details:    Procedure date 7/9/25, arrival time 0800 and facility location reviewed.    Pre op exam needed? No.    Designated  policy reviewed. Instructed to have someone stay 24  hours post procedure.       Medication review:    Medications reviewed. Please see supporting documentation below. Holding recommendations discussed (if applicable).       Prep for procedure:     Procedure prep instructions reviewed.        Any additional information needed:  N/A      Patient verbalized understanding and had no questions or concerns at this time.      Kristy Lynn LPN  Endoscopy Procedure Pre Assessment   [phone] option 3

## 2025-06-25 NOTE — TELEPHONE ENCOUNTER
Pre visit planning completed.      Procedure details:    Patient scheduled for Upper endoscopy (EGD) on 7/9/25.     Arrival time: 0800. Procedure time 0900    Facility location: [facility]; [address] [place] [address] , [place] [address]. Check in location: Main entrance at Surgery registation desk.    Sedation type: MAC    Pre op exam needed? No.    Indication for procedure:   RUQ abdominal pain   Chronic right-sided thoracic back pain   Epigastric pain   Gastroesophageal reflux disease without esophagitis         Chart review:     Electronic implanted devices? No    Recent diagnosis of diverticulitis within the last 6 weeks? No      Medication review:    Diabetic? No    Anticoagulants? No    Weight loss medication/injectable? No GLP-1 medication per patient's medication list. Nursing to verify with pre-assessment call.    Other medication HOLDING recommendations:  N/A      Prep for procedure:       Procedure information and instructions sent via "[employer]         Laila Turcios RN  Endoscopy Procedure Pre Assessment   [phone] option 3

## 2025-06-30 ENCOUNTER — PATIENT OUTREACH (OUTPATIENT)
Dept: CARE COORDINATION | Facility: CLINIC | Age: 55
End: 2025-06-30
Payer: COMMERCIAL

## 2025-07-08 ENCOUNTER — ANESTHESIA EVENT (OUTPATIENT)
Dept: GASTROENTEROLOGY | Facility: CLINIC | Age: 55
End: 2025-07-08
Payer: COMMERCIAL

## 2025-07-08 NOTE — H&P
[facility] Anesthesia Pre-op History and Physical    Triny Bower MRN# [id number]   Age: 55 year old YOB: 1970      Date of Surgery: 7/9/2025 Location [facility]      Date of Exam 7/9/2025 Facility (In hospital)       Home clinic: [facility]  Primary care provider: Dallas Garcia         Chief Complaint and/or Reason for Procedure:   No chief complaint on file.  EGD. Gerd, Abd pain, Thoracic back pain wraps around chest upper costal region..   S/p antonio 2023 hx abn hida scan.  Exam 2023, no HP.  Possible IM on Z line bx.       Active problem list:     Patient Active Problem List    Diagnosis Date Noted    Atrophic vaginitis 07/16/2024     Priority: Medium    Tendinitis of left wrist 04/04/2018     Priority: Medium    Hyperlipidemia LDL goal <130 03/22/2018     Priority: Medium    Gastroesophageal reflux disease without esophagitis 06/29/2015     Priority: Medium    Constipation, unspecified constipation type 06/29/2015     Priority: Medium    Major depressive disorder, recurrent episode, mild 10/04/2012     Priority: Medium    Displacement of lumbar intervertebral disc without myelopathy 08/21/2012     Priority: Medium    Herniated nucleus pulposus, L5-S1, right 06/06/2012     Priority: Medium     historically      Lumbar radiculopathy 12/17/2010     Priority: Medium            Medications (include herbals and vitamins):   Any Plavix use in the last 7 days? No     No current facility-administered medications for this encounter.     Current Outpatient Medications   Medication Sig Dispense Refill    ondansetron (ZOFRAN ODT) 4 MG ODT tab Take 1 tablet (4 mg) by mouth every 8 hours as needed for nausea. 30 tablet 0    pantoprazole (PROTONIX) 40 MG EC tablet Take 1 tablet (40 mg) by mouth daily. 30 tablet 1             Allergies:    No Known Allergies  Allergy to Latex? No  Allergy to tape?   No  Intolerances:             Physical Exam:    All vitals have been reviewed  No data found.  No intake/output data recorded.  Lungs:   No increased work of breathing, good air exchange, clear to auscultation bilaterally, no crackles or wheezing     Cardiovascular:   Normal apical impulse, regular rate and rhythm, normal S1 and S2, no S3 or S4, and no murmur noted             Lab / Radiology Results:            Anesthetic risk and/or ASA classification:       Prince Merchant MD

## 2025-07-08 NOTE — ANESTHESIA PREPROCEDURE EVALUATION
Anesthesia Pre-Procedure Evaluation    Patient: Triny Bower   MRN: [id number] : 1970          Procedure : Procedure(s):  Esophagoscopy, gastroscopy, duodenoscopy (EGD), combined         Past Medical History:   Diagnosis Date    Displacement of lumbar intervertebral disc without myelopathy 01/20/10    Transfer to [facility]    Hyperlipidemia LDL goal <130 3/22/2018      Past Surgical History:   Procedure Laterality Date    BACK SURGERY  2000    DILATE CERVIX, HYSTEROSCOPY, ABLATE ENDOMETRIUM NOVASURE, COMBINED N/A 2019    Procedure: Dilateand curretage cervix, hysteroscopy, ablate endometrium novasure, combined;  Surgeon: Viral Hodges MD;  Location: [facility] OR    ESOPHAGOSCOPY, GASTROSCOPY, DUODENOSCOPY (EGD), COMBINED N/A 2023    Procedure: ESOPHAGOGASTRODUODENOSCOPY, WITH BIOPSY;  Surgeon: Adryan Gaitan MD;  Location:  GI    GENITOURINARY SURGERY  2018    HEMILAMINECTOMY, DISCECTOMY LUMBAR TWO LEVELS, COMBINED  2012    Procedure: COMBINED HEMILAMINECTOMY, DISCECTOMY LUMBAR TWO LEVELS;  Discectomy right Lumbar 4-Sacral 1, Hemilaminectomy bilateral Lumbar 4-Sacral 1;  Surgeon: Shree Martinez MD;  Location:  OR    LAMINECT/DISCECTOMY, LUMBAR  2010    L4-5    LAPAROSCOPIC CHOLECYSTECTOMY N/A 2023    Procedure: CHOLECYSTECTOMY, LAPAROSCOPIC;  Surgeon: Adryan Gaitan MD;  Location:  OR    OPERATIVE HYSTEROSCOPY WITH MORCELLATOR N/A 2019    Procedure: Diagnostic hysteroscopy, myosure myomectomy;  Surgeon: Viral Hodges MD;  Location:  OR    ORTHOPEDIC SURGERY      Rt Knee    TUBAL LIGATION        No Known Allergies   Social History     Tobacco Use    Smoking status: Former     Current packs/day: 0.00     Types: Cigarettes     Quit date: 2003     Years since quittin.0     Passive exposure: Never    Smokeless tobacco: Never   Substance Use Topics    Alcohol use: Not Currently     Comment: Very Rare      Wt Readings from Last 1  Encounters:   06/23/25 99.9 kg (220 lb 3.2 oz)        Anesthesia Evaluation   Pt has had prior anesthetic. Type: General and MAC.        ROS/MED HX  ENT/Pulmonary:  - neg pulmonary ROS     Neurologic:  - neg neurologic ROS     Cardiovascular:     (+)  - -   -  - -                                 Previous cardiac testing   Echo: Date: Results:    Stress Test:  Date: Results:    ECG Reviewed:  Date: 12/30/2022 Results:  SR  Cath:  Date: Results:      METS/Exercise Tolerance:     Hematologic:  - neg hematologic  ROS     Musculoskeletal: Comment: Herniated nucleus pulposus, L5-S1, right    Lumbar radiculopathy    Tendinitis of left wrist    Right shoulder pain      GI/Hepatic: Comment: Constipation, unspecified constipation type    (+) GERD, Asymptomatic on medication,                  Renal/Genitourinary:  - neg Renal ROS     Endo:     (+)               Obesity,       Psychiatric/Substance Use:     (+) psychiatric history depression       Infectious Disease:  - neg infectious disease ROS     Malignancy:  - neg malignancy ROS     Other:  - neg other ROS            Physical Exam  Airway  Mallampati: II  TM distance: >3 FB  Neck ROM: full  Upper bite lip test: I  Mouth opening: >= 4 cm    Cardiovascular   Rhythm: regular  Rate: normal rate     Dental   (+) Completely normal teeth      Pulmonary - normal examBreath sounds clear to auscultation        Neurological - normal exam  She appears awake, alert and oriented x3.    Other Findings       OUTSIDE LABS:  CBC:   Lab Results   Component Value Date    WBC 9.6 07/16/2024    WBC 10.2 01/17/2023    HGB 13.4 07/16/2024    HGB 12.2 01/17/2023    HCT 39.8 07/16/2024    HCT 38.4 01/17/2023     07/16/2024     01/17/2023     BMP:   Lab Results   Component Value Date     06/23/2025     07/16/2024    POTASSIUM 3.9 06/23/2025    POTASSIUM 4.1 07/16/2024    CHLORIDE 104 06/23/2025    CHLORIDE 104 07/16/2024    CO2 27 06/23/2025    CO2 26 07/16/2024    BUN 13.2  06/23/2025    BUN 11.8 07/16/2024    CR 1.13 (H) 06/23/2025    CR 0.93 07/16/2024    GLC 97 06/23/2025    GLC 94 07/16/2024     COAGS:   Lab Results   Component Value Date    INR 0.96 01/21/2010     POC:   Lab Results   Component Value Date    HCG Negative 08/08/2012     HEPATIC:   Lab Results   Component Value Date    ALBUMIN 3.9 06/23/2025    PROTTOTAL 7.0 06/23/2025    ALT 16 06/23/2025    AST 16 06/23/2025    ALKPHOS 50 06/23/2025    BILITOTAL 0.2 06/23/2025     OTHER:   Lab Results   Component Value Date    KAMARI 9.2 06/23/2025    PHOS 3.1 01/21/2010    MAG 2.1 01/21/2010    LIPASE 60 06/23/2025    TSH 2.58 07/16/2024    SED 47 (H) 01/19/2010       Anesthesia Plan    ASA Status:  3      NPO Status: NPO Appropriate   Anesthesia Type: MAC.  Airway: oral, natural airway.  Induction: intravenous.  Maintenance: TIVA.   Techniques and Equipment:       - Monitoring Plan: standard ASA monitoring     Consents    Anesthesia Plan(s) and associated risks, benefits, and realistic alternatives discussed. Questions answered and patient/representative(s) expressed understanding.     - Discussed: CRNA     - Discussed with:  Patient        - Pt is DNR/DNI Status: no DNR     Blood Consent:      - Discussed with: not discussed.     Postoperative Care         Comments:    Other Comments: MAC with IV Propofol sedation with standard ASA monitoring discussed with the pt.  He consents to plan and wishes to proceed accepting of risks.                 BINU Montano CRNA    I have reviewed the pertinent notes and labs in the chart from the past 30 days and (re)examined the patient.  Any updates or changes from those notes are reflected in this note.    Clinically Significant Risk Factors Present on Admission

## 2025-07-09 ENCOUNTER — ANESTHESIA (OUTPATIENT)
Dept: GASTROENTEROLOGY | Facility: CLINIC | Age: 55
End: 2025-07-09
Payer: COMMERCIAL

## 2025-07-09 ENCOUNTER — HOSPITAL ENCOUNTER (OUTPATIENT)
Facility: CLINIC | Age: 55
Discharge: HOME | End: 2025-07-09
Attending: INTERNAL MEDICINE | Admitting: INTERNAL MEDICINE
Payer: COMMERCIAL

## 2025-07-09 VITALS
OXYGEN SATURATION: 100 % | RESPIRATION RATE: 16 BRPM | SYSTOLIC BLOOD PRESSURE: 124 MMHG | DIASTOLIC BLOOD PRESSURE: 82 MMHG | TEMPERATURE: 97.5 F | HEART RATE: 65 BPM

## 2025-07-09 LAB — UPPER GI ENDOSCOPY: NORMAL

## 2025-07-09 PROCEDURE — 250N000011 HC RX IP 250 OP 636: Performed by: NURSE ANESTHETIST, CERTIFIED REGISTERED

## 2025-07-09 PROCEDURE — 250N000009 HC RX 250: Performed by: NURSE ANESTHETIST, CERTIFIED REGISTERED

## 2025-07-09 PROCEDURE — 43239 EGD BIOPSY SINGLE/MULTIPLE: CPT | Performed by: INTERNAL MEDICINE

## 2025-07-09 PROCEDURE — 258N000003 HC RX IP 258 OP 636: Performed by: NURSE ANESTHETIST, CERTIFIED REGISTERED

## 2025-07-09 PROCEDURE — 88313 SPECIAL STAINS GROUP 2: CPT | Mod: 26 | Performed by: PATHOLOGY

## 2025-07-09 PROCEDURE — 370N000017 HC ANESTHESIA TECHNICAL FEE, PER MIN: Performed by: INTERNAL MEDICINE

## 2025-07-09 PROCEDURE — 88313 SPECIAL STAINS GROUP 2: CPT | Mod: TC | Performed by: INTERNAL MEDICINE

## 2025-07-09 PROCEDURE — 88305 TISSUE EXAM BY PATHOLOGIST: CPT | Mod: 26 | Performed by: PATHOLOGY

## 2025-07-09 RX ORDER — PROPOFOL 10 MG/ML
INJECTION, EMULSION INTRAVENOUS PRN
Status: DISCONTINUED | OUTPATIENT
Start: 2025-07-09 | End: 2025-07-09

## 2025-07-09 RX ORDER — SODIUM CHLORIDE, SODIUM LACTATE, POTASSIUM CHLORIDE, CALCIUM CHLORIDE 600; 310; 30; 20 MG/100ML; MG/100ML; MG/100ML; MG/100ML
INJECTION, SOLUTION INTRAVENOUS CONTINUOUS
Status: DISCONTINUED | OUTPATIENT
Start: 2025-07-09 | End: 2025-07-09 | Stop reason: HOSPADM

## 2025-07-09 RX ORDER — DEXAMETHASONE SODIUM PHOSPHATE 10 MG/ML
4 INJECTION, SOLUTION INTRAMUSCULAR; INTRAVENOUS
Status: DISCONTINUED | OUTPATIENT
Start: 2025-07-09 | End: 2025-07-09 | Stop reason: HOSPADM

## 2025-07-09 RX ORDER — ONDANSETRON 4 MG/1
4 TABLET, ORALLY DISINTEGRATING ORAL EVERY 30 MIN PRN
Status: DISCONTINUED | OUTPATIENT
Start: 2025-07-09 | End: 2025-07-09 | Stop reason: HOSPADM

## 2025-07-09 RX ORDER — NALOXONE HYDROCHLORIDE 0.4 MG/ML
0.1 INJECTION, SOLUTION INTRAMUSCULAR; INTRAVENOUS; SUBCUTANEOUS
Status: DISCONTINUED | OUTPATIENT
Start: 2025-07-09 | End: 2025-07-09 | Stop reason: HOSPADM

## 2025-07-09 RX ORDER — ONDANSETRON 2 MG/ML
4 INJECTION INTRAMUSCULAR; INTRAVENOUS EVERY 30 MIN PRN
Status: DISCONTINUED | OUTPATIENT
Start: 2025-07-09 | End: 2025-07-09 | Stop reason: HOSPADM

## 2025-07-09 RX ORDER — FENTANYL CITRATE 50 UG/ML
25 INJECTION, SOLUTION INTRAMUSCULAR; INTRAVENOUS
Status: DISCONTINUED | OUTPATIENT
Start: 2025-07-09 | End: 2025-07-09 | Stop reason: HOSPADM

## 2025-07-09 RX ORDER — LIDOCAINE HYDROCHLORIDE 20 MG/ML
INJECTION, SOLUTION INFILTRATION; PERINEURAL PRN
Status: DISCONTINUED | OUTPATIENT
Start: 2025-07-09 | End: 2025-07-09

## 2025-07-09 RX ADMIN — PROPOFOL 300 MCG/KG/MIN: 10 INJECTION, EMULSION INTRAVENOUS at 09:00

## 2025-07-09 RX ADMIN — LIDOCAINE HYDROCHLORIDE 50 MG: 20 INJECTION, SOLUTION INFILTRATION; PERINEURAL at 08:59

## 2025-07-09 RX ADMIN — SODIUM CHLORIDE, SODIUM LACTATE, POTASSIUM CHLORIDE, AND CALCIUM CHLORIDE: .6; .31; .03; .02 INJECTION, SOLUTION INTRAVENOUS at 08:32

## 2025-07-09 RX ADMIN — PROPOFOL 200 MG: 10 INJECTION, EMULSION INTRAVENOUS at 08:59

## 2025-07-09 ASSESSMENT — ACTIVITIES OF DAILY LIVING (ADL)
ADLS_ACUITY_SCORE: 41
ADLS_ACUITY_SCORE: 41

## 2025-07-09 NOTE — ANESTHESIA CARE TRANSFER NOTE
Patient: Triny Bower    Procedure: Procedure(s):  ESOPHAGOGASTRODUODENOSCOPY, WITH BIOPSY       Diagnosis: RUQ abdominal pain [R10.11]  Chronic right-sided thoracic back pain [M54.6, G89.29]  Epigastric pain [R10.13]  Gastroesophageal reflux disease without esophagitis [K21.9]  Diagnosis Additional Information: No value filed.    Anesthesia Type:   MAC     Note:    Oropharynx: oropharynx clear of all foreign objects and spontaneously breathing  Level of Consciousness: drowsy  Oxygen Supplementation: room air    Independent Airway: airway patency satisfactory and stable  Dentition: dentition unchanged  Vital Signs Stable: post-procedure vital signs reviewed and stable  Report to RN Given: handoff report given  Patient transferred to: Phase II    Handoff Report: Identifed the Patient, Identified the Reponsible Provider, Reviewed the pertinent medical history, Discussed the surgical course, Reviewed Intra-OP anesthesia mangement and issues during anesthesia, Set expectations for post-procedure period and Allowed opportunity for questions and acknowledgement of understanding      Vitals:  Vitals Value Taken Time   BP 94/56 07/09/25 09:15   Temp     Pulse 72 07/09/25 09:15   Resp 16 07/09/25 09:10   SpO2 99 % 07/09/25 09:23   Vitals shown include unfiled device data.    Electronically Signed By: BINU Montano CRNA  July 9, 2025  9:24 AM

## 2025-07-09 NOTE — ANESTHESIA POSTPROCEDURE EVALUATION
Patient: Triny Bower    Procedure: Procedure(s):  ESOPHAGOGASTRODUODENOSCOPY, WITH BIOPSY       Anesthesia Type:  MAC    Note:  Disposition: Outpatient   Postop Pain Control: Uneventful            Sign Out: Well controlled pain   PONV: No   Neuro/Psych: Uneventful            Sign Out: Acceptable/Baseline neuro status   Airway/Respiratory: Uneventful            Sign Out: Acceptable/Baseline resp. status   CV/Hemodynamics: Uneventful            Sign Out: Acceptable CV status   Other NRE: NONE   DID A NON-ROUTINE EVENT OCCUR? No    Event details/Postop Comments:  Pt was happy with anesthesia care.  No complications.  I will follow up with the pt if needed.           Last vitals:  Vitals Value Taken Time   BP 94/56 07/09/25 09:15   Temp     Pulse 72 07/09/25 09:15   Resp 16 07/09/25 09:10   SpO2 98 % 07/09/25 09:24   Vitals shown include unfiled device data.    Electronically Signed By: BINU Montano CRNA  July 9, 2025  9:25 AM

## 2025-07-09 NOTE — DISCHARGE INSTRUCTIONS
[facility]    Home Care Following Endoscopy          Activity:  You have just undergone an endoscopic procedure usually performed with conscious sedation.  Do not work or operate machinery (including a car) for at least 12 hours.    I encourage you to walk and attempt to pass this air as soon as possible.    Diet:  Return to the diet you were on before your procedure but eat lightly for the first 12-24 hours.  Drink plenty of water.  Resume any regular medications unless otherwise advised by your physician.  Please begin any new medication prescribed as a result of your procedure as directed by your physician.   If you had any biopsy or polyp removed please refrain from aspirin or aspirin products for 2 days.  If on Coumadin please restart as instructed by your physician.   Pain:  You may take Tylenol as needed for pain.  Expected Recovery:  You can expect some mild abdominal fullness and/or discomfort due to the air used to inflate your intestinal tract. It is also normal to have a mild sore throat after upper endoscopy.    Call Your Physician if You Have:  After Upper Endoscopy:  Shoulder, back or chest pain.  Difficulty breathing or swallowing.  Vomiting blood.    Any questions or concerns about your recovery, please call [phone] or after hours [phone] ([phone]) Nurse Advice Line.    Follow-up Care:  You did have biopsy tissue sample(s) removed.  The biopsy tissue sample(s) will be sent to pathology.    You should receive letter in your My Chart with your results within 1-2 weeks. If you do not participate in My Chart a physical letter will come in the mail in 2-3 weeks.  Please call if you have not received a notification of your results.  If asked to return to clinic please make an appointment 1 week after your procedure.  Call [phone].

## 2025-07-21 ENCOUNTER — LAB (OUTPATIENT)
Dept: LAB | Facility: CLINIC | Age: 55
End: 2025-07-21
Payer: COMMERCIAL

## 2025-07-21 DIAGNOSIS — R79.89 ELEVATED SERUM CREATININE: ICD-10-CM

## 2025-07-21 LAB
CREAT SERPL-MCNC: 0.96 MG/DL (ref 0.51–0.95)
EGFRCR SERPLBLD CKD-EPI 2021: 70 ML/MIN/1.73M2

## 2025-07-21 PROCEDURE — 36415 COLL VENOUS BLD VENIPUNCTURE: CPT

## 2025-07-21 PROCEDURE — 82565 ASSAY OF CREATININE: CPT

## 2025-08-24 ENCOUNTER — HEALTH MAINTENANCE LETTER (OUTPATIENT)
Age: 55
End: 2025-08-24

## (undated) DEVICE — SYR 10ML FINGER CONTROL W/O NDL 309695

## (undated) DEVICE — GLOVE PROTEXIS BLUE W/NEU-THERA 8.0  2D73EB80

## (undated) DEVICE — BASIN SET MINOR DISP

## (undated) DEVICE — KIT ENDO TURNOVER/PROCEDURE CARRY-ON 101822

## (undated) DEVICE — LABEL MEDICATION SYSTEM  3304

## (undated) DEVICE — ENDO FORCEP ENDOJAW BIOPSY 2.8MMX230CM FB-220U

## (undated) DEVICE — GLOVE ESTEEM BLUE W/NEU-THERA 8.0  2D73PB80

## (undated) DEVICE — PREP POVIDONE IODINE SCRUB 7.5% 120ML

## (undated) DEVICE — CATH SELF FEMALE 14FR 6"

## (undated) DEVICE — ENDO CLIP CARTIRDGE K2 MED/LG 10 1112

## (undated) DEVICE — ESU ENDO SCISSORS 5MM CVD 5DCS

## (undated) DEVICE — PEN MARKING SKIN

## (undated) DEVICE — LUBRICATING JELLY 4.25OZ

## (undated) DEVICE — SU VICRYL 3-0 SH 27" UND J416H

## (undated) DEVICE — PREP POVIDONE IODINE SOLUTION 10% 120ML

## (undated) DEVICE — SUCTION MANIFOLD NEPTUNE 2 SYS 4 PORT 0702-020-000

## (undated) DEVICE — PACK GENERAL LAPAOSCOPY

## (undated) DEVICE — DEVICE SUTURE GRASPER TROCAR CLOSURE 14GA PMITCSG

## (undated) DEVICE — ENDO TROCAR FIRST ENTRY KII FIOS Z-THRD 11X100MM CTF33

## (undated) DEVICE — PACK BASIC SET-UP 9101

## (undated) DEVICE — GLOVE PROTEXIS W/NEU-THERA 8.0  2D73TE80

## (undated) DEVICE — TUBING SUCTION 12"X1/4" N612

## (undated) DEVICE — PAD PERI INDIV WRAP 11" 2022A

## (undated) DEVICE — SOL NACL 0.9% INJ 1000ML BAG 07983-09

## (undated) DEVICE — PREP SKIN SCRUB TRAY 4461A

## (undated) DEVICE — NDL SPINAL 25GA 3.5" QUINCKE 405180

## (undated) DEVICE — ESU GROUND PAD UNIVERSAL W/O CORD

## (undated) DEVICE — DRAPE GYN/UROLOGY FLUID POUCH TUR 29455

## (undated) DEVICE — ENDO POUCH UNIV RETRIEVAL SYSTEM INZII 10MM CD001

## (undated) DEVICE — SUCTION CURETTE 3MM ENDOMETRIAL MX140

## (undated) DEVICE — GAS CARTRIDGE ABLATION CARBON DIOXIDE NOVASURE

## (undated) DEVICE — DRSG TELFA 3X8" 1238

## (undated) DEVICE — SOL NACL 0.9% IRRIG 1000ML BOTTLE 2F7124

## (undated) DEVICE — GOWN XLG DISP 9545

## (undated) DEVICE — SPONGE RAY-TEC 4X4" 7317

## (undated) DEVICE — ENDO TROCAR SLEEVE KII Z-THREADED 05X100MM CTS02

## (undated) DEVICE — ESU SUCTION/IRRIGATION SYSTEM PISTOL GRIP

## (undated) DEVICE — GOWN IMPERVIOUS BREATHABLE 2XL/XLONG

## (undated) DEVICE — SOL NACL 0.9% IRRIG 3000ML BAG 07972-08

## (undated) DEVICE — ADH SKIN CLOSURE PREMIERPRO EXOFIN 1.0ML 3470

## (undated) DEVICE — ESU HOOK TIP 5MM CONMED

## (undated) DEVICE — SU PDS II 1 CT-1 MONOFIL Z347H

## (undated) DEVICE — PACK SET-UP STD 9102

## (undated) DEVICE — ESU CATH ABLATION NOVASURE NS2000

## (undated) DEVICE — SU MONOCRYL 4-0 PS-2 18" UND Y496G

## (undated) DEVICE — ENDO ESU MORCELLATOR MYOSURE HANDPIECE MX0100

## (undated) DEVICE — ENDO TROCAR FIRST ENTRY KII FIOS Z-THRD 05X100MM CTF03

## (undated) DEVICE — GLOVE PROTEXIS W/NEU-THERA 7.5  2D73TE75

## (undated) DEVICE — NDL SPINAL WHITACRE 22GA 3.5" 405010

## (undated) DEVICE — ESU CORD MONOPOLAR HIGH FREQUENCY 26006M-D/10

## (undated) DEVICE — TUBING CYSTO/BLADDER IRRIG SET 80" 06544-01

## (undated) DEVICE — BRUSH CYTOLOGY SOFT 8"

## (undated) RX ORDER — FENTANYL CITRATE 50 UG/ML
INJECTION, SOLUTION INTRAMUSCULAR; INTRAVENOUS
Status: DISPENSED
Start: 2019-07-31

## (undated) RX ORDER — LIDOCAINE HYDROCHLORIDE 20 MG/ML
INJECTION, SOLUTION EPIDURAL; INFILTRATION; INTRACAUDAL; PERINEURAL
Status: DISPENSED
Start: 2019-07-31

## (undated) RX ORDER — FENTANYL CITRATE 50 UG/ML
INJECTION, SOLUTION INTRAMUSCULAR; INTRAVENOUS
Status: DISPENSED
Start: 2023-01-06

## (undated) RX ORDER — BUPIVACAINE HYDROCHLORIDE AND EPINEPHRINE 2.5; 5 MG/ML; UG/ML
INJECTION, SOLUTION EPIDURAL; INFILTRATION; INTRACAUDAL; PERINEURAL
Status: DISPENSED
Start: 2019-07-31

## (undated) RX ORDER — KETOROLAC TROMETHAMINE 30 MG/ML
INJECTION, SOLUTION INTRAMUSCULAR; INTRAVENOUS
Status: DISPENSED
Start: 2023-01-06

## (undated) RX ORDER — ONDANSETRON 2 MG/ML
INJECTION INTRAMUSCULAR; INTRAVENOUS
Status: DISPENSED
Start: 2019-07-31

## (undated) RX ORDER — DIMENHYDRINATE 50 MG/ML
INJECTION, SOLUTION INTRAMUSCULAR; INTRAVENOUS
Status: DISPENSED
Start: 2023-01-06

## (undated) RX ORDER — LIDOCAINE HYDROCHLORIDE 10 MG/ML
INJECTION, SOLUTION EPIDURAL; INFILTRATION; INTRACAUDAL; PERINEURAL
Status: DISPENSED
Start: 2019-07-31

## (undated) RX ORDER — DEXAMETHASONE SODIUM PHOSPHATE 10 MG/ML
INJECTION, SOLUTION INTRAMUSCULAR; INTRAVENOUS
Status: DISPENSED
Start: 2023-01-06

## (undated) RX ORDER — ONDANSETRON 2 MG/ML
INJECTION INTRAMUSCULAR; INTRAVENOUS
Status: DISPENSED
Start: 2023-01-06

## (undated) RX ORDER — BUPIVACAINE HYDROCHLORIDE AND EPINEPHRINE 2.5; 5 MG/ML; UG/ML
INJECTION, SOLUTION EPIDURAL; INFILTRATION; INTRACAUDAL; PERINEURAL
Status: DISPENSED
Start: 2023-01-06

## (undated) RX ORDER — PROPOFOL 10 MG/ML
INJECTION, EMULSION INTRAVENOUS
Status: DISPENSED
Start: 2019-07-31

## (undated) RX ORDER — HYDROMORPHONE HYDROCHLORIDE 1 MG/ML
INJECTION, SOLUTION INTRAMUSCULAR; INTRAVENOUS; SUBCUTANEOUS
Status: DISPENSED
Start: 2023-01-06

## (undated) RX ORDER — DEXAMETHASONE SODIUM PHOSPHATE 10 MG/ML
INJECTION, SOLUTION INTRAMUSCULAR; INTRAVENOUS
Status: DISPENSED
Start: 2019-07-31